# Patient Record
Sex: FEMALE | Race: BLACK OR AFRICAN AMERICAN | NOT HISPANIC OR LATINO | Employment: FULL TIME | URBAN - METROPOLITAN AREA
[De-identification: names, ages, dates, MRNs, and addresses within clinical notes are randomized per-mention and may not be internally consistent; named-entity substitution may affect disease eponyms.]

---

## 2017-11-13 ENCOUNTER — OFFICE VISIT (OUTPATIENT)
Dept: ONCOLOGY | Facility: CLINIC | Age: 57
End: 2017-11-13

## 2017-11-13 ENCOUNTER — APPOINTMENT (OUTPATIENT)
Dept: LAB | Facility: HOSPITAL | Age: 57
End: 2017-11-13

## 2017-11-13 ENCOUNTER — LAB (OUTPATIENT)
Dept: LAB | Facility: HOSPITAL | Age: 57
End: 2017-11-13

## 2017-11-13 ENCOUNTER — APPOINTMENT (OUTPATIENT)
Dept: ONCOLOGY | Facility: CLINIC | Age: 57
End: 2017-11-13

## 2017-11-13 VITALS
HEART RATE: 86 BPM | HEIGHT: 66 IN | BODY MASS INDEX: 40.08 KG/M2 | DIASTOLIC BLOOD PRESSURE: 82 MMHG | RESPIRATION RATE: 16 BRPM | WEIGHT: 249.4 LBS | TEMPERATURE: 97.9 F | SYSTOLIC BLOOD PRESSURE: 130 MMHG

## 2017-11-13 DIAGNOSIS — D70.9 NEUTROPENIA, UNSPECIFIED TYPE (HCC): ICD-10-CM

## 2017-11-13 DIAGNOSIS — D72.820 LYMPHOCYTOSIS: ICD-10-CM

## 2017-11-13 DIAGNOSIS — D72.820 LYMPHOCYTOSIS: Primary | ICD-10-CM

## 2017-11-13 LAB
BASOPHILS # BLD AUTO: 0 10*3/MM3 (ref 0–0.1)
BASOPHILS NFR BLD AUTO: 0 % (ref 0–1.1)
DEPRECATED RDW RBC AUTO: 38.5 FL (ref 37–49)
EOSINOPHIL # BLD AUTO: 0.01 10*3/MM3 (ref 0–0.36)
EOSINOPHIL NFR BLD AUTO: 0.2 % (ref 1–5)
ERYTHROCYTE [DISTWIDTH] IN BLOOD BY AUTOMATED COUNT: 12.7 % (ref 11.7–14.5)
HCT VFR BLD AUTO: 43.2 % (ref 34–45)
HGB BLD-MCNC: 14.7 G/DL (ref 11.5–14.9)
IMM GRANULOCYTES # BLD: 0.01 10*3/MM3 (ref 0–0.03)
IMM GRANULOCYTES NFR BLD: 0.2 % (ref 0–0.5)
LYMPHOCYTES # BLD AUTO: 2.13 10*3/MM3 (ref 1–3.5)
LYMPHOCYTES NFR BLD AUTO: 50.8 % (ref 20–49)
MCH RBC QN AUTO: 28.4 PG (ref 27–33)
MCHC RBC AUTO-ENTMCNC: 34 G/DL (ref 32–35)
MCV RBC AUTO: 83.4 FL (ref 83–97)
MONOCYTES # BLD AUTO: 0.43 10*3/MM3 (ref 0.25–0.8)
MONOCYTES NFR BLD AUTO: 10.3 % (ref 4–12)
NEUTROPHILS # BLD AUTO: 1.61 10*3/MM3 (ref 1.5–7)
NEUTROPHILS NFR BLD AUTO: 38.5 % (ref 39–75)
NRBC BLD MANUAL-RTO: 0 /100 WBC (ref 0–0)
PLATELET # BLD AUTO: 154 10*3/MM3 (ref 150–375)
PMV BLD AUTO: 14 FL (ref 8.9–12.1)
RBC # BLD AUTO: 5.18 10*6/MM3 (ref 3.9–5)
WBC NRBC COR # BLD: 4.19 10*3/MM3 (ref 4–10)

## 2017-11-13 PROCEDURE — 85025 COMPLETE CBC W/AUTO DIFF WBC: CPT | Performed by: INTERNAL MEDICINE

## 2017-11-13 PROCEDURE — 99213 OFFICE O/P EST LOW 20 MIN: CPT | Performed by: INTERNAL MEDICINE

## 2017-11-13 PROCEDURE — 36416 COLLJ CAPILLARY BLOOD SPEC: CPT | Performed by: INTERNAL MEDICINE

## 2017-11-13 RX ORDER — CLONIDINE HYDROCHLORIDE 0.3 MG/1
0.3 TABLET ORAL DAILY
COMMUNITY
Start: 2017-10-04 | End: 2021-08-23 | Stop reason: SDUPTHER

## 2017-11-13 NOTE — PROGRESS NOTES
Subjective . No SYMPTOMS    REASONS FOR FOLLOW UP:  LEUKOPENIA AND LYMPHOCYTOSIS.MARGINAL LOW B12 LEVEL.    HISTORY OF PRESENT ILLNESS:  The patient is a 57 y.o. year old female  who is here for follow-up with the above-mentioned history.   The patient is here worried today about what kind of hematological condition she will have.  the patient is asymptomatic.  Specifically, she has no fevers, no chills, no sweats, no pruritus or peripheral adenopathy; no rashes in the skin; no symptoms that would suggest any collagen vascular disease or symptoms that could suggest any [place]’s disease.  The patient’s appetite is stable.  Bowel function is normal.  Urination is normal.  No cardiorespiratory symptomatology. .  Otherwise, she remains fully functional, having no limitations in her activities of daily living.                    Past Medical History:   Diagnosis Date   • Benign heart murmur    • Hypertension    • Hypothyroidism    • Leukopenia    • Sickle cell anemia without crisis     SICKLE CELL TRAIT ONLY     Past Surgical History:   Procedure Laterality Date   • BILATERAL BREAST REDUCTION Bilateral 2013   • HYSTERECTOMY  1997           MEDICATIONS    Current Outpatient Prescriptions:   •  amLODIPine (NORVASC) 10 MG tablet, , Disp: , Rfl:   •  aspirin 81 MG EC tablet, Take 81 mg by mouth daily., Disp: , Rfl:   •  CloNIDine (CATAPRES) 0.3 MG tablet, , Disp: , Rfl:   •  Cyanocobalamin (B-12 PO), Take  by mouth., Disp: , Rfl:   •  diphenhydrAMINE (BENADRYL) 25 mg capsule, Take 25 mg by mouth daily., Disp: , Rfl:   •  fosinopril (MONOPRIL) 40 MG tablet, , Disp: , Rfl:   •  hydrochlorothiazide (HYDRODIURIL) 50 MG tablet, , Disp: , Rfl:   •  levothyroxine (SYNTHROID, LEVOTHROID) 150 MCG tablet, , Disp: , Rfl:   •  venlafaxine (EFFEXOR) 75 MG tablet, , Disp: , Rfl:     ALLERGIES:     Allergies   Allergen Reactions   • Penicillins        SOCIAL HISTORY:       Social History     Social History   • Marital status:      " Spouse name: N/A   • Number of children: 0   • Years of education: COLLEGE     Occupational History   •     • Service Rep      [employer]     Social History Main Topics   • Smoking status: Former Smoker     Packs/day: 0.30     Types: Cigarettes     Quit date: 4/13/2016   • Smokeless tobacco: Not on file      Comment: Quit 4/23/16   • Alcohol use No   • Drug use: No   • Sexual activity: Yes     Partners: Male     Other Topics Concern   • Not on file     Social History Narrative    Has 1 adopted child.         FAMILY HISTORY:  Family History   Problem Relation Age of Onset   • Hypertension Mother    • Diabetes Mother    • Breast cancer Mother 65   • COPD Mother    • Arthritis Mother    • Heart disease Mother    • Alcohol abuse Father    • Cirrhosis Father    • Hypertension Sister    • Fibromyalgia Sister    • Diabetes Sister    • Hypertension Brother    • Heart disease Brother    • Diabetes Brother    • Sarcoidosis Sister        REVIEW OF SYSTEMS:  GENERAL: No change in appetite or weight;   No fevers, chills, sweats.    SKIN: No nonhealing lesions.   No rashes.  HEME/LYMPH: No easy bruising, bleeding.   No swollen nodes.   EYES: No vision changes or diplopia.   ENT: No tinnitus, hearing loss, gum bleeding, epistaxis, hoarseness or dysphagia.   RESPIRATORY: No cough, shortness of breath, hemoptysis or wheezing.   CVS: No chest pain, palpitations, orthopnea, dyspnea on exertion or PND.   GI: No melena or hematochezia.   No abdominal pain.  No nausea, vomiting, constipation, diarrhea  : No lower tract obstructive symptoms, dysuria or hematuria.   MUSCULOSKELETAL: No bone pain.  No joint stiffness.   NEUROLOGICAL: No global weakness, loss of consciousness or seizures.   PSYCHIATRIC: No increased nervousness, mood changes or depression.     Objective    Vitals:    11/13/17 1539   BP: 130/82   Pulse: 86   Resp: 16   Temp: 97.9 °F (36.6 °C)   Weight: 249 lb 6.4 oz (113 kg)   Height: 65.75\" (167 cm)  Comment: " new ht.   PainSc: 0-No pain     Current Status 11/13/2017   ECOG score 0      PHYSICAL EXAM:    GENERAL:  Well-developed, well-nourished in no acute distress.   SKIN:  Warm, dry without rashes, purpura or petechiae.  HEAD:  Normocephalic.  EYES:  Pupils equal, round and reactive to light.  EOMs intact.  Conjunctivae normal.  EARS:  Hearing intact.  NOSE:  Septum midline.  No excoriations or nasal discharge.  MOUTH:  Tongue is well-papillated; no stomatitis or ulcers.  Lips normal.  THROAT:  Oropharynx without lesions or exudates.  NECK:  Supple with good range of motion; no thyromegaly or masses, no JVD.  LYMPHATICS:  No cervical, supraclavicular, axillary or inguinal adenopathy.  CHEST:  Lungs clear to percussion and auscultation. Good airflow.  CARDIAC:  Regular rate and rhythm with SYSTOLIC G2/6 BASAL murmur,clean diastole, no rubs or gallops. Normal S1,S2.  ABDOMEN:  Soft, nontender with no organomegaly or masses.  EXTREMITIES:  No clubbing, cyanosis or edema.  NEUROLOGICAL:  Cranial Nerves II-XII grossly intact.  No focal neurological deficits.  PSYCHIATRIC:  Normal affect and mood.      RECENT LABS:  Component      Latest Ref Rng & Units 7/11/2016 11/14/2016 11/13/2017           1:16 PM 11:31 AM  3:29 PM   RBC      3.90 - 5.00 10*6/mm3 5.15 (H) 5.03 (H) 5.18 (H)   Hemoglobin      11.5 - 14.9 g/dL 14.3 13.7 14.7   Hematocrit      34.0 - 45.0 % 42.0 43.2 43.2   MCV      83.0 - 97.0 fL 81.6 (L) 85.9 83.4   MCH      27.0 - 33.0 pg 27.8 27.2 28.4   MCHC      32.0 - 35.0 g/dL 34.0 31.7 (L) 34.0   RDW      11.7 - 14.5 % 12.2 12.5 12.7   RDW-SD      37.0 - 49.0 fl 36.0 (L) 39.0 38.5   MPV      8.9 - 12.1 fL 13.6 (H) 13.5 (H) 14.0 (H)   Platelets      150 - 375 10*3/mm3 157 160 154   Neutrophil %      39.0 - 75.0 % 36.9 (L) 35.6 (L) 38.5 (L)   Lymphocyte %      20.0 - 49.0 % 52.5 (H) 54.3 (H) 50.8 (H)   Monocyte %      4.0 - 12.0 % 10.0 9.7 10.3   Eosinophil %      1.0 - 5.0 % 0.2 (L) 0.2 (L) 0.2 (L)   Basophil %       0.0 - 1.1 % 0.2 0.0 0.0           WBC   Date/Time Value Ref Range Status   11/13/2017 03:29 PM 4.19 4.00 - 10.00 10*3/mm3 Final     Hemoglobin   Date/Time Value Ref Range Status   11/13/2017 03:29 PM 14.7 11.5 - 14.9 g/dL Final     Platelets   Date/Time Value Ref Range Status   11/13/2017 03:29  150 - 375 10*3/mm3 Final       Assessment/Plan    This patient has minimal leukopenia with lymphocytosis, otherwise asymptomatic, and is very likely that this is representation of leukopenia of .  We have tested this patient during the original consultation for hemoglobinopathy, having a normal hemoglobin electrophoresis.  Also the patient had a normal ferritin, normal iron profile, normal folic acid level and a marginally low B12 level.  The patient had serum protein electrophoresis and immunofixation that were negative for monoclonal proteins.  Also the patient had peripheral blood flow cytometry that failed to document any alteration of monoclonality in regard to her lymphocyte population.  Given these facts, we advised the patient with a normal physical examination TO RETURN TO SEE US IN ONE YEAR WITH CBC.

## 2018-09-12 ENCOUNTER — OFFICE VISIT CONVERTED (OUTPATIENT)
Dept: FAMILY MEDICINE CLINIC | Facility: CLINIC | Age: 58
End: 2018-09-12
Attending: NURSE PRACTITIONER

## 2018-11-13 ENCOUNTER — CONVERSION ENCOUNTER (OUTPATIENT)
Dept: GENERAL RADIOLOGY | Facility: HOSPITAL | Age: 58
End: 2018-11-13

## 2018-12-19 ENCOUNTER — APPOINTMENT (OUTPATIENT)
Dept: LAB | Facility: HOSPITAL | Age: 58
End: 2018-12-19

## 2018-12-19 ENCOUNTER — OFFICE VISIT (OUTPATIENT)
Dept: ONCOLOGY | Facility: CLINIC | Age: 58
End: 2018-12-19

## 2018-12-19 VITALS
DIASTOLIC BLOOD PRESSURE: 90 MMHG | SYSTOLIC BLOOD PRESSURE: 144 MMHG | BODY MASS INDEX: 34.6 KG/M2 | TEMPERATURE: 98.7 F | HEART RATE: 82 BPM | RESPIRATION RATE: 12 BRPM | OXYGEN SATURATION: 99 % | HEIGHT: 65 IN | WEIGHT: 207.7 LBS

## 2018-12-19 DIAGNOSIS — D69.6 THROMBOCYTOPENIA (HCC): Primary | ICD-10-CM

## 2018-12-19 DIAGNOSIS — D72.820 LYMPHOCYTOSIS: Primary | ICD-10-CM

## 2018-12-19 LAB
BASOPHILS # BLD AUTO: 0.01 10*3/MM3 (ref 0–0.1)
BASOPHILS NFR BLD AUTO: 0.2 % (ref 0–1.1)
DEPRECATED RDW RBC AUTO: 38.2 FL (ref 37–49)
EOSINOPHIL # BLD AUTO: 0.01 10*3/MM3 (ref 0–0.36)
EOSINOPHIL NFR BLD AUTO: 0.2 % (ref 1–5)
ERYTHROCYTE [DISTWIDTH] IN BLOOD BY AUTOMATED COUNT: 12.7 % (ref 11.7–14.5)
HCT VFR BLD AUTO: 42.2 % (ref 34–45)
HGB BLD-MCNC: 14.1 G/DL (ref 11.5–14.9)
IMM GRANULOCYTES # BLD: 0.02 10*3/MM3 (ref 0–0.03)
IMM GRANULOCYTES NFR BLD: 0.4 % (ref 0–0.5)
LYMPHOCYTES # BLD AUTO: 2.62 10*3/MM3 (ref 1–3.5)
LYMPHOCYTES NFR BLD AUTO: 57.6 % (ref 20–49)
MCH RBC QN AUTO: 27.8 PG (ref 27–33)
MCHC RBC AUTO-ENTMCNC: 33.4 G/DL (ref 32–35)
MCV RBC AUTO: 83.2 FL (ref 83–97)
MONOCYTES # BLD AUTO: 0.37 10*3/MM3 (ref 0.25–0.8)
MONOCYTES NFR BLD AUTO: 8.1 % (ref 4–12)
NEUTROPHILS # BLD AUTO: 1.52 10*3/MM3 (ref 1.5–7)
NEUTROPHILS NFR BLD AUTO: 33.5 % (ref 39–75)
NRBC BLD MANUAL-RTO: 0 /100 WBC (ref 0–0)
PLATELET # BLD AUTO: 127 10*3/MM3 (ref 150–375)
PMV BLD AUTO: 13.1 FL (ref 8.9–12.1)
RBC # BLD AUTO: 5.07 10*6/MM3 (ref 3.9–5)
WBC NRBC COR # BLD: 4.55 10*3/MM3 (ref 4–10)

## 2018-12-19 PROCEDURE — 36415 COLL VENOUS BLD VENIPUNCTURE: CPT | Performed by: INTERNAL MEDICINE

## 2018-12-19 PROCEDURE — 99213 OFFICE O/P EST LOW 20 MIN: CPT | Performed by: INTERNAL MEDICINE

## 2018-12-19 PROCEDURE — 85025 COMPLETE CBC W/AUTO DIFF WBC: CPT | Performed by: INTERNAL MEDICINE

## 2018-12-19 RX ORDER — TOPIRAMATE 50 MG/1
1 CAPSULE, EXTENDED RELEASE ORAL
Refills: 0 | COMMUNITY
Start: 2018-11-17 | End: 2020-01-16 | Stop reason: SDDI

## 2018-12-19 RX ORDER — PHENTERMINE HYDROCHLORIDE 37.5 MG/1
37.5 TABLET ORAL EVERY OTHER DAY
Refills: 0 | COMMUNITY
Start: 2018-11-17 | End: 2022-11-02 | Stop reason: SDUPTHER

## 2018-12-19 NOTE — PROGRESS NOTES
Subjective . No SYMPTOMS    REASONS FOR FOLLOW UP:  LEUKOPENIA AND LYMPHOCYTOSIS.MARGINAL LOW B12 LEVEL.mild thrombocytopenia    HISTORY OF PRESENT ILLNESS:  This patients returns today to the office for followup stating that she feels terrific. She has been walking close to 2 miles a day and she has been able to get rid of 50 pounds of weight. She modified her diet as well. This is a terrific achievement. She has had good bowel activity, every other day, normal for her. Normal urination. No clinical bleeding and no infections of any nature. She has been told she has an element of osteopenia.                 Past Medical History:   Diagnosis Date   • Benign heart murmur    • Hypertension    • Hypothyroidism    • Leukopenia    • Osteopenia    • Sickle cell anemia without crisis (CMS/HCC)     SICKLE CELL TRAIT ONLY     Past Surgical History:   Procedure Laterality Date   • BILATERAL BREAST REDUCTION Bilateral 2013   • HYSTERECTOMY  1997           MEDICATIONS    Current Outpatient Medications:   •  amLODIPine (NORVASC) 10 MG tablet, , Disp: , Rfl:   •  aspirin 81 MG EC tablet, Take 81 mg by mouth daily., Disp: , Rfl:   •  CALCIUM PO, Take  by mouth., Disp: , Rfl:   •  CloNIDine (CATAPRES) 0.3 MG tablet, , Disp: , Rfl:   •  Cyanocobalamin (B-12 PO), Take  by mouth., Disp: , Rfl:   •  diphenhydrAMINE (BENADRYL) 25 mg capsule, Take 25 mg by mouth daily., Disp: , Rfl:   •  fosinopril (MONOPRIL) 40 MG tablet, , Disp: , Rfl:   •  hydrochlorothiazide (HYDRODIURIL) 50 MG tablet, , Disp: , Rfl:   •  levothyroxine (SYNTHROID, LEVOTHROID) 150 MCG tablet, , Disp: , Rfl:   •  Multiple Vitamin (MULTI VITAMIN DAILY PO), Take  by mouth., Disp: , Rfl:   •  phentermine (ADIPEX-P) 37.5 MG tablet, Take 37.5 mg by mouth Every Morning., Disp: , Rfl: 0  •  TROKENDI XR 50 MG capsule sustained-release 24 hr, Take 1 capsule by mouth every night at bedtime., Disp: , Rfl: 0  •  venlafaxine (EFFEXOR) 75 MG tablet, , Disp: , Rfl:   •  CLONIDINE  HCL PO, , Disp: , Rfl:     ALLERGIES:     Allergies   Allergen Reactions   • Penicillins        SOCIAL HISTORY:       Social History     Socioeconomic History   • Marital status:      Spouse name: Not on file   • Number of children: 0   • Years of education: COLLEGE   • Highest education level: Not on file   Social Needs   • Financial resource strain: Not on file   • Food insecurity - worry: Not on file   • Food insecurity - inability: Not on file   • Transportation needs - medical: Not on file   • Transportation needs - non-medical: Not on file   Occupational History   • Occupation:    • Occupation: Service Rep     Comment: [employer]   Tobacco Use   • Smoking status: Former Smoker     Packs/day: 0.30     Types: Cigarettes     Last attempt to quit: 2016     Years since quittin.6   • Tobacco comment: Quit 16   Substance and Sexual Activity   • Alcohol use: No   • Drug use: No   • Sexual activity: Yes     Partners: Male   Other Topics Concern   • Not on file   Social History Narrative    Has 1 adopted child.         FAMILY HISTORY:  Family History   Problem Relation Age of Onset   • Hypertension Mother    • Diabetes Mother    • Breast cancer Mother 65   • COPD Mother    • Arthritis Mother    • Heart disease Mother    • Alcohol abuse Father    • Cirrhosis Father    • Hypertension Sister    • Fibromyalgia Sister    • Diabetes Sister    • Hypertension Brother    • Heart disease Brother    • Diabetes Brother    • Sarcoidosis Sister        REVIEW OF SYSTEMS:    General: no fever, no chills, no fatigue,no weight changes, no lack of appetite.  Eyes: no epiphora, xerophthalmia,conjunctivitis, pain, glaucoma, blurred vision, blindness, secretion, photophobia, proptosis, diplopia.  Ears: no otorrhea, tinnitus, otorrhagia, deafness, pain, vertigo.  Nose: no rhinorrhea, no epistaxis, no alteration in perception of odors, no sinuses pressure.  Mouth: no alteration in gums or teeth,  No ulcers, no  "difficulty with mastication or deglut ion, no odynophagia.  Neck: no masses or pain, no thyroid alterations, no pain in muscles or arteries, no carotid odynia, no crepitation.  Respiratory: no cough, no sputum production,no dyspnea,no trepopnea, no pleuritic pain,no hemoptysis.  Heart: no syncope, no irregularity, no palpitations, no angina,no orthopnea,no paroxysmal nocturnal dyspnea.  Vascular Venous: no tenderness,no edema,no palpable cords,no postphlebitic syndrome, no skin changes no ulcerations.  Vascular Arterial: no distal ischemia, noclaudication, no gangrene, no neuropathic ischemic pain, no skin ulcers, no paleness no cyanosis.  GI: no dysphagia, no odynophagia, no regurgitation, no heartburn,no indigestion,no nausea,no vomiting,no hematemesis ,no melena,no jaundice,no distention, no obstipation,no enterorrhagia,no proctalgia,no anal  lesions, no changes in bowel habits.  : no frequency, no hesitancy, no hematuria, no discharge,no  pain.  Musculoskeletal: no muscle or tendon pain or inflammation,no  joint pain, no edema, no functional limitation,no fasciculations, no mass.  Neurologic: no headache, no seizures, noalterations on Craneal nerves, no motor deficit, no sensory deficit, normal coordination, no alteration in memory,normal orientation, calculation,normal writting, verbal and written language.  Skin: no rashes,no pruritus no localized lesions.  Psychiatric: no anxiety, no depression,no agitation, no delusions, proper insight.      Objective    Vitals:    12/19/18 1629   BP: 144/90   Pulse: 82   Resp: 12   Temp: 98.7 °F (37.1 °C)   TempSrc: Oral   SpO2: 99%   Weight: 94.2 kg (207 lb 11.2 oz)   Height: 165 cm (64.96\")   PainSc: 0-No pain     Current Status 12/19/2018   ECOG score 0      PHYSICAL EXAM:    GENERAL:  Well-developed, well-nourished  Patient  in no acute distress.   SKIN:  Warm, dry ,NO rashes,NO purpura ,NO petechiae.  HEENT:  Pupils were equal and reactive to light and accomodation, " conjunctivas non injected, no pterigion, normal extraocular movements, normal visual acuity.   Mouth mucosa was moist, no exudates in oropharynx, normal gum line, normal roof of the mouth and pillars, normal papillations of the tongue.  NECK:  Supple with good range of motion; no thyromegaly or masses, no JVD or bruits, no cervical adenopathies.No carotid arteries pain, no carotid abnormal pulsation , NO arterial dance.  LYMPHATICS:  No cervical, NO supraclavicular, NO axillary,NO epitrochlear , NO inguinal adenopathy.  CHEST:  Normal excursion of both lina thoraces, normal voice fremitus, no subcutaneous emphysema, normal axillas, no rashes or acanthosis nigricans. Lungs clear to percussion and auscultation, normal breath sounds bilaterally, no wheezing,NO crackles NO ronchi, NO stridor, NO rubs.  CARDIAC AND VASCULAR:  normal rate and regular rhythm, without murmurs,NO rubs NO S3 NO S4 right or left . Normal femoral, popliteal, pedis, brachial and carotid pulses.  ABDOMEN:  Soft, nontender with no organomegaly or masses, no ascites, no collateral circulation,no distention,no [place] sign, no abdominal pain, no inguinal hernias,no umbilical hernia, no abdominal bruits. Normal bowel sounds.  GENITAL: Not  Performed.  EXTREMITIES  AND SPINE:  No clubbing, cyanosis or edema, no deformities or pain .No kyphosis, scoliosis, deformities or pain in spine, ribs or pelvic bone.  NEUROLOGICAL:  Patient was awake, alert, oriented to time, person and place.          RECENT LABS:      WBC   Date/Time Value Ref Range Status   12/19/2018 04:15 PM 4.55 4.00 - 10.00 10*3/mm3 Final     Hemoglobin   Date/Time Value Ref Range Status   12/19/2018 04:15 PM 14.1 11.5 - 14.9 g/dL Final     Platelets   Date/Time Value Ref Range Status   12/19/2018 04:15  (L) 150 - 375 10*3/mm3 Final       Assessment/Plan    This patient has minimal leukopenia with lymphocytosis, otherwise asymptomatic, and is very likely that this is representation of  leukopenia of .  We have tested this patient during the original consultation for hemoglobinopathy, having a normal hemoglobin electrophoresis.  Also the patient had a normal ferritin, normal iron profile, normal folic acid level and a marginally low B12 level.  The patient had serum protein electrophoresis and immunofixation that were negative for monoclonal proteins.  Also the patient had peripheral blood flow cytometry that failed to document any alteration of monoclonality in regard to her lymphocyte population.  Given these facts, we advised the patient with a normal physical examination TO RETURN TO SEE US IN ONE YEAR WITH CBC. Today the patient has minimal thrombocytopenia that probably has no implications. The patient’s physical exam is otherwise unremarkable. Therefore, under the present premises, I advised the patient to come back and see me in a year with a CBC.     I encouraged her to continue on working on a weight control. This is a major achievement in regard to losing 50 pounds in a 1-year period. I think I asked her to continue walking. In the long run this will be dramatically beneficial in regard to osteopenia. I asked her to take vitamin D supplementation as well.

## 2018-12-21 ENCOUNTER — OFFICE VISIT CONVERTED (OUTPATIENT)
Dept: FAMILY MEDICINE CLINIC | Facility: CLINIC | Age: 58
End: 2018-12-21
Attending: NURSE PRACTITIONER

## 2019-02-04 ENCOUNTER — HOSPITAL ENCOUNTER (OUTPATIENT)
Dept: LAB | Facility: HOSPITAL | Age: 59
Discharge: HOME | End: 2019-02-04
Attending: NURSE PRACTITIONER

## 2019-02-04 LAB
T4 FREE SERPL-MCNC: 1.7 NG/DL (ref 0.9–1.8)
TSH SERPL-ACNC: 0.46 M[IU]/L (ref 0.27–4.2)

## 2019-06-21 ENCOUNTER — OFFICE VISIT CONVERTED (OUTPATIENT)
Dept: FAMILY MEDICINE CLINIC | Facility: CLINIC | Age: 59
End: 2019-06-21
Attending: NURSE PRACTITIONER

## 2019-06-29 ENCOUNTER — HOSPITAL ENCOUNTER (OUTPATIENT)
Dept: OTHER | Facility: HOSPITAL | Age: 59
Discharge: HOME | End: 2019-06-29
Attending: NURSE PRACTITIONER

## 2019-06-29 LAB
ALBUMIN SERPL-MCNC: 4.2 G/DL (ref 3.5–5)
ALBUMIN/GLOB SERPL: 1.6 {RATIO} (ref 1.4–2.6)
ALP SERPL-CCNC: 60 U/L (ref 53–141)
ALT SERPL-CCNC: 15 U/L (ref 10–40)
ANION GAP SERPL CALC-SCNC: 15 MMOL/L (ref 8–19)
APPEARANCE UR: ABNORMAL
AST SERPL-CCNC: 14 U/L (ref 15–50)
BILIRUB SERPL-MCNC: 0.27 MG/DL (ref 0.2–1.3)
BILIRUB UR QL: NEGATIVE
BUN SERPL-MCNC: 9 MG/DL (ref 5–25)
BUN/CREAT SERPL: 10 {RATIO} (ref 6–20)
CALCIUM SERPL-MCNC: 9.4 MG/DL (ref 8.7–10.4)
CHLORIDE SERPL-SCNC: 102 MMOL/L (ref 99–111)
CHOLEST SERPL-MCNC: 181 MG/DL (ref 107–200)
CHOLEST/HDLC SERPL: 3.6 {RATIO} (ref 3–6)
COLOR UR: ABNORMAL
CONV BACTERIA: ABNORMAL
CONV CO2: 29 MMOL/L (ref 22–32)
CONV COLLECTION SOURCE (UA): ABNORMAL
CONV HYALINE CASTS IN URINE MICRO: ABNORMAL /[LPF]
CONV TOTAL PROTEIN: 6.8 G/DL (ref 6.3–8.2)
CONV UROBILINOGEN IN URINE BY AUTOMATED TEST STRIP: 1 {EHRLICHU}/DL (ref 0.1–1)
CREAT UR-MCNC: 0.93 MG/DL (ref 0.5–0.9)
GFR SERPLBLD BASED ON 1.73 SQ M-ARVRAT: >60 ML/MIN/{1.73_M2}
GLOBULIN UR ELPH-MCNC: 2.6 G/DL (ref 2–3.5)
GLUCOSE SERPL-MCNC: 93 MG/DL (ref 65–99)
GLUCOSE UR QL: NEGATIVE MG/DL
HDLC SERPL-MCNC: 50 MG/DL (ref 40–60)
HGB UR QL STRIP: NEGATIVE
KETONES UR QL STRIP: NEGATIVE MG/DL
LDLC SERPL CALC-MCNC: 116 MG/DL (ref 70–100)
LEUKOCYTE ESTERASE UR QL STRIP: ABNORMAL
NITRITE UR QL STRIP: NEGATIVE
OSMOLALITY SERPL CALC.SUM OF ELEC: 292 MOSM/KG (ref 273–304)
PH UR STRIP.AUTO: 6.5 [PH] (ref 5–8)
POTASSIUM SERPL-SCNC: 3.5 MMOL/L (ref 3.5–5.3)
PROT UR QL: NEGATIVE MG/DL
RBC #/AREA URNS HPF: ABNORMAL /[HPF]
SODIUM SERPL-SCNC: 142 MMOL/L (ref 135–147)
SP GR UR: 1.02 (ref 1–1.03)
SQUAMOUS SPT QL MICRO: ABNORMAL /[HPF]
T4 FREE SERPL-MCNC: 1.2 NG/DL (ref 0.9–1.8)
TRIGL SERPL-MCNC: 74 MG/DL (ref 40–150)
TSH SERPL-ACNC: 2.52 M[IU]/L (ref 0.27–4.2)
VLDLC SERPL-MCNC: 15 MG/DL (ref 5–37)
WBC #/AREA URNS HPF: ABNORMAL /[HPF]

## 2019-07-19 ENCOUNTER — HOSPITAL ENCOUNTER (OUTPATIENT)
Dept: LAB | Facility: HOSPITAL | Age: 59
Discharge: HOME | End: 2019-07-19
Attending: NURSE PRACTITIONER

## 2019-07-19 LAB
APPEARANCE UR: CLEAR
BILIRUB UR QL: NEGATIVE
COLOR UR: ABNORMAL
CONV BACTERIA: NEGATIVE
CONV COLLECTION SOURCE (UA): ABNORMAL
CONV HYALINE CASTS IN URINE MICRO: ABNORMAL /[LPF]
CONV UROBILINOGEN IN URINE BY AUTOMATED TEST STRIP: 1 {EHRLICHU}/DL (ref 0.1–1)
GLUCOSE UR QL: NEGATIVE MG/DL
HGB UR QL STRIP: NEGATIVE
KETONES UR QL STRIP: NEGATIVE MG/DL
LEUKOCYTE ESTERASE UR QL STRIP: ABNORMAL
NITRITE UR QL STRIP: NEGATIVE
PH UR STRIP.AUTO: 8 [PH] (ref 5–8)
PROT UR QL: ABNORMAL MG/DL
RBC #/AREA URNS HPF: ABNORMAL /[HPF]
SP GR UR: 1.03 (ref 1–1.03)
SQUAMOUS SPT QL MICRO: ABNORMAL /[HPF]
WBC #/AREA URNS HPF: ABNORMAL /[HPF]

## 2019-08-02 ENCOUNTER — HOSPITAL ENCOUNTER (OUTPATIENT)
Dept: LAB | Facility: HOSPITAL | Age: 59
Discharge: HOME | End: 2019-08-02
Attending: NURSE PRACTITIONER

## 2019-08-04 LAB — BACTERIA UR CULT: NORMAL

## 2019-09-10 ENCOUNTER — OFFICE VISIT CONVERTED (OUTPATIENT)
Dept: FAMILY MEDICINE CLINIC | Facility: CLINIC | Age: 59
End: 2019-09-10
Attending: NURSE PRACTITIONER

## 2019-09-30 ENCOUNTER — HOSPITAL ENCOUNTER (OUTPATIENT)
Dept: PHYSICAL THERAPY | Facility: CLINIC | Age: 59
Setting detail: RECURRING SERIES
Discharge: HOME | End: 2019-11-19
Attending: NURSE PRACTITIONER

## 2019-10-28 ENCOUNTER — OFFICE VISIT CONVERTED (OUTPATIENT)
Dept: FAMILY MEDICINE CLINIC | Facility: CLINIC | Age: 59
End: 2019-10-28
Attending: NURSE PRACTITIONER

## 2019-10-28 ENCOUNTER — CONVERSION ENCOUNTER (OUTPATIENT)
Dept: FAMILY MEDICINE CLINIC | Facility: CLINIC | Age: 59
End: 2019-10-28

## 2019-11-15 ENCOUNTER — HOSPITAL ENCOUNTER (OUTPATIENT)
Dept: GENERAL RADIOLOGY | Facility: HOSPITAL | Age: 59
Discharge: HOME | End: 2019-11-15
Attending: NURSE PRACTITIONER

## 2020-01-03 ENCOUNTER — OFFICE VISIT CONVERTED (OUTPATIENT)
Dept: FAMILY MEDICINE CLINIC | Facility: CLINIC | Age: 60
End: 2020-01-03
Attending: NURSE PRACTITIONER

## 2020-01-08 ENCOUNTER — HOSPITAL ENCOUNTER (OUTPATIENT)
Dept: LAB | Facility: HOSPITAL | Age: 60
Discharge: HOME | End: 2020-01-08
Attending: NURSE PRACTITIONER

## 2020-01-08 LAB
ALBUMIN SERPL-MCNC: 4.4 G/DL (ref 3.5–5)
ALBUMIN/GLOB SERPL: 1.8 {RATIO} (ref 1.4–2.6)
ALP SERPL-CCNC: 63 U/L (ref 53–141)
ALT SERPL-CCNC: 13 U/L (ref 10–40)
ANION GAP SERPL CALC-SCNC: 14 MMOL/L (ref 8–19)
APPEARANCE UR: ABNORMAL
AST SERPL-CCNC: 15 U/L (ref 15–50)
BASOPHILS # BLD AUTO: 0.01 10*3/UL (ref 0–0.2)
BASOPHILS NFR BLD AUTO: 0.3 % (ref 0–3)
BILIRUB SERPL-MCNC: 0.44 MG/DL (ref 0.2–1.3)
BILIRUB UR QL: ABNORMAL
BUN SERPL-MCNC: 11 MG/DL (ref 5–25)
BUN/CREAT SERPL: 13 {RATIO} (ref 6–20)
CALCIUM SERPL-MCNC: 9.4 MG/DL (ref 8.7–10.4)
CHLORIDE SERPL-SCNC: 102 MMOL/L (ref 99–111)
CHOLEST SERPL-MCNC: 180 MG/DL (ref 107–200)
CHOLEST/HDLC SERPL: 4.3 {RATIO} (ref 3–6)
COLOR UR: ABNORMAL
CONV ABS IMM GRAN: 0.01 10*3/UL (ref 0–0.2)
CONV BACTERIA: NEGATIVE
CONV CO2: 28 MMOL/L (ref 22–32)
CONV COLLECTION SOURCE (UA): ABNORMAL
CONV HYALINE CASTS IN URINE MICRO: ABNORMAL /[LPF]
CONV IMMATURE GRAN: 0.3 % (ref 0–1.8)
CONV TOTAL PROTEIN: 6.8 G/DL (ref 6.3–8.2)
CONV UROBILINOGEN IN URINE BY AUTOMATED TEST STRIP: 1 {EHRLICHU}/DL (ref 0.1–1)
CREAT UR-MCNC: 0.83 MG/DL (ref 0.5–0.9)
DEPRECATED RDW RBC AUTO: 39.9 FL (ref 36.4–46.3)
EOSINOPHIL # BLD AUTO: 0.01 10*3/UL (ref 0–0.7)
EOSINOPHIL # BLD AUTO: 0.3 % (ref 0–7)
ERYTHROCYTE [DISTWIDTH] IN BLOOD BY AUTOMATED COUNT: 12.9 % (ref 11.7–14.4)
GFR SERPLBLD BASED ON 1.73 SQ M-ARVRAT: >60 ML/MIN/{1.73_M2}
GLOBULIN UR ELPH-MCNC: 2.4 G/DL (ref 2–3.5)
GLUCOSE SERPL-MCNC: 88 MG/DL (ref 65–99)
GLUCOSE UR QL: NEGATIVE MG/DL
HCT VFR BLD AUTO: 40.6 % (ref 37–47)
HDLC SERPL-MCNC: 42 MG/DL (ref 40–60)
HGB BLD-MCNC: 13 G/DL (ref 12–16)
HGB UR QL STRIP: NEGATIVE
KETONES UR QL STRIP: NEGATIVE MG/DL
LDLC SERPL CALC-MCNC: 121 MG/DL (ref 70–100)
LEUKOCYTE ESTERASE UR QL STRIP: ABNORMAL
LYMPHOCYTES # BLD AUTO: 1.51 10*3/UL (ref 1–5)
LYMPHOCYTES NFR BLD AUTO: 43.4 % (ref 20–45)
MCH RBC QN AUTO: 27.4 PG (ref 27–31)
MCHC RBC AUTO-ENTMCNC: 32 G/DL (ref 33–37)
MCV RBC AUTO: 85.5 FL (ref 81–99)
MONOCYTES # BLD AUTO: 0.32 10*3/UL (ref 0.2–1.2)
MONOCYTES NFR BLD AUTO: 9.2 % (ref 3–10)
NEUTROPHILS # BLD AUTO: 1.62 10*3/UL (ref 2–8)
NEUTROPHILS NFR BLD AUTO: 46.5 % (ref 30–85)
NITRITE UR QL STRIP: NEGATIVE
NRBC CBCN: 0 % (ref 0–0.7)
OSMOLALITY SERPL CALC.SUM OF ELEC: 291 MOSM/KG (ref 273–304)
PH UR STRIP.AUTO: 5.5 [PH] (ref 5–8)
PLATELET # BLD AUTO: 166 10*3/UL (ref 130–400)
PMV BLD AUTO: 14 FL (ref 9.4–12.3)
POTASSIUM SERPL-SCNC: 3.4 MMOL/L (ref 3.5–5.3)
PROT UR QL: NEGATIVE MG/DL
RBC # BLD AUTO: 4.75 10*6/UL (ref 4.2–5.4)
RBC #/AREA URNS HPF: ABNORMAL /[HPF]
SODIUM SERPL-SCNC: 141 MMOL/L (ref 135–147)
SP GR UR: 1.03 (ref 1–1.03)
SQUAMOUS SPT QL MICRO: ABNORMAL /[HPF]
T4 FREE SERPL-MCNC: 1.3 NG/DL (ref 0.9–1.8)
TRIGL SERPL-MCNC: 84 MG/DL (ref 40–150)
TSH SERPL-ACNC: 1.53 M[IU]/L (ref 0.27–4.2)
VLDLC SERPL-MCNC: 17 MG/DL (ref 5–37)
WBC # BLD AUTO: 3.48 10*3/UL (ref 4.8–10.8)
WBC #/AREA URNS HPF: ABNORMAL /[HPF]

## 2020-01-16 ENCOUNTER — OFFICE VISIT (OUTPATIENT)
Dept: ONCOLOGY | Facility: CLINIC | Age: 60
End: 2020-01-16

## 2020-01-16 ENCOUNTER — LAB (OUTPATIENT)
Dept: LAB | Facility: HOSPITAL | Age: 60
End: 2020-01-16

## 2020-01-16 VITALS
HEIGHT: 66 IN | OXYGEN SATURATION: 96 % | WEIGHT: 223.2 LBS | TEMPERATURE: 98.3 F | RESPIRATION RATE: 16 BRPM | DIASTOLIC BLOOD PRESSURE: 85 MMHG | SYSTOLIC BLOOD PRESSURE: 144 MMHG | BODY MASS INDEX: 35.87 KG/M2 | HEART RATE: 85 BPM

## 2020-01-16 DIAGNOSIS — D72.820 LYMPHOCYTOSIS: Primary | ICD-10-CM

## 2020-01-16 DIAGNOSIS — D69.6 THROMBOCYTOPENIA, ACQUIRED (HCC): ICD-10-CM

## 2020-01-16 DIAGNOSIS — D72.820 LYMPHOCYTOSIS: ICD-10-CM

## 2020-01-16 DIAGNOSIS — D70.8 OTHER NEUTROPENIA (HCC): Primary | ICD-10-CM

## 2020-01-16 LAB
BASOPHILS # BLD AUTO: 0.02 10*3/MM3 (ref 0–0.2)
BASOPHILS NFR BLD AUTO: 0.5 % (ref 0–1.5)
DEPRECATED RDW RBC AUTO: 39.4 FL (ref 37–54)
EOSINOPHIL # BLD AUTO: 0.02 10*3/MM3 (ref 0–0.4)
EOSINOPHIL NFR BLD AUTO: 0.5 % (ref 0.3–6.2)
ERYTHROCYTE [DISTWIDTH] IN BLOOD BY AUTOMATED COUNT: 12.9 % (ref 12.3–15.4)
HCT VFR BLD AUTO: 41.7 % (ref 34–46.6)
HGB BLD-MCNC: 13.8 G/DL (ref 12–15.9)
IMM GRANULOCYTES # BLD AUTO: 0.01 10*3/MM3 (ref 0–0.05)
IMM GRANULOCYTES NFR BLD AUTO: 0.2 % (ref 0–0.5)
LYMPHOCYTES # BLD AUTO: 2.09 10*3/MM3 (ref 0.7–3.1)
LYMPHOCYTES NFR BLD AUTO: 48.3 % (ref 19.6–45.3)
MCH RBC QN AUTO: 28 PG (ref 26.6–33)
MCHC RBC AUTO-ENTMCNC: 33.1 G/DL (ref 31.5–35.7)
MCV RBC AUTO: 84.6 FL (ref 79–97)
MONOCYTES # BLD AUTO: 0.41 10*3/MM3 (ref 0.1–0.9)
MONOCYTES NFR BLD AUTO: 9.5 % (ref 5–12)
NEUTROPHILS # BLD AUTO: 1.78 10*3/MM3 (ref 1.7–7)
NEUTROPHILS NFR BLD AUTO: 41 % (ref 42.7–76)
NRBC BLD AUTO-RTO: 0 /100 WBC (ref 0–0.2)
PLATELET # BLD AUTO: 111 10*3/MM3 (ref 140–450)
PMV BLD AUTO: 13.3 FL (ref 6–12)
RBC # BLD AUTO: 4.93 10*6/MM3 (ref 3.77–5.28)
WBC NRBC COR # BLD: 4.33 10*3/MM3 (ref 3.4–10.8)

## 2020-01-16 PROCEDURE — 36415 COLL VENOUS BLD VENIPUNCTURE: CPT

## 2020-01-16 PROCEDURE — 85025 COMPLETE CBC W/AUTO DIFF WBC: CPT

## 2020-01-16 PROCEDURE — 99213 OFFICE O/P EST LOW 20 MIN: CPT | Performed by: INTERNAL MEDICINE

## 2020-01-16 RX ORDER — BUSPIRONE HYDROCHLORIDE 10 MG/1
TABLET ORAL
COMMUNITY
Start: 2019-12-09 | End: 2021-06-30

## 2020-01-16 NOTE — PROGRESS NOTES
Subjective . No SYMPTOMS    REASONS FOR FOLLOW UP:  LEUKOPENIA AND LYMPHOCYTOSIS.MARGINAL LOW B12 LEVEL.mild thrombocytopenia    HISTORY OF PRESENT ILLNESS: This patient returns today to the office. She is here today with no symptoms of anything besides being worried about what we have to say about her blood count. She has not slept in 2 days worrying about this visit. Besides this she is eating well. Her weight is stable. Her bowel function and urination are normal. No cardiovascular or respiratory issues. Her blood pressure is under good control. She has not changed her diet at this time. She has not had any other problems.                     Past Medical History:   Diagnosis Date   • Benign heart murmur    • Hypertension    • Hypothyroidism    • Leukopenia    • Osteopenia    • Sickle cell anemia without crisis (CMS/HCC)     SICKLE CELL TRAIT ONLY     Past Surgical History:   Procedure Laterality Date   • BILATERAL BREAST REDUCTION Bilateral 2013   • HYSTERECTOMY  1997           MEDICATIONS    Current Outpatient Medications:   •  amLODIPine (NORVASC) 10 MG tablet, , Disp: , Rfl:   •  aspirin 81 MG EC tablet, Take 81 mg by mouth daily., Disp: , Rfl:   •  buPROPion HCl (WELLBUTRIN PO), Take  by mouth., Disp: , Rfl:   •  busPIRone (BUSPAR) 10 MG tablet, , Disp: , Rfl:   •  CALCIUM PO, Take  by mouth., Disp: , Rfl:   •  CloNIDine (CATAPRES) 0.3 MG tablet, , Disp: , Rfl:   •  Cyanocobalamin (B-12 PO), Take  by mouth., Disp: , Rfl:   •  diphenhydrAMINE (BENADRYL) 25 mg capsule, Take 25 mg by mouth daily., Disp: , Rfl:   •  fosinopril (MONOPRIL) 40 MG tablet, , Disp: , Rfl:   •  hydrochlorothiazide (HYDRODIURIL) 50 MG tablet, , Disp: , Rfl:   •  levothyroxine (SYNTHROID, LEVOTHROID) 150 MCG tablet, , Disp: , Rfl:   •  Multiple Vitamin (MULTI VITAMIN DAILY PO), Take  by mouth., Disp: , Rfl:   •  venlafaxine (EFFEXOR) 75 MG tablet, , Disp: , Rfl:   •  CLONIDINE HCL PO, , Disp: , Rfl:   •  phentermine (ADIPEX-P) 37.5 MG  tablet, Take 37.5 mg by mouth Every Morning., Disp: , Rfl: 0  •  TROKENDI XR 50 MG capsule sustained-release 24 hr, Take 1 capsule by mouth every night at bedtime., Disp: , Rfl: 0    ALLERGIES:     Allergies   Allergen Reactions   • Penicillins Unknown - Low Severity       SOCIAL HISTORY:       Social History     Socioeconomic History   • Marital status:      Spouse name: Not on file   • Number of children: 0   • Years of education: COLLEGE   • Highest education level: Not on file   Occupational History   • Occupation:    • Occupation: Service Rep     Comment: [employer]   Tobacco Use   • Smoking status: Former Smoker     Packs/day: 0.30     Types: Cigarettes     Last attempt to quit: 4/13/2016     Years since quitting: 3.7   • Tobacco comment: Quit 4/23/16   Substance and Sexual Activity   • Alcohol use: No   • Drug use: No   • Sexual activity: Yes     Partners: Male   Social History Narrative    Has 1 adopted child.         FAMILY HISTORY:  Family History   Problem Relation Age of Onset   • Hypertension Mother    • Diabetes Mother    • Breast cancer Mother 65   • COPD Mother    • Arthritis Mother    • Heart disease Mother    • Alcohol abuse Father    • Cirrhosis Father    • Hypertension Sister    • Fibromyalgia Sister    • Diabetes Sister    • Hypertension Brother    • Heart disease Brother    • Diabetes Brother    • Sarcoidosis Sister        REVIEW OF SYSTEMS:        General: no fever, no chills, no fatigue,no weight changes, no lack of appetite.  Eyes: no epiphora, xerophthalmia,conjunctivitis, pain, glaucoma, blurred vision, blindness, secretion, photophobia, proptosis, diplopia.  Ears: no otorrhea, tinnitus, otorrhagia, deafness, pain, vertigo.  Nose: no rhinorrhea, no epistaxis, no alteration in perception of odors, no sinuses pressure.  Mouth: no alteration in gums or teeth,  No ulcers, no difficulty with mastication or deglut ion, no odynophagia.  Neck: no masses or pain, no thyroid  "alterations, no pain in muscles or arteries, no carotid odynia, no crepitation.  Respiratory: no cough, no sputum production,no dyspnea,no trepopnea, no pleuritic pain,no hemoptysis.  Heart: no syncope, no irregularity, no palpitations, no angina,no orthopnea,no paroxysmal nocturnal dyspnea.  Vascular Venous: no tenderness,no edema,no palpable cords,no postphlebitic syndrome, no skin changes no ulcerations.  Vascular Arterial: no distal ischemia, noclaudication, no gangrene, no neuropathic ischemic pain, no skin ulcers, no paleness no cyanosis.  GI: no dysphagia, no odynophagia, no regurgitation, no heartburn,no indigestion,no nausea,no vomiting,no hematemesis ,no melena,no jaundice,no distention, no obstipation,no enterorrhagia,no proctalgia,no anal  lesions, no changes in bowel habits.  : no frequency, no hesitancy, no hematuria, no discharge,no  pain.  Musculoskeletal: no muscle or tendon pain or inflammation,no  joint pain, no edema, no functional limitation,no fasciculations, no mass.  Neurologic: no headache, no seizures, noalterations on Craneal nerves, no motor deficit, no sensory deficit, normal coordination, no alteration in memory,normal orientation, calculation,normal writting, verbal and written language.  Skin: no rashes,no pruritus no localized lesions.  Psychiatric: no anxiety, no depression,no agitation, no delusions, proper insight.      Objective    Vitals:    01/16/20 1620   BP: 144/85   Pulse: 85   Resp: 16   Temp: 98.3 °F (36.8 °C)   TempSrc: Oral   SpO2: 96%   Weight: 101 kg (223 lb 3.2 oz)   Height: 167 cm (65.75\")   PainSc: 0-No pain     Current Status 1/16/2020   ECOG score 0      PHYSICAL EXAM:          GENERAL:  Well-developed, well-nourished  Patient  in no acute distress.   SKIN:  Warm, dry ,NO rashes,NO purpura ,NO petechiae.  HEENT:  Pupils were equal and reactive to light and accomodation, conjunctivas non injected, no pterigion, normal extraocular movements, normal visual acuity. "   Mouth mucosa was moist, no exudates in oropharynx, normal gum line, normal roof of the mouth and pillars, normal papillations of the tongue.  NECK:  Supple with good range of motion; no thyromegaly or masses, no JVD or bruits, no cervical adenopathies.No carotid arteries pain, no carotid abnormal pulsation , NO arterial dance.  LYMPHATICS:  No cervical, NO supraclavicular, NO axillary,NO epitrochlear , NO inguinal adenopathy.  CHEST:  Normal excursion of both lina thoraces, normal voice fremitus, no subcutaneous emphysema, normal axillas, no rashes or acanthosis nigricans. Lungs clear to percussion and auscultation, normal breath sounds bilaterally, no wheezing,NO crackles NO ronchi, NO stridor, NO rubs.  CARDIAC AND VASCULAR:  normal rate and regular rhythm, without murmurs,NO rubs NO S3 NO S4 right or left . Normal femoral, popliteal, pedis, brachial and carotid pulses.  ABDOMEN:  Soft, nontender with no organomegaly or masses, no ascites, no collateral circulation,no distention,no Martínez sign, no abdominal pain, no inguinal hernias,no umbilical hernia, no abdominal bruits. Normal bowel sounds.  GENITAL: Not  Performed.  EXTREMITIES  AND SPINE:  No clubbing, cyanosis or edema, no deformities or pain .No kyphosis, scoliosis, deformities or pain in spine, ribs or pelvic bone.  NEUROLOGICAL:  Patient was awake, alert, oriented to time, person and place.        RECENT LABS:      WBC   Date/Time Value Ref Range Status   01/16/2020 04:01 PM 4.33 3.40 - 10.80 10*3/mm3 Final     Hemoglobin   Date/Time Value Ref Range Status   01/16/2020 04:01 PM 13.8 12.0 - 15.9 g/dL Final     Platelets   Date/Time Value Ref Range Status   01/16/2020 04:01  (L) 140 - 450 10*3/mm3 Final     Component      Latest Ref Rng & Units 12/19/2018 1/16/2020   WBC      3.40 - 10.80 10*3/mm3 4.55 4.33   RBC      3.77 - 5.28 10*6/mm3 5.07 (H) 4.93   Hemoglobin      12.0 - 15.9 g/dL 14.1 13.8   Hematocrit      34.0 - 46.6 % 42.2 41.7   MCV       79.0 - 97.0 fL 83.2 84.6   MCH      26.6 - 33.0 pg 27.8 28.0   MCHC      31.5 - 35.7 g/dL 33.4 33.1   RDW      12.3 - 15.4 % 12.7 12.9   RDW-SD      37.0 - 54.0 fl 38.2 39.4   MPV      6.0 - 12.0 fL 13.1 (H) 13.3 (H)   Platelets      140 - 450 10*3/mm3 127 (L) 111 (L)   Neutrophil Rel %      42.7 - 76.0 % 33.5 (L) 41.0 (L)   Lymphocyte Rel %      19.6 - 45.3 % 57.6 (H) 48.3 (H)   Monocyte Rel %      5.0 - 12.0 % 8.1 9.5   Eosinophil Rel %      0.3 - 6.2 % 0.2 (L) 0.5   Basophil Rel %      0.0 - 1.5 % 0.2 0.5   Immature Granulocyte Rel %      0.0 - 0.5 % 0.4 0.2   Neutrophils Absolute      1.70 - 7.00 10*3/mm3 1.52 1.78   Lymphocytes Absolute      0.70 - 3.10 10*3/mm3 2.62 2.09     Assessment/Plan    This patient has minimal leukopenia with lymphocytosis, otherwise asymptomatic, and is very likely that this is representation of leukopenia of .  We have tested this patient during the original consultation for hemoglobinopathy, having a normal hemoglobin electrophoresis.  Also the patient had a normal ferritin, normal iron profile, normal folic acid level and a marginally low B12 level.  The patient had serum protein electrophoresis and immunofixation that were negative for monoclonal proteins.  Also the patient had peripheral blood flow cytometry that failed to document any alteration of monoclonality in regard to her lymphocyte population.  Given these facts, we advised the patient with a normal physical examination TO RETURN TO SEE US IN ONE YEAR WITH CBC. Today the patient has minimal thrombocytopenia that probably has no implications. The patient’s physical exam is otherwise unremarkable. Therefore, under the present premises, I advised the patient to come back and see me in a year with a CBC. On 01/16/2020, the patient is asymptomatic with a normal physical exam; completely worried and paralyzed about this visit. To my eyes, the white count, hemoglobin and platelets look about the same as they  have looked for the last several years since I saw her in consultation the first time. The patient has no symptoms of anything. I do not think the medicines that she takes have anything to do with her issues related to her hematological condition and right now I do not justify the need of any other intervention. In the past we have done extensive assessment that disclosed no abnormalities to speak of. These numbers have not changed. The only way to reach a definitive diagnosis is to proceed with bone marrow testing. Right now I do not recommend this procedure on her. The patient again is paralyzed by the fact that she needs to come back and see us once in a while. Discussing a bone marrow with this patient is going to require more than normal environment and more than 1 counseling. I am not going to proceed with this unless that the numbers change radically. I have not prescribed any medicines.

## 2020-02-07 ENCOUNTER — HOSPITAL ENCOUNTER (OUTPATIENT)
Dept: OTHER | Facility: HOSPITAL | Age: 60
Discharge: HOME | End: 2020-02-07
Attending: NURSE PRACTITIONER

## 2020-02-07 LAB
ALBUMIN SERPL-MCNC: 4.3 G/DL (ref 3.5–5)
ALBUMIN/GLOB SERPL: 1.5 {RATIO} (ref 1.4–2.6)
ALP SERPL-CCNC: 69 U/L (ref 53–141)
ALT SERPL-CCNC: 14 U/L (ref 10–40)
ANION GAP SERPL CALC-SCNC: 17 MMOL/L (ref 8–19)
APPEARANCE UR: CLEAR
AST SERPL-CCNC: 15 U/L (ref 15–50)
BILIRUB SERPL-MCNC: 0.21 MG/DL (ref 0.2–1.3)
BILIRUB UR QL: NEGATIVE
BUN SERPL-MCNC: 9 MG/DL (ref 5–25)
BUN/CREAT SERPL: 11 {RATIO} (ref 6–20)
CALCIUM SERPL-MCNC: 9.6 MG/DL (ref 8.7–10.4)
CHLORIDE SERPL-SCNC: 101 MMOL/L (ref 99–111)
COLOR UR: YELLOW
CONV CO2: 28 MMOL/L (ref 22–32)
CONV COLLECTION SOURCE (UA): NORMAL
CONV TOTAL PROTEIN: 7.2 G/DL (ref 6.3–8.2)
CONV UROBILINOGEN IN URINE BY AUTOMATED TEST STRIP: 1 {EHRLICHU}/DL (ref 0.1–1)
CREAT UR-MCNC: 0.84 MG/DL (ref 0.5–0.9)
GFR SERPLBLD BASED ON 1.73 SQ M-ARVRAT: >60 ML/MIN/{1.73_M2}
GLOBULIN UR ELPH-MCNC: 2.9 G/DL (ref 2–3.5)
GLUCOSE SERPL-MCNC: 81 MG/DL (ref 65–99)
GLUCOSE UR QL: NEGATIVE MG/DL
HGB UR QL STRIP: NEGATIVE
KETONES UR QL STRIP: NEGATIVE MG/DL
LEUKOCYTE ESTERASE UR QL STRIP: NEGATIVE
NITRITE UR QL STRIP: NEGATIVE
OSMOLALITY SERPL CALC.SUM OF ELEC: 294 MOSM/KG (ref 273–304)
PH UR STRIP.AUTO: 5 [PH] (ref 5–8)
POTASSIUM SERPL-SCNC: 3.2 MMOL/L (ref 3.5–5.3)
PROT UR QL: NEGATIVE MG/DL
SODIUM SERPL-SCNC: 143 MMOL/L (ref 135–147)
SP GR UR: 1.02 (ref 1–1.03)

## 2020-04-30 ENCOUNTER — HOSPITAL ENCOUNTER (OUTPATIENT)
Dept: LAB | Facility: HOSPITAL | Age: 60
Discharge: HOME | End: 2020-04-30
Attending: NURSE PRACTITIONER

## 2020-04-30 LAB
ALBUMIN SERPL-MCNC: 4.6 G/DL (ref 3.5–5)
ALBUMIN/GLOB SERPL: 1.5 {RATIO} (ref 1.4–2.6)
ALP SERPL-CCNC: 75 U/L (ref 53–141)
ALT SERPL-CCNC: 17 U/L (ref 10–40)
ANION GAP SERPL CALC-SCNC: 18 MMOL/L (ref 8–19)
AST SERPL-CCNC: 17 U/L (ref 15–50)
BILIRUB SERPL-MCNC: 0.29 MG/DL (ref 0.2–1.3)
BUN SERPL-MCNC: 9 MG/DL (ref 5–25)
BUN/CREAT SERPL: 10 {RATIO} (ref 6–20)
CALCIUM SERPL-MCNC: 10.4 MG/DL (ref 8.7–10.4)
CHLORIDE SERPL-SCNC: 102 MMOL/L (ref 99–111)
CONV CO2: 28 MMOL/L (ref 22–32)
CONV TOTAL PROTEIN: 7.6 G/DL (ref 6.3–8.2)
CREAT UR-MCNC: 0.93 MG/DL (ref 0.5–0.9)
GFR SERPLBLD BASED ON 1.73 SQ M-ARVRAT: >60 ML/MIN/{1.73_M2}
GLOBULIN UR ELPH-MCNC: 3 G/DL (ref 2–3.5)
GLUCOSE SERPL-MCNC: 94 MG/DL (ref 65–99)
OSMOLALITY SERPL CALC.SUM OF ELEC: 296 MOSM/KG (ref 273–304)
POTASSIUM SERPL-SCNC: 4.2 MMOL/L (ref 3.5–5.3)
SODIUM SERPL-SCNC: 144 MMOL/L (ref 135–147)

## 2020-05-04 ENCOUNTER — OFFICE VISIT CONVERTED (OUTPATIENT)
Dept: FAMILY MEDICINE CLINIC | Facility: CLINIC | Age: 60
End: 2020-05-04
Attending: NURSE PRACTITIONER

## 2020-05-04 ENCOUNTER — CONVERSION ENCOUNTER (OUTPATIENT)
Dept: FAMILY MEDICINE CLINIC | Facility: CLINIC | Age: 60
End: 2020-05-04

## 2020-05-05 ENCOUNTER — HOSPITAL ENCOUNTER (OUTPATIENT)
Dept: GENERAL RADIOLOGY | Facility: HOSPITAL | Age: 60
Discharge: HOME | End: 2020-05-05
Attending: NURSE PRACTITIONER

## 2020-05-12 ENCOUNTER — HOSPITAL ENCOUNTER (OUTPATIENT)
Dept: ULTRASOUND IMAGING | Facility: HOSPITAL | Age: 60
Discharge: HOME | End: 2020-05-12
Attending: NURSE PRACTITIONER

## 2020-05-29 ENCOUNTER — CONVERSION ENCOUNTER (OUTPATIENT)
Dept: OTHER | Facility: HOSPITAL | Age: 60
End: 2020-05-29

## 2020-05-29 ENCOUNTER — OFFICE VISIT CONVERTED (OUTPATIENT)
Dept: CARDIOLOGY | Facility: CLINIC | Age: 60
End: 2020-05-29
Attending: INTERNAL MEDICINE

## 2020-06-15 ENCOUNTER — HOSPITAL ENCOUNTER (OUTPATIENT)
Dept: LAB | Facility: HOSPITAL | Age: 60
Discharge: HOME | End: 2020-06-15
Attending: INTERNAL MEDICINE

## 2020-06-15 LAB
CHOLEST SERPL-MCNC: 179 MG/DL (ref 107–200)
CHOLEST/HDLC SERPL: 4.2 {RATIO} (ref 3–6)
HDLC SERPL-MCNC: 43 MG/DL (ref 40–60)
LDLC SERPL CALC-MCNC: 122 MG/DL (ref 70–100)
TRIGL SERPL-MCNC: 69 MG/DL (ref 40–150)
VLDLC SERPL-MCNC: 14 MG/DL (ref 5–37)

## 2020-06-16 ENCOUNTER — HOSPITAL ENCOUNTER (OUTPATIENT)
Dept: URGENT CARE | Facility: CLINIC | Age: 60
Discharge: HOME | End: 2020-06-16
Attending: NURSE PRACTITIONER

## 2020-06-23 ENCOUNTER — HOSPITAL ENCOUNTER (OUTPATIENT)
Dept: NUCLEAR MEDICINE | Facility: HOSPITAL | Age: 60
Discharge: HOME | End: 2020-06-23
Attending: INTERNAL MEDICINE

## 2020-07-01 ENCOUNTER — CONVERSION ENCOUNTER (OUTPATIENT)
Dept: CARDIOLOGY | Facility: CLINIC | Age: 60
End: 2020-07-01
Attending: INTERNAL MEDICINE

## 2020-07-20 ENCOUNTER — TELEMEDICINE CONVERTED (OUTPATIENT)
Dept: FAMILY MEDICINE CLINIC | Facility: CLINIC | Age: 60
End: 2020-07-20
Attending: NURSE PRACTITIONER

## 2020-08-12 ENCOUNTER — HOSPITAL ENCOUNTER (OUTPATIENT)
Dept: URGENT CARE | Facility: CLINIC | Age: 60
Discharge: HOME | End: 2020-08-12
Attending: NURSE PRACTITIONER

## 2020-08-17 LAB — SARS-COV-2 RNA SPEC QL NAA+PROBE: NOT DETECTED

## 2020-09-23 ENCOUNTER — HOSPITAL ENCOUNTER (OUTPATIENT)
Dept: LAB | Facility: HOSPITAL | Age: 60
Discharge: HOME | End: 2020-09-23
Attending: NURSE PRACTITIONER

## 2020-09-23 LAB
ALBUMIN SERPL-MCNC: 4.1 G/DL (ref 3.5–5)
ALBUMIN/GLOB SERPL: 1.6 {RATIO} (ref 1.4–2.6)
ALP SERPL-CCNC: 62 U/L (ref 53–141)
ALT SERPL-CCNC: 13 U/L (ref 10–40)
ANION GAP SERPL CALC-SCNC: 15 MMOL/L (ref 8–19)
AST SERPL-CCNC: 16 U/L (ref 15–50)
BILIRUB SERPL-MCNC: 0.4 MG/DL (ref 0.2–1.3)
BUN SERPL-MCNC: 10 MG/DL (ref 5–25)
BUN/CREAT SERPL: 10 {RATIO} (ref 6–20)
CALCIUM SERPL-MCNC: 9.3 MG/DL (ref 8.7–10.4)
CHLORIDE SERPL-SCNC: 102 MMOL/L (ref 99–111)
CHOLEST SERPL-MCNC: 170 MG/DL (ref 107–200)
CHOLEST/HDLC SERPL: 4 {RATIO} (ref 3–6)
CONV CO2: 27 MMOL/L (ref 22–32)
CONV TOTAL PROTEIN: 6.7 G/DL (ref 6.3–8.2)
CREAT UR-MCNC: 1.02 MG/DL (ref 0.5–0.9)
GFR SERPLBLD BASED ON 1.73 SQ M-ARVRAT: >60 ML/MIN/{1.73_M2}
GLOBULIN UR ELPH-MCNC: 2.6 G/DL (ref 2–3.5)
GLUCOSE SERPL-MCNC: 91 MG/DL (ref 65–99)
HDLC SERPL-MCNC: 43 MG/DL (ref 40–60)
LDLC SERPL CALC-MCNC: 105 MG/DL (ref 70–100)
OSMOLALITY SERPL CALC.SUM OF ELEC: 291 MOSM/KG (ref 273–304)
POTASSIUM SERPL-SCNC: 3 MMOL/L (ref 3.5–5.3)
SODIUM SERPL-SCNC: 141 MMOL/L (ref 135–147)
T4 FREE SERPL-MCNC: 1.7 NG/DL (ref 0.9–1.8)
TRIGL SERPL-MCNC: 109 MG/DL (ref 40–150)
TSH SERPL-ACNC: 0.07 M[IU]/L (ref 0.27–4.2)
VLDLC SERPL-MCNC: 22 MG/DL (ref 5–37)

## 2020-10-12 ENCOUNTER — HOSPITAL ENCOUNTER (OUTPATIENT)
Dept: LAB | Facility: HOSPITAL | Age: 60
Discharge: HOME | End: 2020-10-12
Attending: NURSE PRACTITIONER

## 2020-10-12 LAB
ALBUMIN SERPL-MCNC: 4 G/DL (ref 3.5–5)
ALBUMIN/GLOB SERPL: 1.5 {RATIO} (ref 1.4–2.6)
ALP SERPL-CCNC: 59 U/L (ref 53–141)
ALT SERPL-CCNC: 13 U/L (ref 10–40)
ANION GAP SERPL CALC-SCNC: 18 MMOL/L (ref 8–19)
AST SERPL-CCNC: 18 U/L (ref 15–50)
BILIRUB SERPL-MCNC: 0.25 MG/DL (ref 0.2–1.3)
BUN SERPL-MCNC: 12 MG/DL (ref 5–25)
BUN/CREAT SERPL: 10 {RATIO} (ref 6–20)
CALCIUM SERPL-MCNC: 9.7 MG/DL (ref 8.7–10.4)
CHLORIDE SERPL-SCNC: 104 MMOL/L (ref 99–111)
CONV CO2: 24 MMOL/L (ref 22–32)
CONV TOTAL PROTEIN: 6.7 G/DL (ref 6.3–8.2)
CREAT UR-MCNC: 1.23 MG/DL (ref 0.5–0.9)
GFR SERPLBLD BASED ON 1.73 SQ M-ARVRAT: 55 ML/MIN/{1.73_M2}
GLOBULIN UR ELPH-MCNC: 2.7 G/DL (ref 2–3.5)
GLUCOSE SERPL-MCNC: 107 MG/DL (ref 65–99)
OSMOLALITY SERPL CALC.SUM OF ELEC: 296 MOSM/KG (ref 273–304)
POTASSIUM SERPL-SCNC: 3.4 MMOL/L (ref 3.5–5.3)
SODIUM SERPL-SCNC: 143 MMOL/L (ref 135–147)
T4 FREE SERPL-MCNC: 1.4 NG/DL (ref 0.9–1.8)
TSH SERPL-ACNC: 0.21 M[IU]/L (ref 0.27–4.2)

## 2020-10-23 ENCOUNTER — OFFICE VISIT CONVERTED (OUTPATIENT)
Dept: FAMILY MEDICINE CLINIC | Facility: CLINIC | Age: 60
End: 2020-10-23
Attending: NURSE PRACTITIONER

## 2020-11-05 ENCOUNTER — OFFICE VISIT CONVERTED (OUTPATIENT)
Dept: SURGERY | Facility: CLINIC | Age: 60
End: 2020-11-05
Attending: NURSE PRACTITIONER

## 2020-11-23 ENCOUNTER — OFFICE VISIT CONVERTED (OUTPATIENT)
Dept: PLASTIC SURGERY | Facility: CLINIC | Age: 60
End: 2020-11-23
Attending: NURSE PRACTITIONER

## 2020-11-23 ENCOUNTER — CONVERSION ENCOUNTER (OUTPATIENT)
Dept: PLASTIC SURGERY | Facility: CLINIC | Age: 60
End: 2020-11-23

## 2020-12-03 ENCOUNTER — HOSPITAL ENCOUNTER (OUTPATIENT)
Dept: ULTRASOUND IMAGING | Facility: HOSPITAL | Age: 60
Discharge: HOME | End: 2020-12-03
Attending: NURSE PRACTITIONER

## 2020-12-11 ENCOUNTER — CONVERSION ENCOUNTER (OUTPATIENT)
Dept: PLASTIC SURGERY | Facility: CLINIC | Age: 60
End: 2020-12-11

## 2020-12-11 ENCOUNTER — HOSPITAL ENCOUNTER (OUTPATIENT)
Dept: OTHER | Facility: HOSPITAL | Age: 60
Discharge: HOME | End: 2020-12-11
Attending: NURSE PRACTITIONER

## 2020-12-11 ENCOUNTER — PROCEDURE VISIT CONVERTED (OUTPATIENT)
Dept: PLASTIC SURGERY | Facility: CLINIC | Age: 60
End: 2020-12-11
Attending: NURSE PRACTITIONER

## 2020-12-28 ENCOUNTER — OFFICE VISIT CONVERTED (OUTPATIENT)
Dept: PLASTIC SURGERY | Facility: CLINIC | Age: 60
End: 2020-12-28
Attending: NURSE PRACTITIONER

## 2021-01-04 ENCOUNTER — HOSPITAL ENCOUNTER (OUTPATIENT)
Dept: LAB | Facility: HOSPITAL | Age: 61
Discharge: HOME | End: 2021-01-04
Attending: NURSE PRACTITIONER

## 2021-01-04 LAB
ALBUMIN SERPL-MCNC: 4.1 G/DL (ref 3.5–5)
ALBUMIN/GLOB SERPL: 1.6 {RATIO} (ref 1.4–2.6)
ALP SERPL-CCNC: 60 U/L (ref 53–141)
ALT SERPL-CCNC: 12 U/L (ref 10–40)
ANION GAP SERPL CALC-SCNC: 12 MMOL/L (ref 8–19)
AST SERPL-CCNC: 15 U/L (ref 15–50)
BASOPHILS # BLD AUTO: 0.04 10*3/UL (ref 0–0.2)
BASOPHILS NFR BLD AUTO: 1.3 % (ref 0–3)
BILIRUB SERPL-MCNC: 0.27 MG/DL (ref 0.2–1.3)
BUN SERPL-MCNC: 13 MG/DL (ref 5–25)
BUN/CREAT SERPL: 12 {RATIO} (ref 6–20)
CALCIUM SERPL-MCNC: 9.1 MG/DL (ref 8.7–10.4)
CHLORIDE SERPL-SCNC: 103 MMOL/L (ref 99–111)
CHOLEST SERPL-MCNC: 184 MG/DL (ref 107–200)
CHOLEST/HDLC SERPL: 3.7 {RATIO} (ref 3–6)
CONV ABS IMM GRAN: 0 10*3/UL (ref 0–0.2)
CONV CO2: 28 MMOL/L (ref 22–32)
CONV IMMATURE GRAN: 0 % (ref 0–1.8)
CONV TOTAL PROTEIN: 6.7 G/DL (ref 6.3–8.2)
CREAT UR-MCNC: 1.13 MG/DL (ref 0.5–0.9)
DEPRECATED RDW RBC AUTO: 41.8 FL (ref 36.4–46.3)
EOSINOPHIL # BLD AUTO: 0.06 10*3/UL (ref 0–0.7)
EOSINOPHIL # BLD AUTO: 1.9 % (ref 0–7)
ERYTHROCYTE [DISTWIDTH] IN BLOOD BY AUTOMATED COUNT: 13.4 % (ref 11.7–14.4)
GFR SERPLBLD BASED ON 1.73 SQ M-ARVRAT: >60 ML/MIN/{1.73_M2}
GLOBULIN UR ELPH-MCNC: 2.6 G/DL (ref 2–3.5)
GLUCOSE SERPL-MCNC: 89 MG/DL (ref 65–99)
HCT VFR BLD AUTO: 40.5 % (ref 37–47)
HDLC SERPL-MCNC: 50 MG/DL (ref 40–60)
HGB BLD-MCNC: 12.9 G/DL (ref 12–16)
LDLC SERPL CALC-MCNC: 111 MG/DL (ref 70–100)
LYMPHOCYTES # BLD AUTO: 1.49 10*3/UL (ref 1–5)
LYMPHOCYTES NFR BLD AUTO: 47.5 % (ref 20–45)
MCH RBC QN AUTO: 27.4 PG (ref 27–31)
MCHC RBC AUTO-ENTMCNC: 31.9 G/DL (ref 33–37)
MCV RBC AUTO: 86 FL (ref 81–99)
MONOCYTES # BLD AUTO: 0.33 10*3/UL (ref 0.2–1.2)
MONOCYTES NFR BLD AUTO: 10.5 % (ref 3–10)
NEUTROPHILS # BLD AUTO: 1.22 10*3/UL (ref 2–8)
NEUTROPHILS NFR BLD AUTO: 38.8 % (ref 30–85)
NRBC CBCN: 0 % (ref 0–0.7)
OSMOLALITY SERPL CALC.SUM OF ELEC: 290 MOSM/KG (ref 273–304)
PLATELET # BLD AUTO: 167 10*3/UL (ref 130–400)
PMV BLD AUTO: 14.5 FL (ref 9.4–12.3)
POTASSIUM SERPL-SCNC: 3.1 MMOL/L (ref 3.5–5.3)
RBC # BLD AUTO: 4.71 10*6/UL (ref 4.2–5.4)
SODIUM SERPL-SCNC: 140 MMOL/L (ref 135–147)
T4 FREE SERPL-MCNC: 1.2 NG/DL (ref 0.9–1.8)
TRIGL SERPL-MCNC: 116 MG/DL (ref 40–150)
TSH SERPL-ACNC: 6.47 M[IU]/L (ref 0.27–4.2)
VLDLC SERPL-MCNC: 23 MG/DL (ref 5–37)
WBC # BLD AUTO: 3.14 10*3/UL (ref 4.8–10.8)

## 2021-01-06 LAB
APPEARANCE UR: ABNORMAL
BILIRUB UR QL: NEGATIVE
COLOR UR: YELLOW
CONV BACTERIA: NEGATIVE
CONV COLLECTION SOURCE (UA): ABNORMAL
CONV HYALINE CASTS IN URINE MICRO: ABNORMAL /[LPF]
CONV UROBILINOGEN IN URINE BY AUTOMATED TEST STRIP: 1 {EHRLICHU}/DL (ref 0.1–1)
GLUCOSE UR QL: NEGATIVE MG/DL
HGB UR QL STRIP: NEGATIVE
KETONES UR QL STRIP: NEGATIVE MG/DL
LEUKOCYTE ESTERASE UR QL STRIP: ABNORMAL
NITRITE UR QL STRIP: NEGATIVE
PH UR STRIP.AUTO: 7 [PH] (ref 5–8)
PROT UR QL: NEGATIVE MG/DL
RBC #/AREA URNS HPF: ABNORMAL /[HPF]
SP GR UR: 1.02 (ref 1–1.03)
SQUAMOUS SPT QL MICRO: ABNORMAL /[HPF]
WBC #/AREA URNS HPF: ABNORMAL /[HPF]

## 2021-01-08 ENCOUNTER — CONVERSION ENCOUNTER (OUTPATIENT)
Dept: FAMILY MEDICINE CLINIC | Facility: CLINIC | Age: 61
End: 2021-01-08

## 2021-01-08 ENCOUNTER — OFFICE VISIT CONVERTED (OUTPATIENT)
Dept: FAMILY MEDICINE CLINIC | Facility: CLINIC | Age: 61
End: 2021-01-08
Attending: NURSE PRACTITIONER

## 2021-01-14 ENCOUNTER — APPOINTMENT (OUTPATIENT)
Dept: LAB | Facility: HOSPITAL | Age: 61
End: 2021-01-14

## 2021-01-15 ENCOUNTER — HOSPITAL ENCOUNTER (OUTPATIENT)
Dept: GASTROENTEROLOGY | Facility: HOSPITAL | Age: 61
Setting detail: HOSPITAL OUTPATIENT SURGERY
Discharge: HOME | End: 2021-01-15
Attending: SURGERY

## 2021-01-29 ENCOUNTER — OFFICE VISIT CONVERTED (OUTPATIENT)
Dept: SURGERY | Facility: CLINIC | Age: 61
End: 2021-01-29
Attending: SURGERY

## 2021-02-04 ENCOUNTER — LAB (OUTPATIENT)
Dept: LAB | Facility: HOSPITAL | Age: 61
End: 2021-02-04

## 2021-02-04 ENCOUNTER — OFFICE VISIT (OUTPATIENT)
Dept: ONCOLOGY | Facility: CLINIC | Age: 61
End: 2021-02-04

## 2021-02-04 VITALS
BODY MASS INDEX: 32.61 KG/M2 | HEART RATE: 85 BPM | SYSTOLIC BLOOD PRESSURE: 138 MMHG | TEMPERATURE: 98.2 F | RESPIRATION RATE: 18 BRPM | DIASTOLIC BLOOD PRESSURE: 84 MMHG | OXYGEN SATURATION: 98 % | WEIGHT: 202.9 LBS | HEIGHT: 66 IN

## 2021-02-04 DIAGNOSIS — D70.8 OTHER NEUTROPENIA (HCC): ICD-10-CM

## 2021-02-04 DIAGNOSIS — D72.820 LYMPHOCYTOSIS: Primary | ICD-10-CM

## 2021-02-04 DIAGNOSIS — D69.6 THROMBOCYTOPENIA, ACQUIRED (HCC): Primary | ICD-10-CM

## 2021-02-04 DIAGNOSIS — D72.820 LYMPHOCYTOSIS: ICD-10-CM

## 2021-02-04 LAB
BASOPHILS # BLD AUTO: 0.03 10*3/MM3 (ref 0–0.2)
BASOPHILS NFR BLD AUTO: 0.8 % (ref 0–1.5)
DEPRECATED RDW RBC AUTO: 38.2 FL (ref 37–54)
EOSINOPHIL # BLD AUTO: 0.04 10*3/MM3 (ref 0–0.4)
EOSINOPHIL NFR BLD AUTO: 1.1 % (ref 0.3–6.2)
ERYTHROCYTE [DISTWIDTH] IN BLOOD BY AUTOMATED COUNT: 12.6 % (ref 12.3–15.4)
HCT VFR BLD AUTO: 40.9 % (ref 34–46.6)
HGB BLD-MCNC: 14.1 G/DL (ref 12–15.9)
IMM GRANULOCYTES # BLD AUTO: 0.02 10*3/MM3 (ref 0–0.05)
IMM GRANULOCYTES NFR BLD AUTO: 0.6 % (ref 0–0.5)
LYMPHOCYTES # BLD AUTO: 1.62 10*3/MM3 (ref 0.7–3.1)
LYMPHOCYTES NFR BLD AUTO: 45.3 % (ref 19.6–45.3)
MCH RBC QN AUTO: 28.7 PG (ref 26.6–33)
MCHC RBC AUTO-ENTMCNC: 34.5 G/DL (ref 31.5–35.7)
MCV RBC AUTO: 83.3 FL (ref 79–97)
MONOCYTES # BLD AUTO: 0.4 10*3/MM3 (ref 0.1–0.9)
MONOCYTES NFR BLD AUTO: 11.2 % (ref 5–12)
NEUTROPHILS NFR BLD AUTO: 1.47 10*3/MM3 (ref 1.7–7)
NEUTROPHILS NFR BLD AUTO: 41 % (ref 42.7–76)
NRBC BLD AUTO-RTO: 0 /100 WBC (ref 0–0.2)
PLATELET # BLD AUTO: 159 10*3/MM3 (ref 140–450)
PMV BLD AUTO: 13.4 FL (ref 6–12)
RBC # BLD AUTO: 4.91 10*6/MM3 (ref 3.77–5.28)
WBC # BLD AUTO: 3.58 10*3/MM3 (ref 3.4–10.8)

## 2021-02-04 PROCEDURE — 36415 COLL VENOUS BLD VENIPUNCTURE: CPT

## 2021-02-04 PROCEDURE — 99213 OFFICE O/P EST LOW 20 MIN: CPT | Performed by: INTERNAL MEDICINE

## 2021-02-04 PROCEDURE — 85025 COMPLETE CBC W/AUTO DIFF WBC: CPT

## 2021-02-04 RX ORDER — TOPIRAMATE 50 MG/1
50 TABLET, FILM COATED ORAL 2 TIMES DAILY
COMMUNITY
Start: 2020-11-07 | End: 2022-06-06 | Stop reason: SDUPTHER

## 2021-02-04 RX ORDER — POTASSIUM CHLORIDE 750 MG/1
TABLET, FILM COATED, EXTENDED RELEASE ORAL
COMMUNITY
Start: 2021-01-05 | End: 2021-06-30

## 2021-02-04 NOTE — PROGRESS NOTES
Subjective . No SYMPTOMS    REASONS FOR FOLLOW UP:  LEUKOPENIA AND LYMPHOCYTOSIS.MARGINAL LOW B12 LEVEL.mild thrombocytopenia    HISTORY OF PRESENT ILLNESS:   PATIENT WAS CALLED THE DAY BEFORE BY THE OFFICE TO ASK FOR SYMPTOMS THAT COULD BE CONSISTENT WITH CORONAVIRUS INFECTION, AND BEING NEGATIVE WAS SCHEDULED TO BE SEEN IN THE OFFICE TODAY. SIMILAR QUESTIONING TODAY INCLUDING, CHILLS, FEVER, NEW COUGH, SHORTNESS OF BREATH, DIARRHEA,DIFFUSE BODY ACHES  AND CHANGES IN SMELL OR TASTE WERE NEGATIVE.THE PATIENT DENIED ANY CONTACT WITH PERSONS WHO WERE POSITIVE FOR COVID, AND PATIENT IS NOT IN CATEGORY OF HIGH RISK BEHAVIOR TO ACQUIRE COVID.    DURING THE VISIT WITH THE PATIENT TODAY , PATIENT HAD FACE MASK, MY MEDICAL ASSISTANT AND I  HAD PROPPER PROTECTIVE EQUIPMENT, AND I DID HAND HYGIENE WITH SOAP AND WATER BEFORE AND AFTER THE VISIT.    This patient returns today to the office for followup stating that she has had her mammogram and colonoscopy last year with no difficulties whatsoever. She remains in excellent shape clinically and physically. 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 She states that she has a terrific appetite. Bowel irregularity is the role of the day and never has been normal. She has no passage of blood in the stool. She has plenty of energy and she is able to do anything that she pleases with no limitations. No cardiovascular or respiratory problems. She remains on multiple medicines for her comorbidities. She has not had any fever, bleeding or infection. No bone or joint pain.                     Past Medical History:   Diagnosis Date   • Benign heart murmur    • Hypertension    • Hypothyroidism    • Leukopenia    • Osteopenia    • Sickle cell anemia without crisis (CMS/HCC)     SICKLE CELL TRAIT ONLY     Past Surgical History:   Procedure Laterality Date   • BILATERAL BREAST  REDUCTION Bilateral    • COLONOSCOPY  2021   • HYSTERECTOMY             MEDICATIONS    Current Outpatient Medications:   •  amLODIPine (NORVASC) 10 MG tablet, , Disp: , Rfl:   •  aspirin 81 MG EC tablet, Take 81 mg by mouth daily., Disp: , Rfl:   •  buPROPion HCl (WELLBUTRIN PO), Take  by mouth., Disp: , Rfl:   •  busPIRone (BUSPAR) 10 MG tablet, , Disp: , Rfl:   •  CALCIUM PO, Take  by mouth., Disp: , Rfl:   •  CloNIDine (CATAPRES) 0.3 MG tablet, , Disp: , Rfl:   •  Cyanocobalamin (B-12 PO), Take  by mouth., Disp: , Rfl:   •  diphenhydrAMINE (BENADRYL) 25 mg capsule, Take 25 mg by mouth daily., Disp: , Rfl:   •  fosinopril (MONOPRIL) 40 MG tablet, , Disp: , Rfl:   •  hydrochlorothiazide (HYDRODIURIL) 50 MG tablet, , Disp: , Rfl:   •  levothyroxine (SYNTHROID, LEVOTHROID) 150 MCG tablet, , Disp: , Rfl:   •  Multiple Vitamin (MULTI VITAMIN DAILY PO), Take  by mouth., Disp: , Rfl:   •  phentermine (ADIPEX-P) 37.5 MG tablet, Take 37.5 mg by mouth Every Morning., Disp: , Rfl: 0  •  potassium chloride 10 MEQ CR tablet, , Disp: , Rfl:   •  topiramate (TOPAMAX) 50 MG tablet, , Disp: , Rfl:   •  venlafaxine (EFFEXOR) 75 MG tablet, , Disp: , Rfl:     ALLERGIES:     Allergies   Allergen Reactions   • Penicillins Unknown - Low Severity       SOCIAL HISTORY:       Social History     Socioeconomic History   • Marital status:      Spouse name: Not on file   • Number of children: 0   • Years of education: COLLEGE   • Highest education level: Not on file   Occupational History   • Occupation:    • Occupation: Service Rep     Comment: [employer]   Tobacco Use   • Smoking status: Former Smoker     Packs/day: 0.30     Types: Cigarettes     Quit date: 2016     Years since quittin.8   • Smokeless tobacco: Never Used   • Tobacco comment: Quit 16   Substance and Sexual Activity   • Alcohol use: No   • Drug use: No   • Sexual activity: Yes     Partners: Male   Social History Narrative    Has 1  "adopted child.         FAMILY HISTORY:  Family History   Problem Relation Age of Onset   • Hypertension Mother    • Diabetes Mother    • Breast cancer Mother 65   • COPD Mother    • Arthritis Mother    • Heart disease Mother    • Alcohol abuse Father    • Cirrhosis Father    • Hypertension Sister    • Fibromyalgia Sister    • Diabetes Sister    • Hypertension Brother    • Heart disease Brother    • Diabetes Brother    • Sarcoidosis Sister          Objective    Vitals:    02/04/21 1622   BP: 138/84   Pulse: 85   Resp: 18   Temp: 98.2 °F (36.8 °C)   TempSrc: Temporal   SpO2: 98%   Weight: 92 kg (202 lb 14.4 oz)   Height: 167.6 cm (66\")  Comment: new yearly height   PainSc: 0-No pain     Current Status 2/4/2021   ECOG score 0      PHYSICAL EXAM:            I HAVE PERSONALLY REVIEWED THE HISTORY OF THE PRESENT ILLNESS, PAST MEDICAL HISTORY, FAMILY HISTORY, SOCIAL HISTORY, ALLERGIES, MEDICATIONS STATED ABOVE IN THE OFFICE NOTE FROM TODAY.        GENERAL:  Well-developed, well-nourished  Patient  in no acute distress.   SKIN:  Warm, dry ,NO rashes,NO purpura ,NO petechiae.  HEENT:  Pupils were equal and reactive to light and accomodation, conjunctivae noninjected, no pterygium, normal extraocular movements, normal visual acuity.   NECK:  Supple with good range of motion; no thyromegaly or masses, no JVD or bruits, no cervical adenopathies.No carotid artery pain, no carotid abnormal pulsation , NO arterial dance.  LYMPHATICS:  No cervical, NO supraclavicular, NO axillary,NO epitrochlear , NO inguinal adenopathy.  CARDIAC   normal rate and regular rhythm, without murmur,NO rubs NO S3 NO S4 right or left . Normal femoral, popliteal, pedis, brachial and carotid pulses.  VASCULAR ARTERIAL: normal carotids,brachial,radial,femoral,popliteal, pedis pulses , no bruits.no paleness or cyanosis, no pain, no edema, no numbness, no gangrene.  VASCULAR VENOUS: no cyanosis, collateral circulation, varicosities, edema, palpable cords, " pain, erythema.  ABDOMEN:  Soft, nontender with no hepatomegaly, no splenomegaly,no masses, no ascites, no collateral circulation,no distention,no Martínez sign, no abdominal pain, no inguinal hernias,no umbilical hernia, no abdominal bruits. Normal bowel sounds.  GENITAL: Not  Performed.  EXTREMITIES  AND SPINE:  No clubbing, cyanosis or edema, no deformities or pain .No kyphosis, scoliosis, deformities or pain in spine, ribs or pelvic bone.  NEUROLOGICAL:  Patient was awake, alert, oriented to time, person and place.          RECENT LABS:      WBC   Date/Time Value Ref Range Status   01/16/2020 04:01 PM 4.33 3.40 - 10.80 10*3/mm3 Final     Hemoglobin   Date/Time Value Ref Range Status   01/16/2020 04:01 PM 13.8 12.0 - 15.9 g/dL Final     Platelets   Date/Time Value Ref Range Status   01/16/2020 04:01  (L) 140 - 450 10*3/mm3 Final         Assessment/Plan    This patient has minimal leukopenia with lymphocytosis, otherwise asymptomatic, and is very likely that this is representation of leukopenia of .  We have tested this patient during the original consultation for hemoglobinopathy, having a normal hemoglobin electrophoresis.  Also the patient had a normal ferritin, normal iron profile, normal folic acid level and a marginally low B12 level.  The patient had serum protein electrophoresis and immunofixation that were negative for monoclonal proteins.  Also the patient had peripheral blood flow cytometry that failed to document any alteration of monoclonality in regard to her lymphocyte population.    The patient returned to the office on 02/04/2021 with no symptoms of anything. Her physical exam was unremarkable. Her white count, platelet count and hemoglobin remain about the same as before. She is up to date in colonoscopy and mammogram. Her overall health remains excellent. I have not advised her to change anything and I would like to review her back in a year.

## 2021-03-10 ENCOUNTER — OFFICE VISIT CONVERTED (OUTPATIENT)
Dept: PLASTIC SURGERY | Facility: CLINIC | Age: 61
End: 2021-03-10
Attending: NURSE PRACTITIONER

## 2021-04-09 ENCOUNTER — HOSPITAL ENCOUNTER (OUTPATIENT)
Dept: LAB | Facility: HOSPITAL | Age: 61
Discharge: HOME | End: 2021-04-09
Attending: NURSE PRACTITIONER

## 2021-04-09 LAB
ALBUMIN SERPL-MCNC: 3.8 G/DL (ref 3.5–5)
ALBUMIN/GLOB SERPL: 1.5 {RATIO} (ref 1.4–2.6)
ALP SERPL-CCNC: 52 U/L (ref 53–141)
ALT SERPL-CCNC: 12 U/L (ref 10–40)
ANION GAP SERPL CALC-SCNC: 13 MMOL/L (ref 8–19)
AST SERPL-CCNC: 13 U/L (ref 15–50)
BASOPHILS # BLD AUTO: 0.03 10*3/UL (ref 0–0.2)
BASOPHILS NFR BLD AUTO: 1 % (ref 0–3)
BILIRUB SERPL-MCNC: 0.35 MG/DL (ref 0.2–1.3)
BUN SERPL-MCNC: 10 MG/DL (ref 5–25)
BUN/CREAT SERPL: 12 {RATIO} (ref 6–20)
CALCIUM SERPL-MCNC: 9.1 MG/DL (ref 8.7–10.4)
CHLORIDE SERPL-SCNC: 104 MMOL/L (ref 99–111)
CONV ABS IMM GRAN: 0.01 10*3/UL (ref 0–0.2)
CONV CO2: 27 MMOL/L (ref 22–32)
CONV IMMATURE GRAN: 0.3 % (ref 0–1.8)
CONV TOTAL PROTEIN: 6.4 G/DL (ref 6.3–8.2)
CREAT UR-MCNC: 0.85 MG/DL (ref 0.5–0.9)
DEPRECATED RDW RBC AUTO: 38.4 FL (ref 36.4–46.3)
EOSINOPHIL # BLD AUTO: 0.06 10*3/UL (ref 0–0.7)
EOSINOPHIL # BLD AUTO: 2.1 % (ref 0–7)
ERYTHROCYTE [DISTWIDTH] IN BLOOD BY AUTOMATED COUNT: 12.3 % (ref 11.7–14.4)
GFR SERPLBLD BASED ON 1.73 SQ M-ARVRAT: >60 ML/MIN/{1.73_M2}
GLOBULIN UR ELPH-MCNC: 2.6 G/DL (ref 2–3.5)
GLUCOSE SERPL-MCNC: 87 MG/DL (ref 65–99)
HCT VFR BLD AUTO: 39.3 % (ref 37–47)
HGB BLD-MCNC: 12.7 G/DL (ref 12–16)
LYMPHOCYTES # BLD AUTO: 1.22 10*3/UL (ref 1–5)
LYMPHOCYTES NFR BLD AUTO: 41.9 % (ref 20–45)
MCH RBC QN AUTO: 27.7 PG (ref 27–31)
MCHC RBC AUTO-ENTMCNC: 32.3 G/DL (ref 33–37)
MCV RBC AUTO: 85.8 FL (ref 81–99)
MONOCYTES # BLD AUTO: 0.3 10*3/UL (ref 0.2–1.2)
MONOCYTES NFR BLD AUTO: 10.3 % (ref 3–10)
NEUTROPHILS # BLD AUTO: 1.29 10*3/UL (ref 2–8)
NEUTROPHILS NFR BLD AUTO: 44.4 % (ref 30–85)
NRBC CBCN: 0 % (ref 0–0.7)
OSMOLALITY SERPL CALC.SUM OF ELEC: 288 MOSM/KG (ref 273–304)
PLATELET # BLD AUTO: 159 10*3/UL (ref 130–400)
PMV BLD AUTO: ABNORMAL FL (ref 9.4–12.3)
POTASSIUM SERPL-SCNC: 3.6 MMOL/L (ref 3.5–5.3)
RBC # BLD AUTO: 4.58 10*6/UL (ref 4.2–5.4)
SODIUM SERPL-SCNC: 140 MMOL/L (ref 135–147)
T4 FREE SERPL-MCNC: 1.3 NG/DL (ref 0.9–1.8)
TSH SERPL-ACNC: 0.52 M[IU]/L (ref 0.27–4.2)
WBC # BLD AUTO: 2.91 10*3/UL (ref 4.8–10.8)

## 2021-04-23 ENCOUNTER — CONVERSION ENCOUNTER (OUTPATIENT)
Dept: FAMILY MEDICINE CLINIC | Facility: CLINIC | Age: 61
End: 2021-04-23

## 2021-04-23 ENCOUNTER — OFFICE VISIT CONVERTED (OUTPATIENT)
Dept: FAMILY MEDICINE CLINIC | Facility: CLINIC | Age: 61
End: 2021-04-23
Attending: NURSE PRACTITIONER

## 2021-05-10 NOTE — H&P
History and Physical      Patient Name: Festus French   Patient ID: [id number]   Sex: Female   YOB: 1960    Primary Care Provider: Shin ROBLES   Referring Provider: Shin ROBLES    Visit Date: November 5, 2020    Provider: MARGARET Silvestre   Location: [facility]   Location Address: [address] [place] [address]  [id number]   Location Phone: [phone]          Chief Complaint  · Requesting colonoscopy  · Age 50 or over  · Here today for a pre-surgical colon screening visit  · Personal History of Polyps      History Of Present Illness  The patient is a 60 year old /Black female presenting to the Surgical Specialist office on a referral from Shin ROBLES.   Festus French needs to have a screening colonoscopy.   Patient states that they have had a colonoscopy. 9 years ago   Patient currently complains of: no complaints   Patient Does not have family history of colon cancer.      Patient presents today on a referral from Shin Bhat for screening colonoscopy.  Patient denies any abdominal pain, diarrhea, or rectal bleeding.  Denies any family history of colorectal cancer.  Admits to history of colonic polyps.    5/11- Colonoscopy ([facility]ole): Internal hemorrhoids; Rectum - 2 sessile polyps.       Past Medical History  Disease Name Date Onset Notes   Anxiety --  --    Deafness --  --    Heart Murmur --  --    Hypertension --  --    Screening for osteoporosis 11/2018 --    Screening Mammogram 11/2018 --    Thyroid Problems --  --          Past Surgical History  Procedure Name Date Notes   breast reduction 2014 --    Colonoscopy 05/19/2011 --    Dental Surgery 2002 --    Hysterectomy 1998 --    Neuroma excision 2000 --          Medication List  Name Date Started Instructions   amlodipine 10 mg oral tablet 09/14/2020 TAKE 1 TABLET(10 MG) BY MOUTH EVERY DAY   aspirin 81 mg oral tablet,chewable  chew 1 tablet (81 mg) by oral route once daily    B12 5,000-100 mcg sublingual lozenge  dissolve 1 lozenge by sublingual route daily   Benadryl 25 mg oral capsule  take 1 capsule (25 mg) by oral route every 6 hours as needed   bupropion HCl 300 mg oral tablet extended release 24 hr 09/07/2020 TAKE 1 TABLET(300 MG) BY MOUTH EVERY DAY   buspirone 10 mg oral tablet 09/14/2020 TAKE 1 TABLET(10 MG) BY MOUTH THREE TIMES DAILY   clonidine HCl 0.3 mg oral tablet 09/14/2020 TAKE 1 TABLET BY MOUTH EVERY DAY   cyclobenzaprine 10 mg oral tablet 09/10/2019 take 1 tablet (10 mg) by oral route 3 times per day as needed. May make sleepy   fosinopril 40 mg oral tablet 12/17/2019 TAKE 1 TABLET(40 MG) BY MOUTH EVERY DAY   hydrochlorothiazide 50 mg oral tablet 09/14/2020 TAKE 1 TABLET(50 MG) BY MOUTH EVERY DAY   levothyroxine 137 mcg oral tablet 09/24/2020 take 1 tablet (137 mcg) by oral route once daily for 90 days   meloxicam 7.5 mg oral tablet 06/01/2020 TAKE 1 TABLET(7.5 MG) BY MOUTH EVERY DAY   phentermine 30 mg oral capsule  take 1 capsule (30 mg) by oral route once daily before breakfast   potassium chloride 10 mEq oral tablet extended release 09/24/2020 take 1 tablet (10 meq) by oral route once daily with food for 90 days   Synthroid 150 mcg oral tablet 06/21/2019 take 1 tablet (150 mcg) by oral route once daily for 90 days   triamcinolone acetonide 0.1 % topical cream 04/10/2020 APPLY THIN LAYER EXTERNALLY TO THE AFFECTED AREA TWICE DAILY FOR 21 DAYS   Trokendi XR 50 mg oral capsule,extended release 24hr  take 1 capsule (50 mg) by oral route once daily   Wellbutrin  mg oral tablet extended release 24 hr 09/10/2019 take 1 tablet (300 mg) by oral route once daily for 90 days         Allergy List  Allergen Name Date Reaction Notes   Morphine Sulfate --  --  --    PENICILLINS --  --  --        Allergies Reconciled  Family Medical History  Disease Name Relative/Age Notes   Heart Disease Father/  Mother/   sibling   Cancer, Unspecified Aunt/  Mother/  Sister/   breast  "  Diabetes Mother/   sibling         Social History  Finding Status Start/Stop Quantity Notes   Alcohol Current some day --/-- 1 per week --    Tobacco Former 51/-- 1/4ppd smoked for 10 yrs prior to quitting/es         Review of Systems  · Constitutional  o Denies  o : fever, chills  · Eyes  o Denies  o : yellowish discoloration of eyes  · HENT  o Denies  o : difficulty swallowing  · Cardiovascular  o Denies  o : chest pain, chest pain on exertion  · Respiratory  o Denies  o : shortness of breath  · Gastrointestinal  o Denies  o : nausea, vomiting, diarrhea, constipation  · Genitourinary  o Denies  o : abnormal color of urine  · Integument  o Denies  o : rash  · Neurologic  o Denies  o : tingling or numbness  · Musculoskeletal  o Denies  o : joint pain  · Endocrine  o Denies  o : weight gain, weight loss      Vitals  Date Time BP Position Site L\R Cuff Size HR RR TEMP (F) WT  HT  BMI kg/m2 BSA m2 O2 Sat FR L/min FiO2        11/05/2020 10:23 AM       16  201lbs 4oz 5'  6\" 32.48 2.06             Physical Examination  · Constitutional  o Appearance  o : well developed, well-nourished, patient in no apparent distress  · Head and Face  o Head  o :   § Inspection  § : atraumatic, normocephalic  o Face  o :   § Inspection  § : no facial lesions  · Eyes  o Conjunctivae  o : conjunctivae normal  o Sclerae  o : sclerae white  · Neck  o Inspection/Palpation  o : normal appearance, no masses or tenderness, trachea midline  · Respiratory  o Respiratory Effort  o : breathing unlabored  · Skin and Subcutaneous Tissue  o General Inspection  o : no lesions present, no areas of discoloration, skin turgor normal, texture normal  · Neurologic  o Mental Status Examination  o :   § Orientation  § : grossly oriented to person, place and time  § Attention  § : attention normal, concentration abilities normal  § Fund of Knowledge  § : fund of knowledge within normal limits, patient aware of current events  o Gait and Station  o : normal " gait, able to stand without difficulty  · Psychiatric  o Judgement and Insight  o : judgment and insight intact  o Mood and Affect  o : mood normal, affect appropriate          Assessment  · Personal history of colonic polyps     V12.72/Z86.010  · Screening for colon cancer     V76.51/Z12.11  · Pre-op testing     V72.84/Z01.818    Problems Reconciled  Plan  · Orders  o Consent for Colonoscopy Screening-Possible risk/complications, benefits, and alternatives to surgical or invasive procedure have been explained to patient and/or legal guardian. -Patient has been evaluated and can tolerate anesthesia and/or sedation. Risks, benefits, and alternatives to anesthesia and sedation have been explained to patient and/or legal guardian. () - V76.51/Z12.11, V12.72/Z86.010, V72.84/Z01.818 - 12/15/2021  o [facility] [place] - V72.84/Z01.818 - 01/11/2021   [address] @ 3;30  · Medications  o Medications have been Reconciled  o Transition of Care or Provider Policy  · Instructions  o Surgical Facility: [facility]  o Handouts Provided Pre-Procedure Instructions including date, time, and location of procedure.   o PLAN: Proceeed with colonoscopy. Patient understands risks/benefits and is willing to proceed.   o ***Surgical Orders***  o RISK AND BENEFITS:  o Given these options, the patient has verbally expressed an understanding of the risks of the surgery and finds these risks acceptable. Will proceed with surgery as soon as possible.  o O.R. PREP: Per protocol   o IV: Per Anesthesia  o Please sign permit for: Colonoscopy with possible biopsies by Dr. Swain.   o The above History and Physical Examination has been completed within 30 days of admission.  o ***Patient Status***  o Outpatient  o Follow up in the in the office post procedure.  o Advised patient she would need COVID-19 testing prior to procedure. Encourage patient self isolate in between testing and procedure. Patient  verbalizes understands willing to proceed.  o Electronically Identified Patient Education Materials Provided Electronically            Electronically Signed by: MARGARET Silvestre -Author on November 5, 2020 11:18:36 AM

## 2021-05-10 NOTE — PROCEDURES
"   Procedure Note      Patient Name: Festus French   Patient ID: [id number]   Sex: Female   YOB: 1960    Primary Care Provider: Shin ROBLES   Referring Provider: Shin ROBLES    Visit Date: July 1, 2020    Provider: Connor Price MD   Location: [facility]   Location Address: [address] [place] [address], [address]   ALEX Prasad  [id number]   Location Phone: [phone]          FINAL REPORT   TRANSTHORACIC ECHOCARDIOGRAM REPORT    Diagnosis: Chest pain.   Height: 5'6\" Weight: 224 B/P: 155/94 BSA: 2.1   Tech: BNS   MEASUREMENTS:  RVID (Diastole) : RVID. (NORMAL: 0.7 to 2.4 cm max)   LVID (Systole): 2.9 cm (Diastole): 4.8 cm . (NORMAL: 3.7 - 5.4 cm)   Posterior Wall Thickness (Diastole): 0.8 cm. (NORMAL: 0.8 - 1.1 cm)   Septal Thickness (Diastole): 0.6 cm. (NORMAL: 0.7 - 1.2 cm)   LAID (Systole): 3.7 cm. (NORMAL: 1.9 - 3.8 cm)   Aortic Root Diameter (Diastole): 3.1 cm. (NORMAL: 2.0 - 3.7 cm)   COMMENTS:  The patient underwent 2-D, M-Mode, and Doppler examination, including pulse-wave, continuous-wave, and color-flow Doppler analysis; the study is technically adequate. The following findings were noted:   FINDINGS:  MITRAL VALVE: Minor annular calcification. There is mild annular calcification. There is mild mitral regurgitation.   AORTIC VALVE: Mildly calcified. There is no aortic stenosis or regurgitation.   TRICUSPID VALVE: Normal in structure. There is trivial to mild tricuspid regurgitation. No evidence of pulmonary hypertension. The pulmonary valve is not well visualized in this study. There is trace pulmonary insufficiency.   PULMONIC VALVE: Grossly normal.   AORTIC ROOT: Mildly enlarged, 3.8 cm.   LEFT ATRIUM: The left atrium is normal in size. Volume 31 mL/m2.   LEFT VENTRICLE: Normal in size. Normal wall thickness. Ejection fraction 55 to 60%. Left ventricular diastolic function measurements are normal.   RIGHT VENTRICLE: Normal size and function.   RIGHT ATRIUM: " Normal in size.   PERICARDIUM: No pericardial effusion.   INFERIOR VENA CAVA: Normal.   DOPPLER: E/A ratio is 1.3. DT= 179 msec. IVRT is 69 msec. E/E' is 7.   Faxed: 07/09/2020      CONCLUSION:  1.  Normal biventricular systolic function, ejection fraction 55 to 60%.   2.  Normal diastolic function.   3.  Mild mitral and trivial to mild tricuspid regurgitation.   4.  Mildly enlarged aortic root 3.8 cm.        MD EZEQUIEL Sheikh/pap    This note was transcribed by Annabel Leiva.  pap/ezequiel  The above service was transcribed by Annabel Leiva, and I attest to the accuracy of the note.  CBD                 Electronically Signed by: Yuko Leiva-, Other -Author on July 9, 2020 08:02:21 AM  Electronically Co-signed by: Connor Price MD -Reviewer on July 9, 2020 10:52:32 AM

## 2021-05-10 NOTE — H&P
History and Physical      Patient Name: Festus French   Patient ID: [id number]   Sex: Female   YOB: 1960    Primary Care Provider: Shin ROBLES   Referring Provider: Shin ROBLES    Visit Date: November 23, 2020    Provider: MARGARET Ronquillo   [facility]   Location Address: [address] [place] [address]  [id number]   Location Phone: [phone]          History Of Present Illness  Festus French is a 60 year old /Black female who presents to the office today as a consult from Shin ROBLES.      Keloid on left part of chest incision from previous breast reduction in 2014 at [facility] in [place] with (Dr. Bennett Ulrich). Dr. Ulrich states would go away but it has gotten bigger and is tender to touch.  History of keloid with hysterectomy in 2006 but resolved.  Has a lump in the middle of her chest just slightly to right of sternum and is not sure what it is. It has become firmer over time since breast reduction. Dr. Ulrich said area was a fat pocket and inject something possibly a steroid into the fatty pocket but it didn't improve and feels tender and firm now. PMH includes hypertension but is controlled with medication, hypothyroidism is controlled with medication. History of benign heart murmur (no follow up recommended), Was  a former smoker and quit in 2016. Mammogram just a few months ago and was benign.      recommend ordering ultrasound of lump in the middle of her chest and doing in office excision of keloid scar and steroid injection.       Past Medical History  Allergies; Anemia; Anxiety; Arthritis; Deafness; Heart Murmur; Hypertension; Keloid; Night sweats; Screening for osteoporosis; Screening Mammogram; Thyroid Problems         Past Surgical History  breast reduction; Colonoscopy; Dental Surgery; Hysterectomy; Neuroma excision         Medication List  amlodipine 10 mg oral tablet; aspirin 81 mg oral  tablet,chewable; B12 5,000-100 mcg sublingual lozenge; Benadryl 25 mg oral capsule; bupropion HCl 300 mg oral tablet extended release 24 hr; buspirone 10 mg oral tablet; clonidine HCl 0.3 mg oral tablet; cyclobenzaprine 10 mg oral tablet; fosinopril 40 mg oral tablet; hydrochlorothiazide 50 mg oral tablet; levothyroxine 137 mcg oral tablet; meloxicam 7.5 mg oral tablet; phentermine 30 mg oral capsule; potassium chloride 10 mEq oral tablet extended release; Synthroid 150 mcg oral tablet; triamcinolone acetonide 0.1 % topical cream; Trokendi XR 50 mg oral capsule,extended release 24hr; Wellbutrin  mg oral tablet extended release 24 hr         Allergy List  Morphine Sulfate; PENICILLINS       Allergies Reconciled  Family Medical History  Heart Disease; Cancer, Unspecified; Diabetes         Social History  Alcohol (Current some day); Tobacco (Former)         Immunizations  Name Date Admin   Influenza 10/23/2020   Influenza Refused         Review of Systems  · Cardiovascular  o Denies  o : chest pain, lower extremity edema, Heart Attack, Abnormal EKG  · Respiratory  o Denies  o : shortness of breath, wheezing, cough  · Neurologic  o Denies  o : muscular weakness, incoordination, tingling or numbness, headaches  · Psychiatric  o Denies  o : anxiety, depression, difficulty sleeping      Vitals  Date Time BP Position Site L\R Cuff Size HR RR TEMP (F) WT  HT  BMI kg/m2 BSA m2 O2 Sat FR L/min FiO2 HC       11/23/2020 04:16 /85 Sitting    85 - R  98.1     97 %  21%          Physical Examination  · Respiratory  o Respiratory Effort  o : breathing unlabored   · Cardiovascular  o Heart  o : regular rate     Left breast medial incision keloid measuring 2cm x 2 cm, moderately tender, no open areas, well healed scars with some widening; Right chest elevated soft mass just adjacent to sternum about 2 cm below medial right breast  horizontal breast incision, firmer 1.5 cm area in center, mobile, mild tenderness                Assessment  · Keloid     701.4/L91.0  · Chest mass     786.6/R22.2      Plan  · Orders  o Plastics reconstructive visit ([facility]) - - 11/23/2020  o US chest (49821) - 786.6/R22.2 - 11/23/2020   right substernal chest mass.  · Medications  o Medications have been Reconciled  o Transition of Care or Provider Policy  · Instructions  o Will get mammogram. Recommend US of substernal mass, scar revision of left keloid area in office with possible steroid injection, RTC for procedure            Electronically Signed by: MARGARET Ronquillo -Author on November 23, 2020 10:08:09 PM

## 2021-05-10 NOTE — H&P
History and Physical      Patient Name: Festus French   Patient ID: [id number]   Sex: Female   YOB: 1960    Primary Care Provider: Shin ROBLES   Referring Provider: Shin ROBLES    Visit Date: May 29, 2020    Provider: Connor Price MD   Location: [facility]   Location Address: [address] [place] [address]  [id number]          History Of Present Illness  Consult requested by: Shin ROBLES   Dear Shin, I saw Festus French in the office today. This is a 60 year old /Black female. She has no previous cardiac history, other than a cardiac murmur in the past. She was referred due to chest pain. The chest pain started a month or so ago. It is intermittent, described as sharp. It is non-exertional. Some association with anxiety but also seems to be worse with certain position changes. At times it is tender to the touch. She has had a previous breast reduction with significant keloid scar formation in that area. She has not had a recent cardiac evaluation but possibly had a stress test 2 to 3 years ago. Her symptoms are moderately intense. They are intermittent, occurring daily.   PAST MEDICAL HISTORY: includes obesity; hypertension; heart murmur; arthritis.   PSYCHOSOCIAL HISTORY: She quit smoking in 2017. Rare alcohol. She drinks caffeine daily. She is . 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   FAMILY HISTORY: Positive for diabetes and hypertension.   CURRENT MEDICATIONS: include Bupropion; Clonidine 0.3 mg daily; Amlodipine 10 mg daily; Fosinopril 40 mg daily; Levothyroxine 150 mcg daily; HCTZ 50 mg daily; vitamin; aspirin 81 mg daily; Vitamin B12; Benadryl; Buspirone; Meloxicam. The dosage and frequency of the medications were reviewed with the patient.   ALLERGIES: Penicillin.      PAST SURGICAL HISTORY:  Hysterectomy; foot surgery; breast reduction.       Review of Systems  · Constitutional  o Admits  o :  "good general health lately  o Denies  o : fatigue, recent weight changes   · Eyes  o Denies  o : double vision  · HENT  o Admits  o : hearing loss  o Denies  o : ringing, chronic sinus problem, swollen glands in neck  · Cardiovascular  o Admits  o : chest pain  o Denies  o : palpitations (fast, fluttering, or skipping beats), swelling (feet, ankles, hands), shortness of breath while walking or lying flat  · Respiratory  o Denies  o : chronic or frequent cough, asthma or wheezing, COPD  · Gastrointestinal  o Denies  o : ulcers, nausea or vomiting  · Neurologic  o Denies  o : lightheaded or dizzy, stroke, headaches  · Musculoskeletal  o Admits  o : joint pain  o Denies  o : back pain  · Endocrine  o Denies  o : thyroid disease, diabetes, heat or cold intolerance, excessive thirst or urination  · Heme-Lymph  o Denies  o : bleeding or bruising tendency, anemia      Vitals  Date Time BP Position Site L\R Cuff Size HR RR TEMP (F) WT  HT  BMI kg/m2 BSA m2 O2 Sat HC       05/29/2020 12:14 /94 Sitting    79 - R  98.8 235lbs 0oz 5'  6\" 37.93 2.23     05/29/2020 12:14 /88 Sitting                     Physical Examination  · Constitutional  o Appearance  o : Morbidly obese, -American female, pleasant, in no acute distress.  · Head and Face  o HEENT  o : No pallor, anicteric. Eyes normal. Moist mucous membranes.  · Neck  o Inspection/Palpation  o : Supple. No hepatosplenomegaly.  o Jugular Veins  o : No JVD. No carotid bruits.  · Respiratory  o Auscultation of Lungs  o : Clear to auscultation bilaterally. No crackles or wheezing.  · Cardiovascular  o Heart  o : Significant for regular rate and rhythm. Grade II to III holosystolic murmur heard best at the right sternal border but heard across the precordium.  · Gastrointestinal  o Abdominal Examination  o : Soft, non-distended. No palpable hepatosplenomegaly. Bowel sounds heard in all four quadrants.  · Musculoskeletal  o General  o : Normal muscle tone and " strength.  · Skin and Subcutaneous Tissue  o General Inspection  o : No skin rashes.  · Extremities  o Extremities  o : Warm and well perfused. Distal pulses present. No pitting pedal edema.     Her EKG was personally reviewed by me today.  This showed sinus rhythm, borderline inferior Q-waves, questionable old infarct, non-specific intraventricular conduction delay.  Abnormal EKG.  No previous tracing for comparison.    Her laboratory studies from April show normal CMP.    I reviewed her primary care records.           Assessment     1.  Chest pain - By history, somewhat atypical in pattern.  It is sharp, at times seems positional.  Her baseline        EKG is mildly abnormal with borderline inferior Q-waves.  She has cardiac risk factors including obesity and        hypertension.  2.  Systolic heart murmur.       Plan     The patient will be scheduled for stress imaging to evaluate for ischemia.  An echocardiogram will be obtained to evaluate her systolic heart murmur.  We will call her with results and decide on proper follow-up  plan at that point.  Currently I agree with her medical management.     It is a pleasure to assist in her care.   Let me know if you have any questions regarding her case.    Sincerely,        KENY powers/pricilla           This note was transcribed by Ana Rosa Rocha.  dmd/cbd  The above service was transcribed by Ana Rosa Rocha, and I attest to the accuracy of the note.  CBD.             Electronically Signed by: Ana Rosa Rocha-, -Author on June 5, 2020 06:07:01 AM  Electronically Co-signed by: Connor Price MD -Reviewer on June 5, 2020 10:22:16 AM

## 2021-05-13 NOTE — PROGRESS NOTES
Progress Note      Patient Name: Festus French   Patient ID: [id number]   Sex: Female   YOB: 1960    Primary Care Provider: Shin ROBLES   Referring Provider: Shin ROBLES    Visit Date: 2020    Provider: MARGARET King   Location: [facility]   Location Address: [address] [place] [address], [address] [place] [address]  [id number]   Location Phone: [phone]          Chief Complaint  · Annual Exam  · PAP exam  · (Health Maintainence Information Reviewed Under Results)      History Of Present Illness  Festus French is a 60 year old /Black female who presents for evaluation and treatment of:   No current complaints.      Last colonoscopy was almost 10 years ago.  She would like to try Cologuard.  She does not have any family history of colorectal cancer and but she did have polyps by her report that were benign on her colonoscopy 10 years ago.    She has not had a Pap smear in 5 years.  But it was normal.  She is  0.  Head of complete hysterectomy due to a ruptured ovary she had to have emergency surgery.    Does have an area that is almost a keloid that is tender and red she cannot wear very many bras and even when she wears a sports bra is uncomfortable.  States is near the scar from when she had reduction of her breast.    Has a fall on her left side of her scalp just into her hairline back from her temple that she states has been the same size for multiple years.  But just wants it checked out.  Also has a finger on her left hand that will catch however she is wanting to have the surgery and she is had injections in the did not help.       Past Medical History  Disease Name Date Onset Notes   Anxiety --  --    Deafness --  --    Heart Murmur --  --    Hypertension --  --    Screening for osteoporosis 2018 --    Screening Mammogram 2018 --    Thyroid Problems --  --          Past Surgical History  Procedure Name Date Notes    breast reduction 2014 --    Colonoscopy 05/19/2011 --    Dental Surgery 2002 --    Hysterectomy 1998 --    Neuroma excision 2000 --          Medication List  Name Date Started Instructions   amlodipine 10 mg oral tablet 09/14/2020 TAKE 1 TABLET(10 MG) BY MOUTH EVERY DAY   aspirin 81 mg oral tablet,chewable  chew 1 tablet (81 mg) by oral route once daily   B12 5,000-100 mcg sublingual lozenge  dissolve 1 lozenge by sublingual route daily   Benadryl 25 mg oral capsule  take 1 capsule (25 mg) by oral route every 6 hours as needed   bupropion HCl 300 mg oral tablet extended release 24 hr 09/07/2020 TAKE 1 TABLET(300 MG) BY MOUTH EVERY DAY   buspirone 10 mg oral tablet 09/14/2020 TAKE 1 TABLET(10 MG) BY MOUTH THREE TIMES DAILY   clonidine HCl 0.3 mg oral tablet 09/14/2020 TAKE 1 TABLET BY MOUTH EVERY DAY   cyclobenzaprine 10 mg oral tablet 09/10/2019 take 1 tablet (10 mg) by oral route 3 times per day as needed. May make sleepy   fosinopril 40 mg oral tablet 12/17/2019 TAKE 1 TABLET(40 MG) BY MOUTH EVERY DAY   hydrochlorothiazide 50 mg oral tablet 09/14/2020 TAKE 1 TABLET(50 MG) BY MOUTH EVERY DAY   levothyroxine 137 mcg oral tablet 09/24/2020 take 1 tablet (137 mcg) by oral route once daily for 90 days   meloxicam 7.5 mg oral tablet 06/01/2020 TAKE 1 TABLET(7.5 MG) BY MOUTH EVERY DAY   phentermine 30 mg oral capsule  take 1 capsule (30 mg) by oral route once daily before breakfast   potassium chloride 10 mEq oral tablet extended release 09/24/2020 take 1 tablet (10 meq) by oral route once daily with food for 90 days   Synthroid 150 mcg oral tablet 06/21/2019 take 1 tablet (150 mcg) by oral route once daily for 90 days   triamcinolone acetonide 0.1 % topical cream 04/10/2020 APPLY THIN LAYER EXTERNALLY TO THE AFFECTED AREA TWICE DAILY FOR 21 DAYS   Trokendi XR 50 mg oral capsule,extended release 24hr  take 1 capsule (50 mg) by oral route once daily   Wellbutrin  mg oral tablet extended release 24 hr 09/10/2019  "take 1 tablet (300 mg) by oral route once daily for 90 days         Allergy List  Allergen Name Date Reaction Notes   Morphine Sulfate --  --  --    PENICILLINS --  --  --          Family Medical History  Disease Name Relative/Age Notes   Heart Disease Father/  Mother/   sibling   Cancer, Unspecified Aunt/  Mother/  Sister/   breast   Diabetes Mother/   sibling         Social History  Finding Status Start/Stop Quantity Notes   Alcohol Current some day --/-- 1 per week --    Tobacco Former 51/-- 1/4ppd smoked for 10 yrs prior to quitting/es         Immunizations  NameDate Admin Mfg Trade Name Lot Number Route Inj VIS Given VIS Publication   Rgogefeas62/23/2020 [facility] Fluzone Quadrivalent [id number] IM LD 10/23/2020 08/15/2019   Comments: pt tolerated injection well         Review of Systems  · Constitutional  o Denies  o : fever, fatigue, weight loss, weight gain  · Cardiovascular  o Denies  o : lower extremity edema, claudication, chest pressure, palpitations  · Respiratory  o Denies  o : shortness of breath, wheezing, cough, hemoptysis, dyspnea on exertion  · Gastrointestinal  o Denies  o : nausea, vomiting, diarrhea, constipation, abdominal pain      Vitals  Date Time BP Position Site L\R Cuff Size HR RR TEMP (F) WT  HT  BMI kg/m2 BSA m2 O2 Sat FR L/min FiO2 HC       10/23/2020 03:10 /78 Sitting    99 - R 16 97.9 203lbs 0oz 5'  5\" 33.78 2.05 99 %  21%          Physical Examination  · Constitutional  o Appearance  o : well-nourished, in no acute distress  · Neck  o Inspection/Palpation  o : normal appearance, no masses or tenderness, trachea midline  o Thyroid  o : gland size normal, nontender, no nodules or masses present on palpation  · Respiratory  o Respiratory Effort  o : breathing unlabored  o Inspection of Chest  o : normal appearance  o Auscultation of Lungs  o : normal breath sounds throughout  · Cardiovascular  o Heart  o :   § Auscultation of Heart  § : regular rate and rhythm, no murmurs, gallops or " rubs  · Breasts  o Inspection of Breasts  o : breasts symmetrical, no skin changes, no deformities present, no discharge present  o Palpation of Breasts, Axillae  o : no masses present on palpation, no breast tenderness has an area of keloid scarring at the end of a healed scar under her left breast near her xiphoid process. Erythematous. No drainage noted.  · Gastrointestinal  o Abdominal Examination  o : abdomen nontender to palpation, tone normal without rigidity or guarding, no masses present, normal bowel sounds  · Genitourinary  o External Genitalia  o : no inflammation, no lesions present  o Vagina  o : normal vaginal vault, no discharge present, no inflammatory lesions present, no masses present  o Bladder  o : nontender to palpation  o Cervix  o : cervix not present   o Uterus  o : surgically absent  o Anus  o : no inflammation or lesions present  o Perineum  o : perineum within normal limits  · Lymphatic  o Neck  o : no lymphadenopathy present  o Axilla  o : no lymphadenopathy present  o Groin  o : no lymphadenopathy present  · Skin and Subcutaneous Tissue  o General Inspection  o : no lesions present, no areas of discoloration, skin turgor normal, texture normal mole that is uniform in color with good smooth borders to her scalp.  · Neurologic  o Mental Status Examination  o :   § Orientation  § : grossly oriented to person, place and time  o Gait and Station  o : normal gait, able to stand without difficulty  · Psychiatric  o Judgement and Insight  o : judgment and insight intact  o Mood and Affect  o : mood normal, affect appropriate          Results  · In-Office Procedures  o Lab procedure  § IOP - Fecal Occult Blood Testing (54331)   § Hemocult Stl Ql: Negative       Assessment  · Need for influenza vaccination     V04.81/Z23  · Screening for colon cancer     V76.51/Z12.11  · Routine gynecological examination     V72.31/Z01.419  · Skin lesion of  breast     611.9/N64.9      Plan  · Orders  o Immunization Admin Fee (Single) ([facility]) (59611) - V04.81/Z23 - 10/23/2020  o Fluzone Quadrivalent Vaccine, age 6 months + (94234) - V04.81/Z23 - 10/23/2020   Vaccine - Influenza; Dose: 0.5; Site: Left Deltoid; Route: Intramuscular; Date: 10/23/2020 15:18:00; Exp: 06/30/2021; Lot: [id number]; Mfg: [employer] pasteur; TradeName: Fluzone Quadrivalent; Administered By: Sudeep Flores MA; Comment: pt tolerated injection well  o COLONOSCOPY REFERRAL (COLON) - V76.51/Z12.11 - 10/23/2020  o ACO-39: Current medications updated and reviewed (1159F, ) - - 10/23/2020  o ACO-14: Influenza immunization administered or previously received [facility] () - - 10/23/2020  o Plastic Surgery Consultation (PLAST) - 611.9/N64.9 - 10/23/2020  · Medications  o Medications have been Reconciled  o Transition of Care or Provider Policy  · Instructions  o Patient was educated/instructed on their diagnosis, treatment and medications prior to discharge from the clinic today.  o Minutes spent with patient including greater than 50% in Education/Counseling/Care Coordination.  o Time spent with the patient was minutes, more than 50% face to face.  o Counseled on monthly breast self exams.   o Counseled on STD prevention.  o Counseled on diet and exercise.   o Counseled on weight-bearing exercise.  o Recommended Calcium with Vitamin D twice daily.            Electronically Signed by: MARGARET King -Author on October 23, 2020 03:47:07 PM

## 2021-05-13 NOTE — PROGRESS NOTES
Progress Note      Patient Name: Festus French   Patient ID: [id number]   Sex: Female   YOB: 1960    Primary Care Provider: Shin ROBLES    Visit Date: May 4, 2020    Provider: MARGARET King   Location: [facility]   Location Address: [address] [place] [address], [address]  [id number]   Location Phone: [phone]          Chief Complaint     The patient is here for chest pain/mid may be due to scar tissue.  Also has bilateral knee pain       History Of Present Illness  Festus French is a 60 year old /Black female who presents for evaluation and treatment of:      Has reddened around keloid on left lower breast from breast reduction.  gets better in certain position.  Leaning back makes it worse.  And leaning forward makes it worse.  However she stays upright it is better .   And she does get a sharp pain last for a few seconds and then it is gone.  She has not taken any medicine for it.  And this is been going on for the last week or so.  Denies any other chest pain shortness of air denies a cough denies reflux or heartburn.  States she has been having take more of her anxiety meds because she is so anxious about her heart.  But she did have a complete heart work-up about a year ago.  When she was on phentermine from a weight loss clinic.  And was given a okay to restart her phentermine and was told that her stress test and heart exam was normal.  She saw Dr. Cm Langley.       Past Medical History  Disease Name Date Onset Notes   Anxiety --  --    Deafness --  --    Heart Murmur --  --    Hypertension --  --    Screening for osteoporosis 11/2018 --    Screening Mammogram 11/2018 --    Thyroid Problems --  --          Past Surgical History  Procedure Name Date Notes   breast reduction 2014 --    Colonoscopy 05/19/2011 --    Dental Surgery 2002 --    Hysterectomy 1998 --    Neuroma excision 2000 --          Medication List  Name Date Started Instructions    amlodipine 10 mg oral tablet 03/17/2020 TAKE 1 TABLET(10 MG) BY MOUTH EVERY DAY   aspirin 81 mg oral tablet,chewable  chew 1 tablet (81 mg) by oral route once daily   B12 5,000-100 mcg sublingual lozenge  dissolve 1 lozenge by sublingual route daily   Benadryl 25 mg oral capsule  take 1 capsule (25 mg) by oral route every 6 hours as needed   bupropion HCl 300 mg oral tablet extended release 24 hr 03/02/2020 TAKE 1 TABLET(300 MG) BY MOUTH EVERY DAY   buspirone 10 mg oral tablet 01/03/2020 take 1 tablet (10 mg) by oral route 3 times per day for 30 days   clonidine HCl 0.3 mg oral tablet 03/17/2020 TAKE 1 TABLET BY MOUTH EVERY DAY   cyclobenzaprine 10 mg oral tablet 09/10/2019 take 1 tablet (10 mg) by oral route 3 times per day as needed. May make sleepy   fosinopril 40 mg oral tablet 12/17/2019 TAKE 1 TABLET(40 MG) BY MOUTH EVERY DAY   hydrochlorothiazide 50 mg oral tablet 03/17/2020 TAKE 1 TABLET(50 MG) BY MOUTH EVERY DAY   levothyroxine 150 mcg oral tablet 12/23/2019 TAKE 1 TABLET(150 MCG) BY MOUTH EVERY DAY   phentermine 30 mg oral capsule  take 1 capsule (30 mg) by oral route once daily before breakfast   Synthroid 150 mcg oral tablet 06/21/2019 take 1 tablet (150 mcg) by oral route once daily for 90 days   triamcinolone acetonide 0.1 % topical cream 04/10/2020 APPLY THIN LAYER EXTERNALLY TO THE AFFECTED AREA TWICE DAILY FOR 21 DAYS   Trokendi XR 50 mg oral capsule,extended release 24hr  take 1 capsule (50 mg) by oral route once daily   Wellbutrin  mg oral tablet extended release 24 hr 09/10/2019 take 1 tablet (300 mg) by oral route once daily for 90 days         Allergy List  Allergen Name Date Reaction Notes   Morphine Sulfate --  --  --    PENICILLINS --  --  --          Family Medical History  Disease Name Relative/Age Notes   Heart Disease Father/  Mother/   sibling   Cancer, Unspecified Aunt/  Mother/  Sister/   breast   Diabetes Mother/   sibling         Social History  Finding Status Start/Stop  "Quantity Notes   Alcohol Current some day --/-- 1 per week --    Tobacco Former 50/-- 1/4ppd smoked for 10 yrs prior to quitting/es         Immunizations  NameDate Admin Mfg Trade Name Lot Number Route Inj VIS Given VIS Publication   InfluenzaRefused 09/10/2019 NE Not Entered  NE NE     Comments:          Review of Systems  · Constitutional  o Denies  o : fever, fatigue, weight loss, weight gain  · Cardiovascular  o Denies  o : lower extremity edema, claudication, chest pressure, palpitations  · Respiratory  o Denies  o : shortness of breath, wheezing, cough, hemoptysis, dyspnea on exertion  · Gastrointestinal  o Denies  o : nausea, vomiting, diarrhea, constipation, abdominal pain      Vitals  Date Time BP Position Site L\R Cuff Size HR RR TEMP (F) WT  HT  BMI kg/m2 BSA m2 O2 Sat HC       05/04/2020 04:01 /72 Sitting    108 - R 16 97.5 223lbs 16oz 5'  5\" 37.28 2.16 93 %          Physical Examination  · Constitutional  o Appearance  o : well-nourished, well developed, alert, in no acute distress  · Eyes  o Conjunctivae  o : conjunctivae normal  o Sclerae  o : sclerae white  o Pupils and Irises  o : pupils equal, round, and reactive to light and accommodation bilaterally  o Corneas  o : tear film normal, no lesions present  o Eyelids/Ocular Adnexae  o : eyelid appearance normal, no exudates present, eye moisture level normal  · Respiratory  o Respiratory Effort  o : breathing unlabored  o Inspection of Chest  o : normal appearance, no retractions  o Auscultation of Lungs  o : normal breath sounds throughout  · Cardiovascular  o Heart  o :   § Auscultation of Heart  § : regular rate and rhythm without murmur, PMI normal  o Peripheral Vascular System  o :   § Extremities  § : no cyanosis, clubbing or edema; less than 2 second refill noted  · Musculoskeletal  o General  o : No joint swelling or deformity noted. Muscle tone, strength and development grossly normal.  · Skin and Subcutaneous Tissue  o General " Inspection  o : no rashes or lesions present, no areas of discoloration has a keloid noted to the left inner breast at 8 o'clock position. Does have a little bit erythema. She is very tender to palpation between her breasts. Does have quite a bit of scar tissue from previous breast reduction. And pain is reproducible on palpation to a certain extent. She states that the tenderness is the same.  · Neurologic  o Mental Status Examination  o : judgement, insight intact, modd and affect appropriate  o Motor Examination  o : strength grossly intact in all four extremities  o Gait and Station  o : normal gait, able to stand without difficulty          Assessment  · Anxiety disorder     300.00/F41.9  · Breast pain     611.71/N64.4  · Knee pain, bilateral       Pain in right knee     719.46/M25.561  Pain in left knee     719.46/M25.562  · Scar tissue     709.2/L90.5       Will start with ultrasound concerned that she may have some scar tissue that could be adhering and having adhesions.  She will take aspirin 325 mg to see if that actually helps where the 81 mg has not really been a difference.  If everything is negative and she still having the chest pain will have her revisit Dr. Cm Langley.       Plan  · Orders  o ACO-39: Current medications updated and reviewed () - - 05/04/2020  o ACO-14: Influenza immunization was not administered for reasons documented () - - 05/04/2020  o Knee (Left) 3 views X-Ray [facility] ([id number]) - 719.46/M25.562 - 05/04/2020  o Knee (Right) 3 views X-Ray [facility] ([id number]) - 719.46/M25.561 - 05/04/2020  o Breast Ultrasound Bilateral Limited [facility] ([id number]) - 611.71/N64.4 - 05/04/2020   scar tissue  · Medications  o Medications have been Reconciled  o Transition of Care or Provider Policy  · Instructions  o Patient was educated/instructed on their diagnosis, treatment and medications prior to discharge from the clinic today.  o Minutes spent with patient including  greater than 50% in Education/Counseling/Care Coordination.  o Time spent with the patient was minutes, more than 50% face to face.  · Disposition  o Call or Return if symptoms worsen or persist.  o Will call with results and further testing            Electronically Signed by: MARGARET King -Author on May 4, 2020 05:04:48 PM

## 2021-05-13 NOTE — PROGRESS NOTES
Progress Note      Patient Name: Festus French   Patient ID: [id number]   Sex: Female   YOB: 1960    Primary Care Provider: Shin ROBLES   Referring Provider: Shin ROBLES    Visit Date: July 20, 2020    Provider: MARGARET King   Location: [facility]   Location Address: [address] [place] [address], [address] [place] [address]  [id number]   Location Phone: [phone]          Chief Complaint     The patient is being seen for a six month f/u of depression, anxiety, htn, hypothyroid, arthralgia.       History Of Present Illness  Festus French is a 60 year old /Black female who presents for evaluation and treatment of:      Pretension: She states that been doing well at home when she occasionally checks them.  Denies any swelling in her feet.    Hypothyroid: She is not having any issues with fatigue.  Not feeling any different than her normal.  Last labs were back in February.    Still having some tenderness at her breastbone.  However she can reproduce it.  Has seen cardiology.    Depression/anxiety she is doing well on her medications not needing any issues.  But she is ready go back to work.  Tired of staying home to work.       Past Medical History  Disease Name Date Onset Notes   Anxiety --  --    Deafness --  --    Heart Murmur --  --    Hypertension --  --    Screening for osteoporosis 11/2018 --    Screening Mammogram 11/2018 --    Thyroid Problems --  --          Past Surgical History  Procedure Name Date Notes   breast reduction 2014 --    Colonoscopy 05/19/2011 --    Dental Surgery 2002 --    Hysterectomy 1998 --    Neuroma excision 2000 --          Medication List  Name Date Started Instructions   amlodipine 10 mg oral tablet 03/17/2020 TAKE 1 TABLET(10 MG) BY MOUTH EVERY DAY   aspirin 81 mg oral tablet,chewable  chew 1 tablet (81 mg) by oral route once daily   B12 5,000-100 mcg sublingual lozenge  dissolve 1 lozenge by sublingual route daily   Benadryl 25  mg oral capsule  take 1 capsule (25 mg) by oral route every 6 hours as needed   bupropion HCl 300 mg oral tablet extended release 24 hr 03/02/2020 TAKE 1 TABLET(300 MG) BY MOUTH EVERY DAY   buspirone 10 mg oral tablet 07/17/2020 TAKE 1 TABLET(10 MG) BY MOUTH THREE TIMES DAILY   clonidine HCl 0.3 mg oral tablet 03/17/2020 TAKE 1 TABLET BY MOUTH EVERY DAY   cyclobenzaprine 10 mg oral tablet 09/10/2019 take 1 tablet (10 mg) by oral route 3 times per day as needed. May make sleepy   fosinopril 40 mg oral tablet 12/17/2019 TAKE 1 TABLET(40 MG) BY MOUTH EVERY DAY   hydrochlorothiazide 50 mg oral tablet 03/17/2020 TAKE 1 TABLET(50 MG) BY MOUTH EVERY DAY   levothyroxine 150 mcg oral tablet 07/17/2020 TAKE 1 TABLET(150 MCG) BY MOUTH EVERY DAY   meloxicam 7.5 mg oral tablet 06/01/2020 TAKE 1 TABLET(7.5 MG) BY MOUTH EVERY DAY   phentermine 30 mg oral capsule  take 1 capsule (30 mg) by oral route once daily before breakfast   Synthroid 150 mcg oral tablet 06/21/2019 take 1 tablet (150 mcg) by oral route once daily for 90 days   triamcinolone acetonide 0.1 % topical cream 04/10/2020 APPLY THIN LAYER EXTERNALLY TO THE AFFECTED AREA TWICE DAILY FOR 21 DAYS   Trokendi XR 50 mg oral capsule,extended release 24hr  take 1 capsule (50 mg) by oral route once daily   Wellbutrin  mg oral tablet extended release 24 hr 09/10/2019 take 1 tablet (300 mg) by oral route once daily for 90 days         Allergy List  Allergen Name Date Reaction Notes   Morphine Sulfate --  --  --    PENICILLINS --  --  --          Family Medical History  Disease Name Relative/Age Notes   Heart Disease Father/  Mother/   sibling   Cancer, Unspecified Aunt/  Mother/  Sister/   breast   Diabetes Mother/   sibling         Social History  Finding Status Start/Stop Quantity Notes   Alcohol Current some day --/-- 1 per week --    Tobacco Former 51/-- 1/4ppd smoked for 10 yrs prior to quitting/es         Immunizations  NameDate Admin Mfg Trade Name Lot Number Route  Inj VIS Given VIS Publication   InfluenzaRefused 09/10/2019 NE Not Entered  NE NE     Comments:          Review of Systems  · Constitutional  o Denies  o : fever, fatigue, weight loss, weight gain  · Cardiovascular  o Denies  o : lower extremity edema, claudication, chest pressure, palpitations  · Respiratory  o Denies  o : shortness of breath, wheezing, cough, hemoptysis, dyspnea on exertion  · Gastrointestinal  o Denies  o : nausea, vomiting, diarrhea, constipation, abdominal pain      Physical Examination  · Constitutional  o Appearance  o : well-nourished, well developed, alert, in no acute distress  · Eyes  o Conjunctivae  o : conjunctivae normal  o Sclerae  o : sclerae white  o Pupils and Irises  o : pupils equal, round, and reactive to light and accommodation bilaterally  o Eyelids/Ocular Adnexae  o : eyelid appearance normal, no exudates present, eye moisture level normal  · Respiratory  o Respiratory Effort  o : breathing unlabored  o Inspection of Chest  o : normal appearance, no retractions  · Skin and Subcutaneous Tissue  o General Inspection  o : no rashes or lesions present, no areas of discoloration to the face.  · Neurologic  o Mental Status Examination  o : judgement, insight intact, modd and affect appropriate  o Motor Examination  o : strength grossly intact in upper extremities          Assessment  · Screening for depression     V79.0/Z13.89  · Anxiety disorder     300.00/F41.9  · Essential hypertension     401.9/I10  · Hypothyroidism     244.9/E03.9  · Major depressive disorder     296.20/F32.2      Plan  · Orders  o Annual depression screening, 15 minutes (, 74233) - V79.0/Z13.89 - 07/20/2020  o ACO-18: Positive screen for clinical depression using a standardized tool and a follow-up plan documented () - V79.0/Z13.89 - 07/20/2020   Feeling 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  o HTN/Lipid Panel (CMP, Lipid) [facility] (56290, 99819) - 401.9/I10 - 01/20/2021  o CBC with Auto Diff  [facility] (06399) - 401.9/I10 - 01/20/2021  o Urinalysis with Reflex Microscopy if abnormal ([facility]) (32454) - 401.9/I10 - 01/20/2021  o Thyroid Profile (93825, 31880, THYII) - 244.9/E03.9 - 01/20/2021  o CMP [facility] (99770) - 401.9/I10 - 09/20/2020  o Lipid Panel [facility] (31231) - 401.9/I10 - 09/20/2020  o Thyroid Profile (88344, 18229, THYII) - 244.9/E03.9 - 09/20/2020  o ACO-39: Current medications updated and reviewed () - - 07/20/2020  o ACO-14: Influenza immunization administered or previously received () - - 07/20/2020  · Medications  o Medications have been Reconciled  o Transition of Care or Provider Policy  · Instructions  o Depression Screen completed and scanned into the EMR under the designated folder within the patient's documents.  o Today's PHQ-9 result is 5___  o The provider screening met the required time of 15 minutes.  o Patient was educated/instructed on their diagnosis, treatment and medications prior to discharge from the clinic today.  o Minutes spent with patient including greater than 50% in Education/Counseling/Care Coordination.  o Time spent with the patient was minutes, more than 50% face to face.  · Disposition  o Return in 6 months            Electronically Signed by: MARGARET King -Author on July 20, 2020 09:13:26 AM

## 2021-05-13 NOTE — PROGRESS NOTES
Progress Note      Patient Name: Festus French   Patient ID: [id number]   Sex: Female   YOB: 1960    Primary Care Provider: Shin ROBLES   Referring Provider: Shin ROBLES    Visit Date: December 11, 2020    Provider: MARGARET Ronquillo   Location: [facility]   Location Address: [address] [place] [address]  [id number]   Location Phone: [phone]          History Of Present Illness  Festus French is a 60 year old /Black female who presents to the office today as a consult from Shin ROBLES.      Keloid on left part of chest incision from previous breast reduction in 2014 at [facility] in [place] with (Dr. Bennett Ulrich). Dr. Ulrich states would go away but it has gotten bigger and is tender to touch.  History of keloid with hysterectomy in 2006 but resolved.  Has a lump in the middle of her chest just slightly to right of sternum and is not sure what it is. It has become firmer over time since breast reduction. Dr. Ulrich said area was a fat pocket and inject something possibly a steroid into the fatty pocket but it didn't improve and feels tender and firm now. PMH includes hypertension but is controlled with medication, hypothyroidism is controlled with medication. History of benign heart murmur (no follow up recommended), Was  a former smoker and quit in 2016. Mammogram just a few months ago and was benign.      recommend ordering ultrasound of lump in the middle of her chest and doing in office excision of keloid scar and steroid injection.    Here today for scar revision and possible steroid injection. US of chest negative. Applied numbing cream prior to visit.       Past Medical History  Allergies; Anemia; Anxiety; Arthritis; Deafness; Heart Murmur; Hypertension; Keloid; Night sweats; Screening for osteoporosis; Screening Mammogram; Thyroid Problems         Past Surgical History  breast reduction; Colonoscopy;  Dental Surgery; Hysterectomy; Neuroma excision         Medication List  amlodipine 10 mg oral tablet; aspirin 81 mg oral tablet,chewable; B12 5,000-100 mcg sublingual lozenge; Benadryl 25 mg oral capsule; bupropion HCl 300 mg oral tablet extended release 24 hr; buspirone 10 mg oral tablet; clonidine HCl 0.3 mg oral tablet; cyclobenzaprine 10 mg oral tablet; fosinopril 40 mg oral tablet; hydrochlorothiazide 50 mg oral tablet; levothyroxine 137 mcg oral tablet; meloxicam 7.5 mg oral tablet; phentermine 30 mg oral capsule; potassium chloride 10 mEq oral tablet extended release; Synthroid 150 mcg oral tablet; triamcinolone acetonide 0.1 % topical cream; Trokendi XR 50 mg oral capsule,extended release 24hr; Wellbutrin  mg oral tablet extended release 24 hr         Allergy List  Morphine Sulfate; PENICILLINS       Allergies Reconciled  Family Medical History  Heart Disease; Cancer, Unspecified; Diabetes         Social History  Alcohol (Current some day); Tobacco (Former)         Immunizations  Name Date Admin   Influenza 10/23/2020   Influenza Refused         Vitals  Date Time BP Position Site L\R Cuff Size HR RR TEMP (F) WT  HT  BMI kg/m2 BSA m2 O2 Sat FR L/min FiO2 HC       12/11/2020 01:25 /84 Sitting    102 - R  98     98 %  21%          Physical Examination  · Respiratory  o Respiratory Effort  o : breathing unlabored   · Cardiovascular  o Heart  o : regular rate     Left breast medial incision keloid measuring 2cm x 2 cm, moderately tender, no open areas, well healed scars with some widening; Right chest elevated soft mass just adjacent to sternum about 2 cm below medial right breast  horizontal breast incision, firmer 1.5 cm area in center, mobile, mild tenderness               Assessment  · Keloid     701.4/L91.0  · Chest mass     786.6/R22.2      Plan  · Orders  o Plastics reconstructive visit ([facility]REC) - - 12/11/2020  o Administration of triamcinolone acetonide 10mg by injection () - -  12/11/2020  o Revision of scar (51264) - - 12/11/2020  o Injection of scar tissue (77736) - - 12/11/2020  · Medications  o Medications have been Reconciled  o Transition of Care or Provider Policy  · Instructions  o Consent obtained. Local injected to site, Lidocaine 1% with epi. Left breast lesion prepped with chloraprep in sterile fashion. Site draped in sterile fashion. I dissected down through skin and subcutaneous tissue completely around keloid , after excision of keloid, sent from field for pathology. Established hemostasis with direct pressure. Site was thoroughly irrigated. Site closed with 3-0 monocryl in a subcutaneous fashion to obliterate dead space, then with 4-0 monocryl in an running intracuticular fasion. Site cleaned with sterile normal saline. Kenalog injected around surgical site. Mastisol and steri-strips applied. The patient tolerated the procedure well with no immediate complications.   o Keep area clean and dry. Wear a bra and limit tension of breast. Do not extend arms. Do not get we x 48 hours. RTC 2 weeks            Electronically Signed by: MARGARET Ronquillo -Author on December 11, 2020 03:11:29 PM

## 2021-05-14 VITALS
OXYGEN SATURATION: 99 % | SYSTOLIC BLOOD PRESSURE: 140 MMHG | DIASTOLIC BLOOD PRESSURE: 78 MMHG | BODY MASS INDEX: 33.82 KG/M2 | TEMPERATURE: 97.9 F | RESPIRATION RATE: 16 BRPM | HEART RATE: 99 BPM | WEIGHT: 203 LBS | HEIGHT: 65 IN

## 2021-05-14 VITALS
TEMPERATURE: 97.8 F | BODY MASS INDEX: 33.32 KG/M2 | SYSTOLIC BLOOD PRESSURE: 130 MMHG | OXYGEN SATURATION: 98 % | RESPIRATION RATE: 16 BRPM | HEIGHT: 65 IN | HEART RATE: 83 BPM | DIASTOLIC BLOOD PRESSURE: 80 MMHG | WEIGHT: 200 LBS

## 2021-05-14 VITALS
OXYGEN SATURATION: 97 % | TEMPERATURE: 98.1 F | HEART RATE: 85 BPM | DIASTOLIC BLOOD PRESSURE: 85 MMHG | SYSTOLIC BLOOD PRESSURE: 125 MMHG

## 2021-05-14 VITALS
HEIGHT: 65 IN | DIASTOLIC BLOOD PRESSURE: 78 MMHG | TEMPERATURE: 97.7 F | BODY MASS INDEX: 33.66 KG/M2 | RESPIRATION RATE: 16 BRPM | HEART RATE: 79 BPM | WEIGHT: 202 LBS | SYSTOLIC BLOOD PRESSURE: 128 MMHG | OXYGEN SATURATION: 93 %

## 2021-05-14 VITALS — WEIGHT: 201.25 LBS | BODY MASS INDEX: 32.34 KG/M2 | HEIGHT: 66 IN | RESPIRATION RATE: 16 BRPM

## 2021-05-14 VITALS
HEART RATE: 74 BPM | SYSTOLIC BLOOD PRESSURE: 120 MMHG | OXYGEN SATURATION: 99 % | DIASTOLIC BLOOD PRESSURE: 84 MMHG | TEMPERATURE: 97.8 F

## 2021-05-14 VITALS
OXYGEN SATURATION: 95 % | SYSTOLIC BLOOD PRESSURE: 127 MMHG | DIASTOLIC BLOOD PRESSURE: 85 MMHG | TEMPERATURE: 98.2 F | HEART RATE: 66 BPM

## 2021-05-14 VITALS
HEART RATE: 102 BPM | TEMPERATURE: 98 F | SYSTOLIC BLOOD PRESSURE: 127 MMHG | DIASTOLIC BLOOD PRESSURE: 84 MMHG | OXYGEN SATURATION: 98 %

## 2021-05-14 VITALS — WEIGHT: 202 LBS | BODY MASS INDEX: 33.66 KG/M2 | HEIGHT: 65 IN | RESPIRATION RATE: 14 BRPM

## 2021-05-14 NOTE — PROGRESS NOTES
Progress Note      Patient Name: Festus French   Patient ID: [id number]   Sex: Female   YOB: 1960    Primary Care Provider: Shin ROBLES   Referring Provider: Shin ROBLES    Visit Date: December 28, 2020    Provider: MARGARET Ronquillo   Location: [facility]   Location Address: [address] [place] [address]  [id number]   Location Phone: [phone]          History Of Present Illness  Festus French is a 60 year old /Black female who presents to the office today as a consult from Shin ROBLES.      Keloid on left part of chest incision from previous breast reduction in 2014 at [facility] in [place] with (Dr. Bennett Ulrich). Dr. Ulrich states would go away but it has gotten bigger and is tender to touch.  History of keloid with hysterectomy in 2006 but resolved.  Has a lump in the middle of her chest just slightly to right of sternum and is not sure what it is. It has become firmer over time since breast reduction. Dr. Ulrich said area was a fat pocket and inject something possibly a steroid into the fatty pocket but it didn't improve and feels tender and firm now. PMH includes hypertension but is controlled with medication, hypothyroidism is controlled with medication. History of benign heart murmur (no follow up recommended), Was  a former smoker and quit in 2016. Mammogram just a few months ago and was benign.      S/p excision of left chest mass 12/14/20. Patient is doing well, no concerns. Here today for steri strip removal. Path showed keloid           Past Medical History  Allergies; Anemia; Anxiety; Arthritis; Deafness; Heart Murmur; Hypertension; Keloid; Night sweats; Screening for osteoporosis; Screening Mammogram; Thyroid Problems         Past Surgical History  breast reduction; Colonoscopy; Dental Surgery; Hysterectomy; Neuroma excision         Medication List  amlodipine 10 mg oral tablet; aspirin 81 mg oral  tablet,chewable; B12 5,000-100 mcg sublingual lozenge; Benadryl 25 mg oral capsule; bupropion HCl 300 mg oral tablet extended release 24 hr; buspirone 10 mg oral tablet; clonidine HCl 0.3 mg oral tablet; cyclobenzaprine 10 mg oral tablet; fosinopril 40 mg oral tablet; hydrochlorothiazide 50 mg oral tablet; levothyroxine 137 mcg oral tablet; meloxicam 7.5 mg oral tablet; phentermine 30 mg oral capsule; potassium chloride 10 mEq oral tablet extended release; Synthroid 150 mcg oral tablet; triamcinolone acetonide 0.1 % topical cream; Trokendi XR 50 mg oral capsule,extended release 24hr; Wellbutrin  mg oral tablet extended release 24 hr         Allergy List  Morphine Sulfate; PENICILLINS         Family Medical History  Heart Disease; Cancer, Unspecified; Diabetes         Social History  Alcohol (Current some day); Tobacco (Former)         Immunizations  Name Date Admin   Influenza 10/23/2020   Influenza Refused         Vitals  Date Time BP Position Site L\R Cuff Size HR RR TEMP (F) WT  HT  BMI kg/m2 BSA m2 O2 Sat FR L/min FiO2 HC       12/28/2020 09:20 /84 Sitting    74 - R  97.8     99 %  21%          Physical Examination  · Respiratory  o Respiratory Effort  o : breathing unlabored   · Cardiovascular  o Heart  o : regular rate     incision healing well, no redness, no open areas, no tenderness           Assessment  · Keloid     701.4/L91.0  · Chest mass     786.6/R22.2      Plan  · Orders  o Plastics reconstructive visit (PSREC) - - 12/28/2020  · Medications  o Medications have been Reconciled  o Transition of Care or Provider Policy  · Instructions  o Aquaphor and scar massage, no direct sun exposure, monitor site for keloiding and call to come in if needed, RTC 3 months for scar check            Electronically Signed by: MARGARET Ronquillo -Author on December 28, 2020 09:33:51 AM

## 2021-05-14 NOTE — PROGRESS NOTES
Progress Note      Patient Name: Festus French   Patient ID: [id number]   Sex: Female   YOB: 1960    Primary Care Provider: Shin ROBLES   Referring Provider: Shin ROBLES    Visit Date: January 29, 2021    Provider: Jonathan Swain MD   Location: [facility]   Location Address: [address] [place] [address]  [id number]   Location Phone: [phone]          Chief Complaint  · Follow Up Surgery      History Of Present Illness     Ms. French is seen in follow-up status post colonoscopy. She was found to have diverticulosis. I have instructed her as to a high fiber diet.       Past Medical History  Allergies; Anemia; Anxiety; Arthritis; Deafness; Heart Murmur; Hypertension; Keloid; Night sweats; Screening for osteoporosis; Screening Mammogram; Thyroid Problems         Past Surgical History  breast reduction; Colonoscopy; Dental Surgery; Hysterectomy; Neuroma excision         Medication List  amlodipine 10 mg oral tablet; aspirin 81 mg oral tablet,chewable; B12 5,000-100 mcg sublingual lozenge; bupropion HCl 300 mg oral tablet extended release 24 hr; buspirone 10 mg oral tablet; clonidine HCl 0.3 mg oral tablet; fosinopril 40 mg oral tablet; hydrochlorothiazide 50 mg oral tablet; meloxicam 7.5 mg oral tablet; potassium chloride 10 mEq oral tablet extended release; Synthroid 150 mcg oral tablet; triamcinolone acetonide 0.1 % topical cream; Trokendi XR 50 mg oral capsule,extended release 24hr; Wellbutrin  mg oral tablet extended release 24 hr         Allergy List  Morphine Sulfate; PENICILLINS         Family Medical History  Heart Disease; Cancer, Unspecified; Diabetes         Social History  Alcohol (Current some day); Tobacco (Former)         Immunizations  Name Date Admin   Influenza 10/23/2020   Influenza Refused         Review of Systems  · Cardiovascular  o Denies  o : chest pain on exertion, shortness of breath, lower extremity  "swelling  · Respiratory  o Denies  o : wheezing, chronic cough, coughing up blood  · Gastrointestinal  o Denies  o : diarrhea, chronic abdominal pain, reflux symptoms      Vitals  Date Time BP Position Site L\R Cuff Size HR RR TEMP (F) WT  HT  BMI kg/m2 BSA m2 O2 Sat FR L/min FiO2 HC       01/29/2021 02:29 PM       14  202lbs 0oz 5'  5\" 33.61 2.05                 Assessment  · Encounter for examination following surgery     V67.00/Z09      Plan  · Medications  o Medications have been Reconciled  o Transition of Care or Provider Policy  · Instructions  o I would recommend a follow-up colonoscopy in five years.   o Electronically Identified Patient Education Materials Provided Electronically            Electronically Signed by: Harriet Robledo-, -Author on February 1, 2021 01:06:12 PM  Electronically Co-signed by: Jonathan Swain MD -Reviewer on February 4, 2021 01:21:30 PM  "

## 2021-05-14 NOTE — PROGRESS NOTES
Progress Note      Patient Name: Festus French   Patient ID: [id number]   Sex: Female   YOB: 1960    Primary Care Provider: Shin ROBLES   Referring Provider: Shin ROBLES    Visit Date: March 10, 2021    Provider: MARGARET Ronquillo   Location: [facility]   Location Address: [address] [place] [address]  [id number]   Location Phone: [phone]          History Of Present Illness  Festus French is a 61 year old /Black female who presents to the office today as a consult from Shin ROBLES.      Keloid on left part of chest incision from previous breast reduction in 2014 at [facility] in [place] with (Dr. Bennett Ulrich). Dr. Ulrich states would go away but it has gotten bigger and is tender to touch.  History of keloid with hysterectomy in 2006 but resolved.  Has a lump in the middle of her chest just slightly to right of sternum and is not sure what it is. It has become firmer over time since breast reduction. Dr. Ulrich said area was a fat pocket and inject something possibly a steroid into the fatty pocket but it didn't improve and feels tender and firm now. PMH includes hypertension but is controlled with medication, hypothyroidism is controlled with medication. History of benign heart murmur (no follow up recommended), Was  a former smoker and quit in 2016. Mammogram just a few months ago and was benign.      S/p excision of left chest mass 12/14/20. Patient is doing well, no concerns. Path showed keloid. Here today for follow up.           Past Medical History  Allergies; Anemia; Anxiety; Arthritis; Deafness; Heart Murmur; Hypertension; Keloid; Night sweats; Screening for osteoporosis; Screening Mammogram; Thyroid Problems         Past Surgical History  breast reduction; Colonoscopy; Dental Surgery; Hysterectomy; Neuroma excision         Medication List  amlodipine 10 mg oral tablet; aspirin 81 mg oral  tablet,chewable; B12 5,000-100 mcg sublingual lozenge; bupropion HCl 300 mg oral tablet extended release 24 hr; buspirone 10 mg oral tablet; clonidine HCl 0.3 mg oral tablet; fosinopril 40 mg oral tablet; hydrochlorothiazide 50 mg oral tablet; meloxicam 7.5 mg oral tablet; potassium chloride 10 mEq oral tablet extended release; Synthroid 150 mcg oral tablet; triamcinolone acetonide 0.1 % topical cream; Trokendi XR 50 mg oral capsule,extended release 24hr; Wellbutrin  mg oral tablet extended release 24 hr         Allergy List  Morphine Sulfate; PENICILLINS         Family Medical History  Heart Disease; Cancer, Unspecified; Diabetes         Social History  Alcohol (Current some day); Tobacco (Former)         Immunizations  Name Date Admin   Influenza 10/23/2020   Influenza Refused         Vitals  Date Time BP Position Site L\R Cuff Size HR RR TEMP (F) WT  HT  BMI kg/m2 BSA m2 O2 Sat FR L/min FiO2 HC       03/10/2021 04:46 /85 Sitting    66 - R  98.2     95 %  21%          Physical Examination  · Respiratory  o Respiratory Effort  o : breathing unlabored   · Cardiovascular  o Heart  o : regular rate     incision healing well, no redness, no open areas, slight thickening noted but not tender           Assessment  · Keloid     701.4/L91.0  · Chest mass     786.6/R22.2      Plan  · Orders  o Plastics reconstructive visit (PSREC) - - 03/10/2021  · Medications  o Medications have been Reconciled  o Transition of Care or Provider Policy  · Instructions  o Aquaphor and scar massage, no direct sun exposure  o Discussed possible steroid injection again but patient wants to wait and see if gets worse or becomes tender. RTC 3 months but call or come in for any signs of keloiding             Electronically Signed by: MARGARET Ronquillo -Author on March 10, 2021 05:36:27 PM

## 2021-05-14 NOTE — PROGRESS NOTES
Progress Note      Patient Name: Festus French   Patient ID: [id number]   Sex: Female   YOB: 1960    Primary Care Provider: Shin ROBLES   Referring Provider: Shin ROBLES    Visit Date: April 23, 2021    Provider: MARGARET King   Location: [facility]   Location Address: [address] [place] [address], [address] [place] [address]  [id number]   Location Phone: [phone]          Chief Complaint     The patient is here for a f/u of htn, anxiety, depression, hypothyroid, migraines. She feels like her ears are plugged.       History Of Present Illness  Festus French is a 61 year old /Black female who presents for evaluation and treatment of:      Patient. feels like her ears are full.  It only in the evenings.  And if she just pops her ears is gone it does not return.  Denies having any allergies.    Blood pressure is good 130/80.    Reviewed all of her labs.  And she sees hematology for her low white cell count.         Past Medical History  Disease Name Date Onset Notes   Allergies --  --    Anemia --  --    Anxiety --  --    Arthritis --  --    Deafness --  --    Heart Murmur --  --    Hypertension --  --    Keloid --  --    Night sweats --  --    Screening for osteoporosis 11/2018 --    Screening Mammogram 11/2018 --    Thyroid Problems --  --          Past Surgical History  Procedure Name Date Notes   breast reduction 2012 --    Colonoscopy 05/19/2011 --    Dental Surgery 2002 --    Hysterectomy 1998 --    Neuroma excision 2000 --          Medication List  Name Date Started Instructions   amlodipine 10 mg oral tablet 09/14/2020 TAKE 1 TABLET(10 MG) BY MOUTH EVERY DAY   aspirin 81 mg oral tablet,chewable  chew 1 tablet (81 mg) by oral route once daily   B12 5,000-100 mcg sublingual lozenge  dissolve 1 lozenge by sublingual route daily   bupropion HCl 300 mg oral tablet extended release 24 hr 09/07/2020 TAKE 1 TABLET(300 MG) BY MOUTH EVERY DAY    buspirone 10 mg oral tablet 09/14/2020 TAKE 1 TABLET(10 MG) BY MOUTH THREE TIMES DAILY   clonidine HCl 0.3 mg oral tablet 09/14/2020 TAKE 1 TABLET BY MOUTH EVERY DAY   fosinopril 40 mg oral tablet 12/17/2019 TAKE 1 TABLET(40 MG) BY MOUTH EVERY DAY   hydrochlorothiazide 50 mg oral tablet 09/14/2020 TAKE 1 TABLET(50 MG) BY MOUTH EVERY DAY   potassium chloride 10 mEq oral tablet extended release 01/05/2021 take 1 tablet (10 meq) by oral route once daily with food for 90 days   Synthroid 150 mcg oral tablet 01/08/2021 take 1 tablet (150 mcg) by oral route once daily for 90 days   triamcinolone acetonide 0.1 % topical cream 04/10/2020 APPLY THIN LAYER EXTERNALLY TO THE AFFECTED AREA TWICE DAILY FOR 21 DAYS   Trokendi XR 50 mg oral capsule,extended release 24hr  take 1 capsule (50 mg) by oral route once daily         Allergy List  Allergen Name Date Reaction Notes   Morphine Sulfate --  --  --    PENICILLINS --  --  --          Family Medical History  Disease Name Relative/Age Notes   Heart Disease Father/  Mother/   sibling   Cancer, Unspecified Aunt/  Mother/  Sister/   breast   Diabetes Mother/   sibling         Social History  Finding Status Start/Stop Quantity Notes   Alcohol Current some day --/-- 1 per week --    Tobacco Former 51/-- 1/4ppd smoked for 10 yrs prior to quitting; quit in 2016         Immunizations  NameDate Admin Mfg Trade Name Lot Number Route Inj VIS Given VIS Publication   COVID Iehhbae8703/05/2021 MOD Moderna COVID-19 Vaccine  NE NE 04/23/2021    Comments:    COVID Hhkxtlf1602/05/2021 MOD Moderna COVID-19 Vaccine  NE NE 04/23/2021    Comments:    Bqiwoduwt81/23/2020 [facility] Fluzone Quadrivalent [id number] IM LD 10/23/2020 08/15/2019   Comments: pt tolerated injection well         Review of Systems  · Constitutional  o Denies  o : fever, fatigue, weight loss, weight gain  · HENT  o Admits  o : ear fullness  · Cardiovascular  o Denies  o : lower extremity edema, claudication, chest pressure,  "palpitations  · Respiratory  o Denies  o : shortness of breath, wheezing, cough, hemoptysis, dyspnea on exertion  · Gastrointestinal  o Denies  o : nausea, vomiting, diarrhea, constipation, abdominal pain      Vitals  Date Time BP Position Site L\R Cuff Size HR RR TEMP (F) WT  HT  BMI kg/m2 BSA m2 O2 Sat FR L/min FiO2 HC       04/23/2021 03:15 /80 Sitting    83 - R 16 97.8 200lbs 0oz 5'  5\" 33.28 2.04 98 %  21%          Physical Examination  · Constitutional  o Appearance  o : well-nourished, well developed, alert, in no acute distress  · Eyes  o Conjunctivae  o : conjunctivae normal  o Sclerae  o : sclerae white  o Pupils and Irises  o : pupils equal, round, and reactive to light and accommodation bilaterally  o Corneas  o : tear film normal, no lesions present  o Eyelids/Ocular Adnexae  o : eyelid appearance normal, no exudates present, eye moisture level normal  · Ears, Nose, Mouth and Throat  o Ears  o : external ear auricle normal, otic canal normal, TM with no reddness, effusion, retraction  o Nose  o : external normal, nasal mucosa normal, turbinates normal  o Oral Cavity  o : tongue no lesion, oral mucosa normal  o Throat  o : no erythemia, exudate or lesions  · Neck  o Inspection/Palpation  o : normal appearance, no masses or tenderness, trachea midline, no enlarged cervical or supraclavicular lymphnodes palpated  o Thyroid  o : gland size normal, nontender, no nodules or masses present on palpation, thyroid motion normal during swallowing  · Respiratory  o Respiratory Effort  o : breathing unlabored  o Inspection of Chest  o : normal appearance, no retractions  o Auscultation of Lungs  o : normal breath sounds throughout  · Cardiovascular  o Heart  o :   § Auscultation of Heart  § : regular rate and rhythm without murmur, PMI normal  o Peripheral Vascular System  o :   § Extremities  § : no cyanosis, clubbing or edema; less than 2 second refill noted  · Musculoskeletal  o General  o : No joint " swelling or deformity noted. Muscle tone, strength and development grossly normal.  · Skin and Subcutaneous Tissue  o General Inspection  o : no rashes or lesions present, no areas of discoloration  · Neurologic  o Mental Status Examination  o : judgement, insight intact, modd and affect appropriate  o Motor Examination  o : strength grossly intact in all four extremities  o Gait and Station  o : normal gait, able to stand without difficulty          Assessment  · Essential hypertension     401.9/I10  · Leukopenia     288.50/D72.819  · Ear fullness, bilateral     388.8/H93.8X3  Since only occurring once a day. She just can continue to pop her years. Since she is not having any other issues.      Plan  · Orders  o ACO-14: Influenza immunization administered or previously received [facility] () - - 04/23/2021  o ACO-39: Current medications updated and reviewed (, 1159F) - - 04/23/2021  · Medications  o Medications have been Reconciled  o Transition of Care or Provider Policy  · Instructions  o Patient was educated/instructed on their diagnosis, treatment and medications prior to discharge from the clinic today.  o Minutes spent with patient including greater than 50% in Education/Counseling/Care Coordination.  o Time spent with the patient was minutes, more than 50% face to face.  · Disposition  o Return in 6 months            Electronically Signed by: MARGARET King -Author on April 23, 2021 04:25:45 PM

## 2021-05-14 NOTE — PROGRESS NOTES
Progress Note      Patient Name: Festus French   Patient ID: [id number]   Sex: Female   YOB: 1960    Primary Care Provider: Shin ROBLES   Referring Provider: Shin ROBLES    Visit Date: January 8, 2021    Provider: MARGARET King   Location: [facility]   Location Address: [address] [place] [address], [address] [place] [address]  [id number]   Location Phone: [phone]          Chief Complaint     The patient is here for a six month f/u of depression, hypothyroid, low potassium, htn, anxiety.       History Of Present Illness  Festus French is a 60 year old /Black female who presents for evaluation and treatment of:      Hypertension: Patient is doing well on her medications pressure is 128/78 today.  Reviewed her labs today.  Finished her potassium back in the fall.  And she is still has hypokalemia.      Hypothyroidism: Patient is doing well however we adjust her medication late last year.  And it is now causing her to have more hypothyroidism.      Anxiety: Patient doing well on her bupropion or BuSpar.    The keloid that she had on her chest has been fixed.  The scar tissue was removed she states.  And she is no longer having any pain in her chest.       Past Medical History  Disease Name Date Onset Notes   Allergies --  --    Anemia --  --    Anxiety --  --    Arthritis --  --    Deafness --  --    Heart Murmur --  --    Hypertension --  --    Keloid --  --    Night sweats --  --    Screening for osteoporosis 11/2018 --    Screening Mammogram 11/2018 --    Thyroid Problems --  --          Past Surgical History  Procedure Name Date Notes   breast reduction 2012 --    Colonoscopy 05/19/2011 --    Dental Surgery 2002 --    Hysterectomy 1998 --    Neuroma excision 2000 --          Medication List  Name Date Started Instructions   amlodipine 10 mg oral tablet 09/14/2020 TAKE 1 TABLET(10 MG) BY MOUTH EVERY DAY   aspirin 81 mg oral tablet,chewable   chew 1 tablet (81 mg) by oral route once daily   B12 5,000-100 mcg sublingual lozenge  dissolve 1 lozenge by sublingual route daily   bupropion HCl 300 mg oral tablet extended release 24 hr 09/07/2020 TAKE 1 TABLET(300 MG) BY MOUTH EVERY DAY   buspirone 10 mg oral tablet 09/14/2020 TAKE 1 TABLET(10 MG) BY MOUTH THREE TIMES DAILY   clonidine HCl 0.3 mg oral tablet 09/14/2020 TAKE 1 TABLET BY MOUTH EVERY DAY   fosinopril 40 mg oral tablet 12/17/2019 TAKE 1 TABLET(40 MG) BY MOUTH EVERY DAY   hydrochlorothiazide 50 mg oral tablet 09/14/2020 TAKE 1 TABLET(50 MG) BY MOUTH EVERY DAY   levothyroxine 137 mcg oral tablet 09/24/2020 take 1 tablet (137 mcg) by oral route once daily for 90 days   LEVOTHYROXINE 0.150MG (150MCG) TAB 01/04/2021 TAKE 1 TABLET(150 MCG) BY MOUTH EVERY DAY   meloxicam 7.5 mg oral tablet 06/01/2020 TAKE 1 TABLET(7.5 MG) BY MOUTH EVERY DAY   potassium chloride 10 mEq oral tablet extended release 01/05/2021 take 1 tablet (10 meq) by oral route once daily with food for 90 days   Synthroid 150 mcg oral tablet 06/21/2019 take 1 tablet (150 mcg) by oral route once daily for 90 days   triamcinolone acetonide 0.1 % topical cream 04/10/2020 APPLY THIN LAYER EXTERNALLY TO THE AFFECTED AREA TWICE DAILY FOR 21 DAYS   Trokendi XR 50 mg oral capsule,extended release 24hr  take 1 capsule (50 mg) by oral route once daily   Wellbutrin  mg oral tablet extended release 24 hr 09/10/2019 take 1 tablet (300 mg) by oral route once daily for 90 days         Allergy List  Allergen Name Date Reaction Notes   Morphine Sulfate --  --  --    PENICILLINS --  --  --          Family Medical History  Disease Name Relative/Age Notes   Heart Disease Father/  Mother/   sibling   Cancer, Unspecified Aunt/  Mother/  Sister/   breast   Diabetes Mother/   sibling         Social History  Finding Status Start/Stop Quantity Notes   Alcohol Current some day --/-- 1 per week --    Tobacco Former 50/-- 1/4ppd smoked for 10 yrs prior to  "quitting; quit in 2016         Immunizations  NameDate Admin Mfg Trade Name Lot Number Route Inj VIS Given VIS Publication   Zeknmlniy58/23/2020 [facility] Fluzone Quadrivalent [id number] IM LD 10/23/2020 08/15/2019   Comments: pt tolerated injection well         Review of Systems  · Constitutional  o Denies  o : fever, fatigue, weight loss, weight gain  · Cardiovascular  o Denies  o : lower extremity edema, claudication, chest pressure, palpitations  · Respiratory  o Denies  o : shortness of breath, wheezing, cough, hemoptysis, dyspnea on exertion  · Gastrointestinal  o Denies  o : nausea, vomiting, diarrhea, constipation, abdominal pain      Vitals  Date Time BP Position Site L\R Cuff Size HR RR TEMP (F) WT  HT  BMI kg/m2 BSA m2 O2 Sat FR L/min FiO2 HC       01/08/2021 08:03 /78 Sitting    79 - R 16 97.7 202lbs 0oz 5'  5\" 33.61 2.05 93 %  21%          Physical Examination  · Constitutional  o Appearance  o : well-nourished, well developed, alert, in no acute distress  · Eyes  o Conjunctivae  o : conjunctivae normal  o Sclerae  o : sclerae white  o Pupils and Irises  o : pupils equal, round, and reactive to light and accommodation bilaterally  o Corneas  o : tear film normal, no lesions present  o Eyelids/Ocular Adnexae  o : eyelid appearance normal, no exudates present, eye moisture level normal  · Respiratory  o Respiratory Effort  o : breathing unlabored  o Inspection of Chest  o : normal appearance, no retractions  o Auscultation of Lungs  o : normal breath sounds throughout  · Cardiovascular  o Heart  o :   § Auscultation of Heart  § : regular rate and rhythm without murmur, PMI normal  o Peripheral Vascular System  o :   § Extremities  § : no cyanosis, clubbing or edema; less than 2 second refill noted  · Musculoskeletal  o General  o : No joint swelling or deformity noted. Muscle tone, strength and development grossly normal.  · Skin and Subcutaneous Tissue  o General Inspection  o : no rashes or lesions " present, no areas of discoloration  · Neurologic  o Mental Status Examination  o : judgement, insight intact, modd and affect appropriate  o Motor Examination  o : strength grossly intact in all four extremities  o Gait and Station  o : normal gait, able to stand without difficulty          Assessment  · Anemia     285.9/D64.9  · Anxiety disorder     300.00/F41.9  · Essential hypertension     401.9/I10  · Hypothyroidism     244.9/E03.9  · Hypokalemia     276.8/E87.6  · Leukopenia     288.50/D72.819       We will recheck labs in 3 months and see how she is doing.  But refilled her new dose of levothyroxine and potassium.       Plan  · Orders  o CBC with Auto Diff [facility] ([id number]) - 401.9/I10 - 04/08/2021  o CMP [facility] ([id number]) - 401.9/I10 - 04/08/2021  o Thyroid Profile (57708, 93832, THYII) - 244.9/E03.9 - 04/08/2021  o ACO-14: Influenza immunization administered or previously received [facility] () - - 01/08/2021  o ACO-39: Current medications updated and reviewed (, 1159F) - - 01/08/2021  · Medications  o Synthroid 150 mcg oral tablet   SIG: take 1 tablet (150 mcg) by oral route once daily for 90 days   DISP: (90) Tablet with 1 refills  Refilled on 01/08/2021     o levothyroxine 137 mcg oral tablet   SIG: take 1 tablet (137 mcg) by oral route once daily for 90 days   DISP: (90) tablets with 1 refills  Discontinued on 01/08/2021     o LEVOTHYROXINE 0.150MG (150MCG) TAB   SIG: TAKE 1 TABLET(150 MCG) BY MOUTH EVERY DAY   DISP: (90) Tablet with 0 refills  Discontinued on 01/08/2021     o Medications have been Reconciled  o Transition of Care or Provider Policy  · Instructions  o Patient was educated/instructed on their diagnosis, treatment and medications prior to discharge from the clinic today.  o Minutes spent with patient including greater than 50% in Education/Counseling/Care Coordination.  o Time spent with the patient was minutes, more than 50% face to face.  · Disposition  o Return in 6  months            Electronically Signed by: MARGARET King -Author on January 8, 2021 08:57:23 AM

## 2021-05-15 VITALS
TEMPERATURE: 98.8 F | DIASTOLIC BLOOD PRESSURE: 94 MMHG | BODY MASS INDEX: 37.77 KG/M2 | SYSTOLIC BLOOD PRESSURE: 155 MMHG | HEIGHT: 66 IN | WEIGHT: 235 LBS | HEART RATE: 79 BPM

## 2021-05-15 VITALS
HEART RATE: 108 BPM | HEIGHT: 65 IN | BODY MASS INDEX: 37.32 KG/M2 | OXYGEN SATURATION: 93 % | TEMPERATURE: 97.5 F | RESPIRATION RATE: 16 BRPM | SYSTOLIC BLOOD PRESSURE: 148 MMHG | DIASTOLIC BLOOD PRESSURE: 72 MMHG | WEIGHT: 224 LBS

## 2021-05-15 VITALS
HEART RATE: 91 BPM | TEMPERATURE: 97.4 F | DIASTOLIC BLOOD PRESSURE: 77 MMHG | SYSTOLIC BLOOD PRESSURE: 134 MMHG | WEIGHT: 207 LBS | BODY MASS INDEX: 34.49 KG/M2 | RESPIRATION RATE: 18 BRPM | OXYGEN SATURATION: 97 % | HEIGHT: 65 IN

## 2021-05-15 VITALS
RESPIRATION RATE: 16 BRPM | OXYGEN SATURATION: 95 % | HEART RATE: 102 BPM | SYSTOLIC BLOOD PRESSURE: 132 MMHG | WEIGHT: 220 LBS | HEIGHT: 65 IN | DIASTOLIC BLOOD PRESSURE: 82 MMHG | TEMPERATURE: 97.4 F | BODY MASS INDEX: 36.65 KG/M2

## 2021-05-15 VITALS
WEIGHT: 221 LBS | TEMPERATURE: 97.6 F | OXYGEN SATURATION: 100 % | DIASTOLIC BLOOD PRESSURE: 84 MMHG | HEIGHT: 65 IN | BODY MASS INDEX: 36.82 KG/M2 | HEART RATE: 78 BPM | SYSTOLIC BLOOD PRESSURE: 124 MMHG | RESPIRATION RATE: 16 BRPM

## 2021-05-15 VITALS
HEART RATE: 73 BPM | TEMPERATURE: 97.1 F | SYSTOLIC BLOOD PRESSURE: 141 MMHG | OXYGEN SATURATION: 99 % | WEIGHT: 212 LBS | BODY MASS INDEX: 35.32 KG/M2 | HEIGHT: 65 IN | RESPIRATION RATE: 16 BRPM | DIASTOLIC BLOOD PRESSURE: 81 MMHG

## 2021-05-15 VITALS
DIASTOLIC BLOOD PRESSURE: 79 MMHG | HEART RATE: 81 BPM | WEIGHT: 207.31 LBS | OXYGEN SATURATION: 99 % | HEIGHT: 65 IN | TEMPERATURE: 98 F | BODY MASS INDEX: 34.54 KG/M2 | SYSTOLIC BLOOD PRESSURE: 132 MMHG | RESPIRATION RATE: 16 BRPM

## 2021-05-16 VITALS
RESPIRATION RATE: 16 BRPM | OXYGEN SATURATION: 98 % | WEIGHT: 205 LBS | SYSTOLIC BLOOD PRESSURE: 139 MMHG | HEART RATE: 96 BPM | TEMPERATURE: 97.9 F | BODY MASS INDEX: 34.16 KG/M2 | DIASTOLIC BLOOD PRESSURE: 82 MMHG | HEIGHT: 65 IN

## 2021-06-02 ENCOUNTER — HOSPITAL ENCOUNTER (OUTPATIENT)
Dept: GENERAL RADIOLOGY | Facility: HOSPITAL | Age: 61
Discharge: HOME | End: 2021-06-02
Attending: NURSE PRACTITIONER

## 2021-06-17 ENCOUNTER — OFFICE VISIT (OUTPATIENT)
Dept: PLASTIC SURGERY | Facility: CLINIC | Age: 61
End: 2021-06-17

## 2021-06-17 VITALS
HEIGHT: 66 IN | BODY MASS INDEX: 31.53 KG/M2 | SYSTOLIC BLOOD PRESSURE: 116 MMHG | WEIGHT: 196.2 LBS | TEMPERATURE: 97.2 F | HEART RATE: 87 BPM | OXYGEN SATURATION: 98 % | DIASTOLIC BLOOD PRESSURE: 74 MMHG

## 2021-06-17 DIAGNOSIS — L91.0 KELOID: Primary | ICD-10-CM

## 2021-06-17 PROCEDURE — 99212 OFFICE O/P EST SF 10 MIN: CPT | Performed by: NURSE PRACTITIONER

## 2021-06-17 NOTE — PROGRESS NOTES
"Chief Complaint  Follow-up (Chest keloid)    Subjective              History of Present Illness  Festus French is a 61 y.o. female who presents to [facility] as a follow up from [facility] for chest keloid. Is very tender and feels like a tugging sensation. The tugging is better than before, but still persistent. Tenderness has increased and is getting wider.    Allergies: Penicillins and Morphine  Allergies Reconciled.    Review of Systems     Objective     /74 (BP Location: Right arm, Patient Position: Sitting, Cuff Size: Adult)   Pulse 87   Temp 97.2 °F (36.2 °C) (Temporal)   Ht 167.6 cm (66\")   Wt 89 kg (196 lb 3.2 oz)   SpO2 98%   BMI 31.67 kg/m²     Body mass index is 31.67 kg/m².    Physical Exam   Cardiovascular: Normal rate.     Pulmonary/Chest  Effort normal.     Breast            Result Review :       3.5 by 5mm keloid on left lower breast inframmary fold.             Assessment and Plan      Diagnoses and all orders for this visit:    1. Keloid (Primary)        Plan:  • Discussed risk and benefits of steroid injection. Patient would like to proceed. Return to clinic for steroid injection. Will submit to insurance.         Follow Up     Return for steroid injection to keloid left breast.    Patient was given instructions and counseling regarding her condition or for health maintenance advice. Please see specific information pulled into the AVS if appropriate.     Yokasta Combs, MARGARET  06/17/2021  " Rhinorrhea

## 2021-06-30 RX ORDER — BUSPIRONE HYDROCHLORIDE 10 MG/1
TABLET ORAL
Qty: 90 TABLET | Refills: 1 | Status: SHIPPED | OUTPATIENT
Start: 2021-06-30 | End: 2021-08-23 | Stop reason: SDUPTHER

## 2021-06-30 RX ORDER — POTASSIUM CHLORIDE 750 MG/1
TABLET, FILM COATED, EXTENDED RELEASE ORAL
Qty: 90 TABLET | Refills: 1 | Status: SHIPPED | OUTPATIENT
Start: 2021-06-30 | End: 2021-08-23 | Stop reason: SDUPTHER

## 2021-08-16 PROCEDURE — U0003 INFECTIOUS AGENT DETECTION BY NUCLEIC ACID (DNA OR RNA); SEVERE ACUTE RESPIRATORY SYNDROME CORONAVIRUS 2 (SARS-COV-2) (CORONAVIRUS DISEASE [COVID-19]), AMPLIFIED PROBE TECHNIQUE, MAKING USE OF HIGH THROUGHPUT TECHNOLOGIES AS DESCRIBED BY CMS-2020-01-R: HCPCS | Performed by: EMERGENCY MEDICINE

## 2021-08-23 ENCOUNTER — OFFICE VISIT (OUTPATIENT)
Dept: FAMILY MEDICINE CLINIC | Facility: CLINIC | Age: 61
End: 2021-08-23

## 2021-08-23 ENCOUNTER — PROCEDURE VISIT (OUTPATIENT)
Dept: PLASTIC SURGERY | Facility: CLINIC | Age: 61
End: 2021-08-23

## 2021-08-23 ENCOUNTER — LAB (OUTPATIENT)
Dept: LAB | Facility: HOSPITAL | Age: 61
End: 2021-08-23

## 2021-08-23 VITALS
BODY MASS INDEX: 30.95 KG/M2 | HEIGHT: 66 IN | RESPIRATION RATE: 16 BRPM | WEIGHT: 192.6 LBS | DIASTOLIC BLOOD PRESSURE: 70 MMHG | OXYGEN SATURATION: 94 % | HEART RATE: 76 BPM | SYSTOLIC BLOOD PRESSURE: 110 MMHG | TEMPERATURE: 97.1 F

## 2021-08-23 VITALS
OXYGEN SATURATION: 94 % | DIASTOLIC BLOOD PRESSURE: 83 MMHG | HEART RATE: 80 BPM | TEMPERATURE: 98.5 F | SYSTOLIC BLOOD PRESSURE: 134 MMHG

## 2021-08-23 DIAGNOSIS — Z82.49 FAMILY HISTORY OF BRAIN ANEURYSM: ICD-10-CM

## 2021-08-23 DIAGNOSIS — F41.9 ANXIETY: ICD-10-CM

## 2021-08-23 DIAGNOSIS — I10 ESSENTIAL HYPERTENSION: ICD-10-CM

## 2021-08-23 DIAGNOSIS — Z01.84 IMMUNITY STATUS TESTING: ICD-10-CM

## 2021-08-23 DIAGNOSIS — D70.8 OTHER NEUTROPENIA (HCC): ICD-10-CM

## 2021-08-23 DIAGNOSIS — E53.8 VITAMIN B 12 DEFICIENCY: ICD-10-CM

## 2021-08-23 DIAGNOSIS — L91.0 KELOID: Primary | ICD-10-CM

## 2021-08-23 DIAGNOSIS — E07.9 DISORDER OF THYROID: ICD-10-CM

## 2021-08-23 DIAGNOSIS — I10 ESSENTIAL HYPERTENSION: Primary | ICD-10-CM

## 2021-08-23 DIAGNOSIS — D72.820 LYMPHOCYTOSIS: ICD-10-CM

## 2021-08-23 DIAGNOSIS — D69.6 THROMBOCYTOPENIA, ACQUIRED (HCC): ICD-10-CM

## 2021-08-23 PROBLEM — R01.1 HEART MURMUR: Status: ACTIVE | Noted: 2021-08-23

## 2021-08-23 PROBLEM — H91.90 DEAFNESS: Status: ACTIVE | Noted: 2021-08-23

## 2021-08-23 PROBLEM — J01.80 OTHER ACUTE SINUSITIS: Status: ACTIVE | Noted: 2021-08-23

## 2021-08-23 PROBLEM — M19.90 ARTHRITIS: Status: ACTIVE | Noted: 2021-08-23

## 2021-08-23 PROBLEM — D64.9 ANEMIA: Status: ACTIVE | Noted: 2021-08-23

## 2021-08-23 PROBLEM — R61 NIGHT SWEATS: Status: ACTIVE | Noted: 2021-08-23

## 2021-08-23 LAB
BASOPHILS # BLD AUTO: 0.05 10*3/MM3 (ref 0–0.2)
BASOPHILS NFR BLD AUTO: 2 % (ref 0–1.5)
BILIRUB UR QL STRIP: NEGATIVE
CHOLEST SERPL-MCNC: 167 MG/DL (ref 0–200)
CLARITY UR: ABNORMAL
COLOR UR: YELLOW
DEPRECATED RDW RBC AUTO: 36.4 FL (ref 37–54)
EOSINOPHIL # BLD AUTO: 0.05 10*3/MM3 (ref 0–0.4)
EOSINOPHIL NFR BLD AUTO: 2 % (ref 0.3–6.2)
ERYTHROCYTE [DISTWIDTH] IN BLOOD BY AUTOMATED COUNT: 12.1 % (ref 12.3–15.4)
GLUCOSE UR STRIP-MCNC: NEGATIVE MG/DL
HCT VFR BLD AUTO: 37.4 % (ref 34–46.6)
HDLC SERPL QL: 3.55
HDLC SERPL-MCNC: 47 MG/DL (ref 40–60)
HGB BLD-MCNC: 12.6 G/DL (ref 12–15.9)
HGB UR QL STRIP.AUTO: NEGATIVE
KETONES UR QL STRIP: NEGATIVE
LDLC SERPL CALC-MCNC: 107 MG/DL (ref 0–100)
LEUKOCYTE ESTERASE UR QL STRIP.AUTO: ABNORMAL
LYMPHOCYTES # BLD AUTO: 1.01 10*3/MM3 (ref 0.7–3.1)
LYMPHOCYTES NFR BLD AUTO: 41.1 % (ref 19.6–45.3)
MCH RBC QN AUTO: 28.3 PG (ref 26.6–33)
MCHC RBC AUTO-ENTMCNC: 33.7 G/DL (ref 31.5–35.7)
MCV RBC AUTO: 83.9 FL (ref 79–97)
MONOCYTES # BLD AUTO: 0.27 10*3/MM3 (ref 0.1–0.9)
MONOCYTES NFR BLD AUTO: 11 % (ref 5–12)
NEUTROPHILS NFR BLD AUTO: 1.08 10*3/MM3 (ref 1.7–7)
NEUTROPHILS NFR BLD AUTO: 43.9 % (ref 42.7–76)
NITRITE UR QL STRIP: NEGATIVE
PH UR STRIP.AUTO: 7.5 [PH] (ref 5–8)
PLATELET # BLD AUTO: 164 10*3/MM3 (ref 140–450)
PMV BLD AUTO: 13.8 FL (ref 6–12)
PROT UR QL STRIP: ABNORMAL
RBC # BLD AUTO: 4.46 10*6/MM3 (ref 3.77–5.28)
SP GR UR STRIP: 1.02 (ref 1–1.03)
TRIGL SERPL-MCNC: 66 MG/DL (ref 0–150)
UROBILINOGEN UR QL STRIP: ABNORMAL
VIT B12 BLD-MCNC: 1753 PG/ML (ref 211–946)
VLDLC SERPL-MCNC: 13 MG/DL (ref 5–40)
WBC # BLD AUTO: 2.46 10*3/MM3 (ref 3.4–10.8)

## 2021-08-23 PROCEDURE — 80053 COMPREHEN METABOLIC PANEL: CPT

## 2021-08-23 PROCEDURE — 86787 VARICELLA-ZOSTER ANTIBODY: CPT

## 2021-08-23 PROCEDURE — 85025 COMPLETE CBC W/AUTO DIFF WBC: CPT

## 2021-08-23 PROCEDURE — 87086 URINE CULTURE/COLONY COUNT: CPT

## 2021-08-23 PROCEDURE — 84443 ASSAY THYROID STIM HORMONE: CPT

## 2021-08-23 PROCEDURE — 81001 URINALYSIS AUTO W/SCOPE: CPT

## 2021-08-23 PROCEDURE — 99214 OFFICE O/P EST MOD 30 MIN: CPT | Performed by: NURSE PRACTITIONER

## 2021-08-23 PROCEDURE — 36415 COLL VENOUS BLD VENIPUNCTURE: CPT

## 2021-08-23 PROCEDURE — 80061 LIPID PANEL: CPT

## 2021-08-23 PROCEDURE — 11900 INJECT SKIN LESIONS </W 7: CPT | Performed by: NURSE PRACTITIONER

## 2021-08-23 PROCEDURE — 82607 VITAMIN B-12: CPT

## 2021-08-23 RX ORDER — HYDROCHLOROTHIAZIDE 50 MG/1
50 TABLET ORAL DAILY
Qty: 90 TABLET | Refills: 1 | Status: SHIPPED | OUTPATIENT
Start: 2021-08-23 | End: 2022-02-14

## 2021-08-23 RX ORDER — BUPROPION HYDROCHLORIDE 300 MG/1
300 TABLET ORAL DAILY
Qty: 90 TABLET | Refills: 1 | Status: SHIPPED | OUTPATIENT
Start: 2021-08-23 | End: 2021-11-16

## 2021-08-23 RX ORDER — POTASSIUM CHLORIDE 750 MG/1
10 TABLET, FILM COATED, EXTENDED RELEASE ORAL DAILY
Qty: 90 TABLET | Refills: 1 | Status: SHIPPED | OUTPATIENT
Start: 2021-08-23 | End: 2022-07-12

## 2021-08-23 RX ORDER — LEVOTHYROXINE SODIUM 0.15 MG/1
150 TABLET ORAL DAILY
Qty: 90 TABLET | Refills: 1 | Status: SHIPPED | OUTPATIENT
Start: 2021-08-23 | End: 2022-02-14

## 2021-08-23 RX ORDER — CLONIDINE HYDROCHLORIDE 0.3 MG/1
0.3 TABLET ORAL DAILY
Qty: 90 TABLET | Refills: 1 | Status: SHIPPED | OUTPATIENT
Start: 2021-08-23 | End: 2022-02-14

## 2021-08-23 RX ORDER — BUSPIRONE HYDROCHLORIDE 10 MG/1
10 TABLET ORAL 3 TIMES DAILY
Qty: 180 TABLET | Refills: 1 | Status: SHIPPED | OUTPATIENT
Start: 2021-08-23 | End: 2022-05-09

## 2021-08-23 RX ORDER — FOSINOPRIL SODIUM 40 MG/1
40 TABLET ORAL DAILY
Qty: 90 TABLET | Refills: 1 | Status: SHIPPED | OUTPATIENT
Start: 2021-08-23 | End: 2022-02-14

## 2021-08-23 RX ORDER — AMLODIPINE BESYLATE 10 MG/1
10 TABLET ORAL DAILY
Qty: 90 TABLET | Refills: 1 | Status: SHIPPED | OUTPATIENT
Start: 2021-08-23 | End: 2022-02-14

## 2021-08-23 NOTE — PROGRESS NOTES
Chief Complaint  Hypertension (f/u), Hypothyroidism (f/u), and Depression (f/u)    Subjective        Festus French presents to [facility]  Hypothyroidism:  Doing well on medication.    Hypertension:  Doing well and controlled on medication.  110/70.    Anxiety:  Continues on Wellbutrin.    Sister has recently  of brain aneurysm.  Unsure if genetic.        Past History:    Medical History: has a past medical history of Benign heart murmur, Hypertension, Hypothyroidism, Leukopenia, Osteopenia, and Sickle cell anemia without crisis (CMS/HCC).     Surgical History: has a past surgical history that includes Breast Reduction (Bilateral, ); Hysterectomy (); and Colonoscopy (2021).     Family History: family history includes Alcohol abuse in her father; Aneurysm in her sister; Arthritis in her mother; Breast cancer (age of onset: 65) in her mother; COPD in her mother; Cirrhosis in her father; Diabetes in her brother, mother, and sister; Fibromyalgia in her sister; Heart disease in her brother and mother; Hypertension in her brother, mother, and sister; Sarcoidosis in her sister; Stroke in her sister.     Social History: reports that she quit smoking about 5 years ago. Her smoking use included cigarettes. She smoked 0.30 packs per day. She has never used smokeless tobacco. She reports that she does not drink alcohol and does not use drugs.    Allergies: Penicillins and Morphine          Current Outpatient Medications:   •  amLODIPine (NORVASC) 10 MG tablet, Take 1 tablet by mouth Daily., Disp: 90 tablet, Rfl: 1  •  aspirin 81 MG EC tablet, Take 81 mg by mouth daily., Disp: , Rfl:   •  busPIRone (BUSPAR) 10 MG tablet, Take 1 tablet by mouth 3 (Three) Times a Day., Disp: 180 tablet, Rfl: 1  •  CALCIUM PO, Take 600 mg by mouth Daily., Disp: , Rfl:   •  cloNIDine (CATAPRES) 0.3 MG tablet, Take 1 tablet by mouth Daily., Disp: 90 tablet, Rfl: 1  •  Cyanocobalamin (B-12 PO), Take  "1,000 mg by mouth Daily., Disp: , Rfl:   •  fosinopril (MONOPRIL) 40 MG tablet, Take 1 tablet by mouth Daily., Disp: 90 tablet, Rfl: 1  •  hydroCHLOROthiazide (HYDRODIURIL) 50 MG tablet, Take 1 tablet by mouth Daily., Disp: 90 tablet, Rfl: 1  •  levothyroxine (SYNTHROID, LEVOTHROID) 150 MCG tablet, Take 1 tablet by mouth Daily., Disp: 90 tablet, Rfl: 1  •  phentermine (ADIPEX-P) 37.5 MG tablet, Take 37.5 mg by mouth Every Other Day., Disp: , Rfl: 0  •  potassium chloride 10 MEQ CR tablet, Take 1 tablet by mouth Daily., Disp: 90 tablet, Rfl: 1  •  topiramate (TOPAMAX) 50 MG tablet, Take 50 mg by mouth 2 (Two) Times a Day., Disp: , Rfl:   •  buPROPion XL (Wellbutrin XL) 300 MG 24 hr tablet, Take 1 tablet by mouth Daily., Disp: 90 tablet, Rfl: 1    Medications Discontinued During This Encounter   Medication Reason   • Multiple Vitamin (MULTI VITAMIN DAILY PO) *Therapy completed   • diphenhydrAMINE (BENADRYL) 25 mg capsule *Therapy completed   • buPROPion HCl (WELLBUTRIN PO) Duplicate order   • amLODIPine (NORVASC) 10 MG tablet Reorder   • fosinopril (MONOPRIL) 40 MG tablet Reorder   • hydrochlorothiazide (HYDRODIURIL) 50 MG tablet Reorder   • levothyroxine (SYNTHROID, LEVOTHROID) 150 MCG tablet Reorder   • CloNIDine (CATAPRES) 0.3 MG tablet Reorder   • busPIRone (BUSPAR) 10 MG tablet Reorder   • potassium chloride 10 MEQ CR tablet Reorder         Review of Systems   Constitutional: Negative for fever.   Respiratory: Negative for cough and shortness of breath.    Cardiovascular: Negative for chest pain, palpitations and leg swelling.   Neurological: Negative for dizziness, weakness, numbness and headache.        Objective         Vitals:    08/23/21 0816   BP: 110/70   BP Location: Left arm   Patient Position: Sitting   Cuff Size: Large Adult   Pulse: 76   Resp: 16   Temp: 97.1 °F (36.2 °C)   TempSrc: Infrared   SpO2: 94%   Weight: 87.4 kg (192 lb 9.6 oz)   Height: 167.6 cm (66\")     Body mass index is 31.09 kg/m². "         Physical Exam  Vitals reviewed.   Constitutional:       Appearance: Normal appearance. She is well-developed.   HENT:      Head: Normocephalic and atraumatic.      Mouth/Throat:      Pharynx: No oropharyngeal exudate.   Eyes:      Conjunctiva/sclera: Conjunctivae normal.      Pupils: Pupils are equal, round, and reactive to light.   Cardiovascular:      Rate and Rhythm: Normal rate and regular rhythm.      Heart sounds: No murmur heard.   No friction rub. No gallop.    Pulmonary:      Effort: Pulmonary effort is normal.      Breath sounds: Normal breath sounds. No wheezing or rhonchi.   Skin:     General: Skin is warm and dry.   Neurological:      Mental Status: She is alert and oriented to person, place, and time.   Psychiatric:         Mood and Affect: Mood and affect normal.         Behavior: Behavior normal.         Thought Content: Thought content normal.         Judgment: Judgment normal.             Result Review :               Assessment and Plan     Diagnoses and all orders for this visit:    1. Essential hypertension (Primary)  Comments:  Continue fosinopril, norvasc, clonidine, hctz at current dose.  Orders:  -     amLODIPine (NORVASC) 10 MG tablet; Take 1 tablet by mouth Daily.  Dispense: 90 tablet; Refill: 1  -     cloNIDine (CATAPRES) 0.3 MG tablet; Take 1 tablet by mouth Daily.  Dispense: 90 tablet; Refill: 1  -     fosinopril (MONOPRIL) 40 MG tablet; Take 1 tablet by mouth Daily.  Dispense: 90 tablet; Refill: 1  -     hydroCHLOROthiazide (HYDRODIURIL) 50 MG tablet; Take 1 tablet by mouth Daily.  Dispense: 90 tablet; Refill: 1  -     potassium chloride 10 MEQ CR tablet; Take 1 tablet by mouth Daily.  Dispense: 90 tablet; Refill: 1  -     Comprehensive metabolic panel; Future  -     Lipid Panel w/ Chol/HDL Ratio; Future  -     Urinalysis With Culture If Indicated -; Future  -     CBC No Differential; Future    2. Disorder of thyroid  Comments:  contineu levoithyroxine at current  dose.  Orders:  -     levothyroxine (SYNTHROID, LEVOTHROID) 150 MCG tablet; Take 1 tablet by mouth Daily.  Dispense: 90 tablet; Refill: 1  -     TSH; Future    3. Anxiety  -     buPROPion XL (Wellbutrin XL) 300 MG 24 hr tablet; Take 1 tablet by mouth Daily.  Dispense: 90 tablet; Refill: 1  -     busPIRone (BUSPAR) 10 MG tablet; Take 1 tablet by mouth 3 (Three) Times a Day.  Dispense: 180 tablet; Refill: 1    4. Family history of brain aneurysm  -     MRI Angiogram Head With & Without Contrast; Future    5. Vitamin B 12 deficiency  -     Vitamin B12; Future              Follow Up     Return in about 6 months (around 2/23/2022) for Next scheduled follow up.    Patient was given instructions and counseling regarding her condition or for health maintenance advice. Please see specific information pulled into the AVS if appropriate.

## 2021-08-23 NOTE — PROGRESS NOTES
Chief Complaint  Procedure (steroid injection to keloid)    Subjective              History of Present Illness  Festus French is a 61 y.o. female who presents to [facility] RECON[facility] SURGERY as a follow up from [facility] for chest keloid. Is very tender and feels like a tugging sensation.  Tenderness has increased and is getting wider.    Here today for steroid injection.    Allergies: Penicillins and Morphine  Allergies Reconciled.    Review of Systems     Objective     /83 (BP Location: Left arm, Patient Position: Sitting, Cuff Size: Adult)   Pulse 80   Temp 98.5 °F (36.9 °C) (Temporal)   SpO2 94%     There is no height or weight on file to calculate BMI.    Physical Exam   Cardiovascular: Normal rate.     Pulmonary/Chest  Effort normal.     Breast            Result Review :           Steroid Injection    Date/Time: 8/23/2021 9:58 AM  Performed by: Yokasta Combs APRN  Authorized by: Yokasta Combs APRN   Preparation: Patient was prepped and draped in the usual sterile fashion.  Local anesthesia used: no    Anesthesia:  Local anesthesia used: no    Sedation:  Patient sedated: no    Patient tolerance: patient tolerated the procedure well with no immediate complications  Comments: NDC:1992-6137-59  Exp: 01/2022              Assessment and Plan      Diagnoses and all orders for this visit:    1. Keloid (Primary)    Other orders  -     Steroid Injection  -     Occlusive Silicone Sheets (Silicone Scar Sheets) sheet; 1 application Daily.  Dispense: 1 each; Refill: 2        Plan:  Discussed risk and benefits of steroid injection. Patient would like to proceed.  - Area injected with 40 mg triamcinolone/1ml Lidocaine. Aquaphor and scar massage, wear bra at all times. Silicone sheets. Avoid direct sun exposure x 1 year. RTC one month.         Follow Up     No follow-ups on file.    Patient was given instructions and counseling regarding her condition or for health  maintenance advice. Please see specific information pulled into the AVS if appropriate.     Yokasta Combs, APRN  08/23/2021

## 2021-08-24 LAB
ALBUMIN SERPL-MCNC: 4 G/DL (ref 3.5–5.2)
ALBUMIN/GLOB SERPL: 1.6 G/DL
ALP SERPL-CCNC: 55 U/L (ref 39–117)
ALT SERPL W P-5'-P-CCNC: 13 U/L (ref 1–33)
AMORPH URATE CRY URNS QL MICRO: ABNORMAL /HPF
ANION GAP SERPL CALCULATED.3IONS-SCNC: 10.4 MMOL/L (ref 5–15)
AST SERPL-CCNC: 17 U/L (ref 1–32)
BACTERIA UR QL AUTO: ABNORMAL /HPF
BILIRUB SERPL-MCNC: 0.2 MG/DL (ref 0–1.2)
BUN SERPL-MCNC: 12 MG/DL (ref 8–23)
BUN/CREAT SERPL: 11.9 (ref 7–25)
CALCIUM SPEC-SCNC: 9.1 MG/DL (ref 8.6–10.5)
CHLORIDE SERPL-SCNC: 104 MMOL/L (ref 98–107)
CO2 SERPL-SCNC: 28.6 MMOL/L (ref 22–29)
CREAT SERPL-MCNC: 1.01 MG/DL (ref 0.57–1)
GFR SERPL CREATININE-BSD FRML MDRD: 68 ML/MIN/1.73
GLOBULIN UR ELPH-MCNC: 2.5 GM/DL
GLUCOSE SERPL-MCNC: 77 MG/DL (ref 65–99)
HYALINE CASTS UR QL AUTO: ABNORMAL /LPF
POTASSIUM SERPL-SCNC: 3.2 MMOL/L (ref 3.5–5.2)
PROT SERPL-MCNC: 6.5 G/DL (ref 6–8.5)
RBC # UR: ABNORMAL /HPF
REF LAB TEST METHOD: ABNORMAL
SODIUM SERPL-SCNC: 143 MMOL/L (ref 136–145)
SQUAMOUS #/AREA URNS HPF: ABNORMAL /HPF
TSH SERPL DL<=0.05 MIU/L-ACNC: 0.24 UIU/ML (ref 0.27–4.2)
VZV IGG SER IA-ACNC: 1117 INDEX
WBC UR QL AUTO: ABNORMAL /HPF

## 2021-08-25 DIAGNOSIS — D72.819 LEUKOPENIA, UNSPECIFIED TYPE: Primary | ICD-10-CM

## 2021-08-25 LAB — BACTERIA SPEC AEROBE CULT: NO GROWTH

## 2021-09-09 ENCOUNTER — TELEPHONE (OUTPATIENT)
Dept: FAMILY MEDICINE CLINIC | Facility: CLINIC | Age: 61
End: 2021-09-09

## 2021-09-09 ENCOUNTER — HOSPITAL ENCOUNTER (OUTPATIENT)
Dept: MRI IMAGING | Facility: HOSPITAL | Age: 61
Discharge: HOME | End: 2021-09-09

## 2021-09-09 DIAGNOSIS — Z82.49 FAMILY HISTORY OF BRAIN ANEURYSM: ICD-10-CM

## 2021-09-09 NOTE — TELEPHONE ENCOUNTER
PER THE RADIOLOGIST THEY ARE CHANGING THE ORDER TO MRA OF HEAD WITH OUT CONTRAST     WANTING TO LET YOU KNOW

## 2021-09-10 ENCOUNTER — TELEPHONE (OUTPATIENT)
Dept: FAMILY MEDICINE CLINIC | Facility: CLINIC | Age: 61
End: 2021-09-10

## 2021-09-10 NOTE — TELEPHONE ENCOUNTER
Caller: Festus French    Relationship: Self    Best call back number: [phone]     What is the best time to reach you: ANY    Who are you requesting to speak with (clinical staff, provider,  specific staff member): CLINICAL    What was the call regarding: PATIENT STATES SHE HAS SOME QUESTIONS REGARDING HER RECENT MRI AND WOULD LIKE A CALL BACK TO DISCUSS     Do you require a callback: YES

## 2021-09-21 ENCOUNTER — PROCEDURE VISIT (OUTPATIENT)
Dept: PLASTIC SURGERY | Facility: CLINIC | Age: 61
End: 2021-09-21

## 2021-09-21 VITALS
HEART RATE: 68 BPM | SYSTOLIC BLOOD PRESSURE: 117 MMHG | TEMPERATURE: 97.9 F | DIASTOLIC BLOOD PRESSURE: 77 MMHG | OXYGEN SATURATION: 98 %

## 2021-09-21 DIAGNOSIS — L91.0 KELOID: Primary | ICD-10-CM

## 2021-09-21 PROCEDURE — 11900 INJECT SKIN LESIONS </W 7: CPT | Performed by: NURSE PRACTITIONER

## 2021-09-21 NOTE — PROGRESS NOTES
Chief Complaint  Procedure (steroid injection)    Subjective              History of Present Illness  Festus French is a 61 y.o. female who presents to [facility]LAST[facility] RECONSTRU[facility] SURGERY as a follow up from [facility] for chest keloid. Is very tender and feels like a tugging sensation.  Tender and wide. Had mild improvement with last injection.     S/p steroid injection 8/23/21. Doing much better. Here today for steroid injection.    Allergies: Penicillins and Morphine  Allergies Reconciled.    Review of Systems     Objective     /77 (BP Location: Right arm, Patient Position: Sitting, Cuff Size: Large Adult)   Pulse 68   Temp 97.9 °F (36.6 °C) (Temporal)   SpO2 98%     There is no height or weight on file to calculate BMI.    Physical Exam   Cardiovascular: Normal rate.     Pulmonary/Chest  Effort normal.     Breast            Result Review :           Steroid Injection    Date/Time: 9/21/2021 3:42 PM  Performed by: Yokasta Combs APRN  Authorized by: Yokasta Combs APRN   Preparation: Patient was prepped and draped in the usual sterile fashion.      NDC:  2944-3126-16  Lot: [id number]  Exp: 9/2022     Assessment and Plan      Diagnoses and all orders for this visit:    1. Keloid (Primary)    Other orders  -     Steroid Injection        Plan:  Discussed risk and benefits of steroid injection. Patient would like to proceed.  - Area cleaned with Chloraprep, injected with 40 mg triamcinolone/1ml Lidocaine to right breast Keloid. Aquaphor and scar massage, wear bra at all times. Silicone sheets. Avoid direct sun exposure x 1 year. RTC one month.         Follow Up     Return today (on 9/21/2021).    Patient was given instructions and counseling regarding her condition or for health maintenance advice. Please see specific information pulled into the AVS if appropriate.     MARGARET Ronquillo  09/21/2021

## 2021-09-22 ENCOUNTER — HOSPITAL ENCOUNTER (OUTPATIENT)
Dept: MRI IMAGING | Facility: HOSPITAL | Age: 61
End: 2021-09-22

## 2021-09-22 ENCOUNTER — TELEPHONE (OUTPATIENT)
Dept: FAMILY MEDICINE CLINIC | Facility: CLINIC | Age: 61
End: 2021-09-22

## 2021-09-22 NOTE — TELEPHONE ENCOUNTER
Caller: Festus French    Relationship: Self    Best call back number: [phone]    What is the best time to reach you: ANY     Who are you requesting to speak with CLINICAL     What was the call regarding: PATIENT NEEDS OPEN MRI AND CANCELED MRI IN [place] TODAY     PLEASE ADVISE     Do you require a callback: YES

## 2021-09-22 NOTE — TELEPHONE ENCOUNTER
Caller: Festus French    Relationship to patient: Self    Best call back number: [phone]    Patient is needing: PATIENT CALLED IN AND SAID SHE IS SCHEDULED FOR AN MRI TODAY AND WANTS TO KNOW IF IT IS AN OPEN MRI. SHE SAID IF IT ISN'T SHE HAD REQUESTED SOMETHING TO RELAX HER BE PRESCRIBED. PLEASE CALL PATIENT AND ADVISE.

## 2021-10-22 ENCOUNTER — TELEPHONE (OUTPATIENT)
Dept: FAMILY MEDICINE CLINIC | Facility: CLINIC | Age: 61
End: 2021-10-22

## 2021-10-22 NOTE — TELEPHONE ENCOUNTER
Caller: Festus French    Relationship: Self    Best call back number: [phone]    What medication are you requesting: ANXIETY MEDICATION    What are your current symptoms: PATIENT IS HAVING AN MRI BUT IT'S NOT AN OPEN MRI AND WOULD LIKE SOMETHING TO CALM HER NERVES.     Have you had these symptoms before:    [x] Yes  [] No    Have you been treated for these symptoms before:   [x] Yes  [] No    If a prescription is needed, what is your preferred pharmacy and phone number:      [id number] - [place], [place] - [address] AT [facility] [address] /[facility] & [place] - [phone]  - [phone] FX  [phone]    Additional notes:

## 2021-10-25 ENCOUNTER — PROCEDURE VISIT (OUTPATIENT)
Dept: PLASTIC SURGERY | Facility: CLINIC | Age: 61
End: 2021-10-25

## 2021-10-25 ENCOUNTER — CLINICAL SUPPORT (OUTPATIENT)
Dept: FAMILY MEDICINE CLINIC | Facility: CLINIC | Age: 61
End: 2021-10-25

## 2021-10-25 VITALS
HEIGHT: 66 IN | OXYGEN SATURATION: 97 % | DIASTOLIC BLOOD PRESSURE: 68 MMHG | SYSTOLIC BLOOD PRESSURE: 103 MMHG | WEIGHT: 199 LBS | TEMPERATURE: 97.4 F | BODY MASS INDEX: 31.98 KG/M2 | HEART RATE: 68 BPM | RESPIRATION RATE: 18 BRPM

## 2021-10-25 DIAGNOSIS — F41.9 ANXIETY: ICD-10-CM

## 2021-10-25 DIAGNOSIS — Z51.81 MEDICATION MONITORING ENCOUNTER: Primary | ICD-10-CM

## 2021-10-25 DIAGNOSIS — L91.0 KELOID: Primary | ICD-10-CM

## 2021-10-25 PROCEDURE — 80305 DRUG TEST PRSMV DIR OPT OBS: CPT | Performed by: NURSE PRACTITIONER

## 2021-10-25 PROCEDURE — 11900 INJECT SKIN LESIONS </W 7: CPT | Performed by: NURSE PRACTITIONER

## 2021-10-25 NOTE — PROGRESS NOTES
"Chief Complaint  Follow-up (Keloid)    Subjective              History of Present Illness  Festus French is a 61 y.o. female who presents to [facility]LAST[facility] RECONSTRU[facility] SURGERY as a follow up from [facility] for chest keloid. Patient says she has a lot of improvement     S/p steroid injection 9/21/21. Doing much better. Here today for steroid injection. Still elevated slightly and slightly tender but is improving.    Allergies: Penicillins and Morphine  Allergies Reconciled.    Review of Systems     Objective     /68   Pulse 68   Temp 97.4 °F (36.3 °C) (Temporal)   Resp 18   Ht 167.6 cm (66\")   Wt 90.3 kg (199 lb)   SpO2 97%   BMI 32.12 kg/m²     Body mass index is 32.12 kg/m².    Physical Exam   Cardiovascular: Normal rate.     Pulmonary/Chest  Effort normal.     Breast            Result Review :            NDC:  8340-4739-61  Lot: [id number]  Exp: 9/2022     Assessment and Plan      Diagnoses and all orders for this visit:    1. Keloid (Primary)        Plan:  Discussed risk and benefits of steroid injection. Patient would like to proceed.  - Area cleaned with Chloraprep, injected with 40 mg triamcinolone/1ml Lidocaine to right breast Keloid. Aquaphor and scar massage, wear bra at all times. Silicone sheets. Avoid direct sun exposure x 1 year. Discussed taking a break after this steroid injection, has had a total of 3. Will call office before appt if starts enlarging and getting tender.  RTC 3 months.         Follow Up     No follow-ups on file.    Patient was given instructions and counseling regarding her condition or for health maintenance advice. Please see specific information pulled into the AVS if appropriate.     Yokasta Combs, APRN  10/25/2021  "

## 2021-10-27 ENCOUNTER — TELEPHONE (OUTPATIENT)
Dept: FAMILY MEDICINE CLINIC | Facility: CLINIC | Age: 61
End: 2021-10-27

## 2021-10-27 DIAGNOSIS — Z91.89 AT RISK FOR ANXIETY: Primary | ICD-10-CM

## 2021-10-27 RX ORDER — ALPRAZOLAM 0.5 MG/1
0.25 TABLET ORAL ONCE AS NEEDED
Qty: 1 TABLET | Refills: 0 | Status: SHIPPED | OUTPATIENT
Start: 2021-10-27 | End: 2022-03-01

## 2021-10-27 NOTE — TELEPHONE ENCOUNTER
Caller: Festus French    Relationship: Self    Best call back number: [phone]    What medication are you requesting: NERVE MEDICATION, PATIENT STATED SHE THINKS IT WAS ADAVAN    Have you had these symptoms before:    [] Yes  [x] No    Have you been treated for these symptoms before:   [] Yes  [x] No    If a prescription is needed, what is your preferred pharmacy and phone number:  [id number] - [place], [place] - [address] AT [address]/[facility] & [place] - [phone] [facility] [phone] FX      Additional notes: PATIENT STATED THAT SHE WAS INFORMED TO ASK FOR THIS MEDICATION AND INFORM THAT SHE HAS MRI SCHEDULED 11/5/21 IN [place]

## 2021-11-08 ENCOUNTER — TELEPHONE (OUTPATIENT)
Dept: FAMILY MEDICINE CLINIC | Facility: CLINIC | Age: 61
End: 2021-11-08

## 2021-11-08 DIAGNOSIS — F41.8 SITUATIONAL ANXIETY: Primary | ICD-10-CM

## 2021-11-08 RX ORDER — LORAZEPAM 0.5 MG/1
0.5 TABLET ORAL EVERY 8 HOURS PRN
Qty: 1 TABLET | Refills: 0 | Status: SHIPPED | OUTPATIENT
Start: 2021-11-08 | End: 2022-03-01

## 2021-11-08 NOTE — TELEPHONE ENCOUNTER
We can try ativan and see.  That is the only 2 that we usually give unless she would like to try benadryl

## 2021-11-08 NOTE — TELEPHONE ENCOUNTER
Caller: Festus French    Relationship: Self    Best call back number: [phone]     What is the best time to reach you: ANY    Who are you requesting to speak with (clinical staff, provider,  specific staff member): CLINICAL    What was the call regarding: PATIENT STATES SHE WAS SUPPOSED TO BE GETTING MEDICATION TO RELAX HER FOR AN MRI, PATIENT STATES SOMETHING WAS PRESCRIBED BUT IT DID NOT RELAX HER AND SHE COULD NOT GET THE MRI DONE. PATIENT STATES SHE WOULD LIKE A CALL BACK TO SEE WHAT ELSE CAN BE DONE     Do you require a callback: YES

## 2021-11-15 ENCOUNTER — TELEPHONE (OUTPATIENT)
Dept: FAMILY MEDICINE CLINIC | Facility: CLINIC | Age: 61
End: 2021-11-15

## 2021-11-15 NOTE — TELEPHONE ENCOUNTER
Caller: Festus French    Relationship: Self    Best call back number: [phone]    What is the medical concern/diagnosis:     What specialty or service is being requested: MRI    What is the provider, practice or medical service name: [facility]    What is the office location: [address] [place] [facility]    What is the office phone number: [phone]  FAX NUMBER: [phone]    Any additional details: PATIENT ALREADY HAS A REFERRAL FOR AN MRI HOWEVER SHE'D PREFER TO GO HERE BECAUSE THEY HAVE A DIFFERENT TYPE OF MACHINE PATIENT IS MORE COMFORTABLE WITH.

## 2021-11-15 NOTE — TELEPHONE ENCOUNTER
Caller: Festus French    Relationship to patient: Self    Best call back number: [phone]    Patient is needing: PATIENT CALLED IN AND SAID SHE WAS CANCELLING HER APPOINTMENT AT [facility] AND WOULD LIKE HER REFERRAL TO BE SENT TO [facility]    Caller: Festus French     Relationship: Self     Best call back number: [phone]     What is the medical concern/diagnosis:      What specialty or service is being requested: MRI     What is the provider, practice or medical service name: [facility]     What is the office location: [address] [facility] [address] [place] [facility]     What is the office phone number: [phone]  FAX NUMBER: [phone]     Any additional details: PATIENT ALREADY HAS A REFERRAL FOR AN MRI HOWEVER SHE'D PREFER TO GO HERE BECAUSE THEY HAVE A DIFFERENT TYPE OF MACHINE PATIENT IS MORE COMFORTABLE WITH.

## 2021-11-16 DIAGNOSIS — F41.9 ANXIETY: ICD-10-CM

## 2021-11-16 RX ORDER — BUPROPION HYDROCHLORIDE 300 MG/1
TABLET ORAL
Qty: 90 TABLET | Refills: 1 | Status: SHIPPED | OUTPATIENT
Start: 2021-11-16 | End: 2022-09-02

## 2021-11-18 NOTE — TELEPHONE ENCOUNTER
I spoke with [facility] today, they requested demographics, insurance and referral be faxed to their office and they will contact patient to schedule.

## 2021-11-19 ENCOUNTER — TELEPHONE (OUTPATIENT)
Dept: FAMILY MEDICINE CLINIC | Facility: CLINIC | Age: 61
End: 2021-11-19

## 2021-11-19 NOTE — TELEPHONE ENCOUNTER
Caller: FREDRICK    Relationship: [facility]     Best call back number:[phone]    What is the best time to reach you: ANY     Who are you requesting to speak with (clinical staff, provider,  specific staff member): CLINICAL     What was the call regarding: FREDRICK WAS CALLING FOR A PRIOR AUTHORATION FOR PATIENT MRI     Do you require a callback: YES

## 2021-12-01 ENCOUNTER — TELEPHONE (OUTPATIENT)
Dept: FAMILY MEDICINE CLINIC | Facility: CLINIC | Age: 61
End: 2021-12-01

## 2021-12-10 ENCOUNTER — LAB (OUTPATIENT)
Dept: LAB | Facility: HOSPITAL | Age: 61
End: 2021-12-10

## 2021-12-10 DIAGNOSIS — D72.819 LEUKOPENIA, UNSPECIFIED TYPE: ICD-10-CM

## 2021-12-10 LAB
BASOPHILS # BLD AUTO: 0.04 10*3/MM3 (ref 0–0.2)
BASOPHILS NFR BLD AUTO: 1.1 % (ref 0–1.5)
DEPRECATED RDW RBC AUTO: 38.8 FL (ref 37–54)
EOSINOPHIL # BLD AUTO: 0.08 10*3/MM3 (ref 0–0.4)
EOSINOPHIL NFR BLD AUTO: 2.3 % (ref 0.3–6.2)
ERYTHROCYTE [DISTWIDTH] IN BLOOD BY AUTOMATED COUNT: 12.5 % (ref 12.3–15.4)
HCT VFR BLD AUTO: 41.7 % (ref 34–46.6)
HGB BLD-MCNC: 13.7 G/DL (ref 12–15.9)
IMM GRANULOCYTES # BLD AUTO: 0.01 10*3/MM3 (ref 0–0.05)
IMM GRANULOCYTES NFR BLD AUTO: 0.3 % (ref 0–0.5)
LYMPHOCYTES # BLD AUTO: 1.25 10*3/MM3 (ref 0.7–3.1)
LYMPHOCYTES NFR BLD AUTO: 35.4 % (ref 19.6–45.3)
MCH RBC QN AUTO: 28 PG (ref 26.6–33)
MCHC RBC AUTO-ENTMCNC: 32.9 G/DL (ref 31.5–35.7)
MCV RBC AUTO: 85.3 FL (ref 79–97)
MONOCYTES # BLD AUTO: 0.4 10*3/MM3 (ref 0.1–0.9)
MONOCYTES NFR BLD AUTO: 11.3 % (ref 5–12)
NEUTROPHILS NFR BLD AUTO: 1.75 10*3/MM3 (ref 1.7–7)
NEUTROPHILS NFR BLD AUTO: 49.6 % (ref 42.7–76)
NRBC BLD AUTO-RTO: 0 /100 WBC (ref 0–0.2)
PLATELET # BLD AUTO: 180 10*3/MM3 (ref 140–450)
PMV BLD AUTO: 13.5 FL (ref 6–12)
RBC # BLD AUTO: 4.89 10*6/MM3 (ref 3.77–5.28)
WBC NRBC COR # BLD: 3.53 10*3/MM3 (ref 3.4–10.8)

## 2021-12-10 PROCEDURE — 36415 COLL VENOUS BLD VENIPUNCTURE: CPT

## 2021-12-10 PROCEDURE — 85025 COMPLETE CBC W/AUTO DIFF WBC: CPT

## 2022-02-04 ENCOUNTER — APPOINTMENT (OUTPATIENT)
Dept: LAB | Facility: HOSPITAL | Age: 62
End: 2022-02-04

## 2022-02-14 DIAGNOSIS — E07.9 DISORDER OF THYROID: ICD-10-CM

## 2022-02-14 DIAGNOSIS — I10 ESSENTIAL HYPERTENSION: ICD-10-CM

## 2022-02-14 RX ORDER — CLONIDINE HYDROCHLORIDE 0.3 MG/1
0.3 TABLET ORAL DAILY
Qty: 90 TABLET | Refills: 1 | Status: SHIPPED | OUTPATIENT
Start: 2022-02-14 | End: 2022-08-15

## 2022-02-14 RX ORDER — AMLODIPINE BESYLATE 10 MG/1
10 TABLET ORAL DAILY
Qty: 90 TABLET | Refills: 1 | Status: SHIPPED | OUTPATIENT
Start: 2022-02-14 | End: 2022-08-15

## 2022-02-14 RX ORDER — LEVOTHYROXINE SODIUM 0.15 MG/1
150 TABLET ORAL DAILY
Qty: 90 TABLET | Refills: 1 | Status: SHIPPED | OUTPATIENT
Start: 2022-02-14 | End: 2022-08-15

## 2022-02-14 RX ORDER — HYDROCHLOROTHIAZIDE 50 MG/1
50 TABLET ORAL DAILY
Qty: 90 TABLET | Refills: 1 | Status: SHIPPED | OUTPATIENT
Start: 2022-02-14 | End: 2022-08-15

## 2022-02-14 RX ORDER — FOSINOPRIL SODIUM 40 MG/1
40 TABLET ORAL DAILY
Qty: 90 TABLET | Refills: 1 | Status: SHIPPED | OUTPATIENT
Start: 2022-02-14 | End: 2022-08-15

## 2022-02-16 ENCOUNTER — LAB (OUTPATIENT)
Dept: LAB | Facility: HOSPITAL | Age: 62
End: 2022-02-16

## 2022-02-16 ENCOUNTER — OFFICE VISIT (OUTPATIENT)
Dept: ONCOLOGY | Facility: CLINIC | Age: 62
End: 2022-02-16

## 2022-02-16 VITALS
DIASTOLIC BLOOD PRESSURE: 85 MMHG | HEIGHT: 66 IN | RESPIRATION RATE: 17 BRPM | SYSTOLIC BLOOD PRESSURE: 125 MMHG | TEMPERATURE: 97.5 F | HEART RATE: 102 BPM | BODY MASS INDEX: 31.66 KG/M2 | WEIGHT: 197 LBS | OXYGEN SATURATION: 100 %

## 2022-02-16 DIAGNOSIS — D69.6 THROMBOCYTOPENIA, ACQUIRED: ICD-10-CM

## 2022-02-16 DIAGNOSIS — D69.6 THROMBOCYTOPENIA, ACQUIRED: Primary | ICD-10-CM

## 2022-02-16 LAB
BASOPHILS # BLD AUTO: 0.04 10*3/MM3 (ref 0–0.2)
BASOPHILS NFR BLD AUTO: 0.9 % (ref 0–1.5)
DEPRECATED RDW RBC AUTO: 36.4 FL (ref 37–54)
EOSINOPHIL # BLD AUTO: 0.12 10*3/MM3 (ref 0–0.4)
EOSINOPHIL NFR BLD AUTO: 2.7 % (ref 0.3–6.2)
ERYTHROCYTE [DISTWIDTH] IN BLOOD BY AUTOMATED COUNT: 12.1 % (ref 12.3–15.4)
HCT VFR BLD AUTO: 44.4 % (ref 34–46.6)
HGB BLD-MCNC: 14.6 G/DL (ref 12–15.9)
IMM GRANULOCYTES # BLD AUTO: 0.03 10*3/MM3 (ref 0–0.05)
IMM GRANULOCYTES NFR BLD AUTO: 0.7 % (ref 0–0.5)
LYMPHOCYTES # BLD AUTO: 1.77 10*3/MM3 (ref 0.7–3.1)
LYMPHOCYTES NFR BLD AUTO: 40 % (ref 19.6–45.3)
MCH RBC QN AUTO: 27.5 PG (ref 26.6–33)
MCHC RBC AUTO-ENTMCNC: 32.9 G/DL (ref 31.5–35.7)
MCV RBC AUTO: 83.8 FL (ref 79–97)
MONOCYTES # BLD AUTO: 0.48 10*3/MM3 (ref 0.1–0.9)
MONOCYTES NFR BLD AUTO: 10.9 % (ref 5–12)
NEUTROPHILS NFR BLD AUTO: 1.98 10*3/MM3 (ref 1.7–7)
NEUTROPHILS NFR BLD AUTO: 44.8 % (ref 42.7–76)
NRBC BLD AUTO-RTO: 0 /100 WBC (ref 0–0.2)
PLATELET # BLD AUTO: 200 10*3/MM3 (ref 140–450)
PMV BLD AUTO: 13.3 FL (ref 6–12)
RBC # BLD AUTO: 5.3 10*6/MM3 (ref 3.77–5.28)
WBC NRBC COR # BLD: 4.42 10*3/MM3 (ref 3.4–10.8)

## 2022-02-16 PROCEDURE — 85025 COMPLETE CBC W/AUTO DIFF WBC: CPT

## 2022-02-16 PROCEDURE — 99213 OFFICE O/P EST LOW 20 MIN: CPT | Performed by: INTERNAL MEDICINE

## 2022-02-16 PROCEDURE — 36415 COLL VENOUS BLD VENIPUNCTURE: CPT

## 2022-02-16 NOTE — PROGRESS NOTES
Subjective . No SYMPTOMS    REASONS FOR FOLLOW UP:  LEUKOPENIA AND LYMPHOCYTOSIS.MARGINAL LOW B12 LEVEL.mild thrombocytopenia    HISTORY OF PRESENT ILLNESS:   DURING THE VISIT WITH THE PATIENT TODAY , PATIENT HAD FACE MASK, I HAD PROPER PROTECTIVE EQUIPMENT, AND I DID HAND HYGIENE WITH SOAP AND WATER BEFORE AND AFTER THE VISIT.    The patient returns today to the office for follow up of previous history of minor leukopenia and thrombocytopenia without any other alterations to be reviewed. She states today that she has had a very mild case of COVID infection that did not amount to anything. She was vaccinated and boostered and received the booster before all this infection. She is very much over the situation. Other than that, she has a very good appetite, good bowel activity, normal urination. No cough, shortness of breath, chest pain or palpitations. No bone or joint pain. No rashes in the skin. She remains working full-time with no difficulties.                 Past Medical History:   Diagnosis Date   • Benign heart murmur    • Hypertension    • Hypothyroidism    • Leukopenia    • Osteopenia    • Sickle cell anemia without crisis (HCC)     SICKLE CELL TRAIT ONLY     Past Surgical History:   Procedure Laterality Date   • BILATERAL BREAST REDUCTION Bilateral 2013   • COLONOSCOPY  01/2021   • HYSTERECTOMY  1997           MEDICATIONS    Current Outpatient Medications:   •  ALPRAZolam (Xanax) 0.5 MG tablet, Take 0.5 tablets by mouth 1 (One) Time As Needed for Anxiety (30 minutes before test, and must have a ) for up to 1 dose., Disp: 1 tablet, Rfl: 0  •  amLODIPine (NORVASC) 10 MG tablet, TAKE 1 TABLET BY MOUTH DAILY, Disp: 90 tablet, Rfl: 1  •  aspirin 81 MG EC tablet, Take 81 mg by mouth daily., Disp: , Rfl:   •  buPROPion XL (WELLBUTRIN XL) 300 MG 24 hr tablet, TAKE 1 TABLET(300 MG) BY MOUTH EVERY DAY, Disp: 90 tablet, Rfl: 1  •  busPIRone (BUSPAR) 10 MG tablet, Take 1 tablet by mouth 3 (Three) Times a Day.,  Disp: 180 tablet, Rfl: 1  •  CALCIUM PO, Take 600 mg by mouth Daily., Disp: , Rfl:   •  cloNIDine (CATAPRES) 0.3 MG tablet, TAKE 1 TABLET BY MOUTH DAILY, Disp: 90 tablet, Rfl: 1  •  Cyanocobalamin (B-12 PO), Take 1,000 mg by mouth Daily., Disp: , Rfl:   •  fosinopril (MONOPRIL) 40 MG tablet, TAKE 1 TABLET BY MOUTH DAILY, Disp: 90 tablet, Rfl: 1  •  hydroCHLOROthiazide (HYDRODIURIL) 50 MG tablet, TAKE 1 TABLET BY MOUTH DAILY, Disp: 90 tablet, Rfl: 1  •  levothyroxine (SYNTHROID, LEVOTHROID) 150 MCG tablet, TAKE 1 TABLET BY MOUTH DAILY, Disp: 90 tablet, Rfl: 1  •  LORazepam (Ativan) 0.5 MG tablet, Take 1 tablet by mouth Every 8 (Eight) Hours As Needed for Anxiety. Take 30 min to an hour before procedure.  Take someone to drive you., Disp: 1 tablet, Rfl: 0  •  Occlusive Silicone Sheets (Silicone Scar Sheets) sheet, 1 application Daily., Disp: 1 each, Rfl: 2  •  phentermine (ADIPEX-P) 37.5 MG tablet, Take 37.5 mg by mouth Every Other Day., Disp: , Rfl: 0  •  potassium chloride 10 MEQ CR tablet, Take 1 tablet by mouth Daily., Disp: 90 tablet, Rfl: 1  •  topiramate (TOPAMAX) 50 MG tablet, Take 50 mg by mouth 2 (Two) Times a Day., Disp: , Rfl:     ALLERGIES:     Allergies   Allergen Reactions   • Penicillins Unknown - Low Severity   • Morphine GI Intolerance       SOCIAL HISTORY:       Social History     Socioeconomic History   • Marital status:    • Number of children: 0   • Years of education: COLLEGE   Tobacco Use   • Smoking status: Former Smoker     Packs/day: 0.30     Types: Cigarettes     Quit date: 2016     Years since quittin.8   • Smokeless tobacco: Never Used   • Tobacco comment: Quit 16   Vaping Use   • Vaping Use: Never used   Substance and Sexual Activity   • Alcohol use: No   • Drug use: No   • Sexual activity: Yes     Partners: Male         FAMILY HISTORY:  Family History   Problem Relation Age of Onset   • Hypertension Mother    • Diabetes Mother    • Breast cancer Mother 65   • COPD  "Mother    • Arthritis Mother    • Heart disease Mother    • Alcohol abuse Father    • Cirrhosis Father    • Hypertension Sister    • Fibromyalgia Sister    • Diabetes Sister    • Stroke Sister    • Aneurysm Sister    • Hypertension Brother    • Heart disease Brother    • Diabetes Brother    • Sarcoidosis Sister          Objective    Vitals:    02/16/22 1616   BP: 125/85   Pulse: 102   Resp: 17   Temp: 97.5 °F (36.4 °C)   TempSrc: Temporal   SpO2: 100%   Weight: 89.4 kg (197 lb)   Height: 167.6 cm (65.98\")   PainSc: 0-No pain     Current Status 2/16/2022   ECOG score 0      PHYSICAL EXAM:  I HAVE PERSONALLY REVIEWED THE HISTORY OF THE PRESENT ILLNESS, PAST MEDICAL HISTORY, FAMILY HISTORY, SOCIAL HISTORY, ALLERGIES, MEDICATIONS STATED ABOVE IN THE  NOTE FROM TODAY.        GENERAL:  Well-developed, well-nourished  Patient  in no acute distress.   SKIN:  Warm, dry ,NO rashes,NO purpura ,NO petechiae.  HEENT:  Pupils were equal and reactive to light and accomodation, conjunctivae noninjected, no pterygium, normal extraocular movements, normal visual acuity.   NECK:  Supple with good range of motion; no thyromegaly , no other masses, no JVD or bruits, no cervical adenopathies.No carotid artery pain, no carotid abnormal pulsation , NO arterial dance.  LYMPHATICS:  No cervical, NO supraclavicular, NO axillary,NO epitrochlear , NO inguinal adenopathy.  CARDIAC   normal rate and regular rhythm, with SYSTOLIC GRADE 2/6 BASAL murmur,NO rubs NO S3 NO S4 right or left .  LUNGS: normal breath sounds bilateral, no wheezing, rhonchi, crackles or rubs.  VASCULAR VENOUS: no cyanosis, collateral circulation, varicosities, edema, palpable cords, pain, erythema.  ABDOMEN:  Soft, nontender with no hepatomegaly, no splenomegaly,no masses, no ascites, no collateral circulation,no distention,no Stanfield sign.  EXTREMITIES  AND SPINE:  No clubbing, cyanosis or edema, no deformities , no pain .No kyphosis, scoliosis, no other deformities, no " pain in spine, no pain in ribs , no pain inpelvic bone.  NEUROLOGICAL:  Patient was awake, alert, oriented to time, person and place.              RECENT LABS:  Units 16:14   (2/16/22) 2 mo ago   (12/10/21) 5 mo ago   (8/23/21) 10 mo ago   (4/9/21) 1 yr ago   (2/4/21) 1 yr ago   (1/4/21) 2 yr ago   (1/16/20)   WBC   3.40 - 10.80 10*3/mm3 4.42  3.53  2.46 Low   2.91 Low  R  3.58  3.14 Low  R  4.33    RBC   3.77 - 5.28 10*6/mm3 5.30 High   4.89  4.46  4.58 R  4.91  4.71 R  4.93    Hemoglobin   12.0 - 15.9 g/dL 14.6  13.7  12.6  12.7 R  14.1  12.9 R  13.8    Hematocrit   34.0 - 46.6 % 44.4  41.7  37.4  39.3 R  40.9  40.5 R  41.7    MCV   79.0 - 97.0 fL 83.8  85.3  83.9  85.8 R  83.3  86.0 R  84.6    MCH   26.6 - 33.0 pg 27.5  28.0  28.3  27.7 R  28.7  27.4 R  28.0    MCHC   31.5 - 35.7 g/dL 32.9  32.9  33.7  32.3 Low  R  34.5  31.9 Low  R  33.1    RDW   12.3 - 15.4 % 12.1 Low   12.5  12.1 Low   12.3 R  12.6  13.4 R  12.9    RDW-SD   37.0 - 54.0 fl 36.4 Low   38.8  36.4 Low   38.4 R  38.2  41.8 R  39.4    MPV   6.0 - 12.0 fL 13.3 High   13.5 High   13.8 High   see below R, CM  13.4 High   14.5 High  R  13.3 High     Platelets   140 - 450 10*3/mm3 200  180  164  159 R  159  167 R  111 Low     Neutrophil %   42.7 - 76.0 % 44.8  49.6  43.9  44.4 R  41.0 Low   38.8 R  41.0 Low     Lymphocyte %   19.6 - 45.3 % 40.0  35.4  41.1  41.9 R  45.3  47.5 High  R  48.3 High     Monocyte %   5.0 - 12.0 % 10.9  11.3  11.0  10.3 High  R  11.2  10.5 High  R  9.5    Eosinophil %   0.3 - 6.2 % 2.7  2.3  2.0  2.1 R  1.1  1.9 R  0.5    Basophil %   0.0 - 1.5 % 0.9  1.1  2.0 High   1.0 R  0.8  1.3 R  0.5    Immature Grans %   0.0 - 0.5 % 0.7 High   0.3   0.3 R  0.6 High   0.0 R  0.2    Neutrophils, Absolute   1.70 - 7.00 10*3/mm3 1.98  1.75  1.08 Low   1.29 Low  R  1.47 Low   1.22 Low  R  1.78    Lymphocytes, Absolute   0.70 - 3.10 10*3/mm3 1.77  1.25  1.01  1.22 R  1.62  1.49 R  2.09    Monocytes, Absolute   0.10 - 0.90 10*3/mm3 0.48  0.40   0.27  0.30 R  0.40  0.33 R  0.41    Eosinophils, Absolute   0.00 - 0.40 10*3/mm3 0.12  0.08  0.05  0.06 R  0.04  0.06 R  0.02    Basophils, Absolute   0.00 - 0.20 10*3/mm3 0.04  0.04  0.05  0.03 R  0.03  0.04 R  0.02    Immature Grans, Absolute   0.00 - 0.05 10*3/mm3 0.03  0.01    0.02   0.01    nRBC   0.0 - 0.2 /100 WBC 0.0  0.0    0.0   0.0    Resulting Agency  [facility]  [facility]  [facility]   [facility]   [facility]          Assessment/Plan    This patient has minimal leukopenia with lymphocytosis, otherwise asymptomatic, and is very likely that this is representation of leukopenia of .  We have tested this patient during the original consultation for hemoglobinopathy, having a normal hemoglobin electrophoresis.  Also the patient had a normal ferritin, normal iron profile, normal folic acid level and a marginally low B12 level.  The patient had serum protein electrophoresis and immunofixation that were negative for monoclonal proteins.  Also the patient had peripheral blood flow cytometry that failed to document any alteration of monoclonality in regard to her lymphocyte population.    The patient returned to the office on 02/04/2021 with no symptoms of anything. Her physical exam was unremarkable. Her white count, platelet count and hemoglobin remain about the same as before. She is up to date in colonoscopy and mammogram. Her overall health remains excellent. I have not advised her to change anything and I would like to review her back in a year.    The patient was reviewed on 02/16/2022. She is completely asymptomatic. Her clinical examination only shows a minimal systolic murmur at the base, grade 2, with no implications that she has had for a long time. She has seen her cardiologist last year. The rest of her clinical exam is normal. She is asymptomatic. Her white count, hemoglobin and platelets are back to normal at this time. She still has minimal peripheral lymphocytosis. This has been  studied in the past and has not had any implications.     RECOMMENDATIONS:  I would like to review her back in a year with a CBC and a CMP. I have not prescribed any medicines, neither modified any other medicines or requested any other radiological analysis.    ·

## 2022-03-01 ENCOUNTER — OFFICE VISIT (OUTPATIENT)
Dept: PLASTIC SURGERY | Facility: CLINIC | Age: 62
End: 2022-03-01

## 2022-03-01 VITALS
HEART RATE: 76 BPM | SYSTOLIC BLOOD PRESSURE: 128 MMHG | DIASTOLIC BLOOD PRESSURE: 85 MMHG | BODY MASS INDEX: 32.08 KG/M2 | WEIGHT: 199.6 LBS | OXYGEN SATURATION: 99 % | HEIGHT: 66 IN

## 2022-03-01 DIAGNOSIS — L91.0 KELOID: Primary | ICD-10-CM

## 2022-03-01 PROCEDURE — 99212 OFFICE O/P EST SF 10 MIN: CPT | Performed by: NURSE PRACTITIONER

## 2022-05-02 ENCOUNTER — OFFICE VISIT (OUTPATIENT)
Dept: FAMILY MEDICINE CLINIC | Facility: CLINIC | Age: 62
End: 2022-05-02

## 2022-05-02 ENCOUNTER — LAB (OUTPATIENT)
Dept: LAB | Facility: HOSPITAL | Age: 62
End: 2022-05-02

## 2022-05-02 VITALS
TEMPERATURE: 97.3 F | WEIGHT: 206.6 LBS | BODY MASS INDEX: 33.2 KG/M2 | HEIGHT: 66 IN | SYSTOLIC BLOOD PRESSURE: 142 MMHG | HEART RATE: 91 BPM | DIASTOLIC BLOOD PRESSURE: 70 MMHG | RESPIRATION RATE: 16 BRPM | OXYGEN SATURATION: 97 %

## 2022-05-02 DIAGNOSIS — Z23 NEED FOR SHINGLES VACCINE: ICD-10-CM

## 2022-05-02 DIAGNOSIS — Z51.81 MEDICATION MONITORING ENCOUNTER: ICD-10-CM

## 2022-05-02 DIAGNOSIS — E07.9 DISORDER OF THYROID: ICD-10-CM

## 2022-05-02 DIAGNOSIS — Z11.59 NEED FOR HEPATITIS C SCREENING TEST: ICD-10-CM

## 2022-05-02 DIAGNOSIS — D64.9 ANEMIA, UNSPECIFIED TYPE: ICD-10-CM

## 2022-05-02 DIAGNOSIS — Z78.0 POST-MENOPAUSE: ICD-10-CM

## 2022-05-02 DIAGNOSIS — Z00.00 ANNUAL PHYSICAL EXAM: ICD-10-CM

## 2022-05-02 DIAGNOSIS — I10 PRIMARY HYPERTENSION: ICD-10-CM

## 2022-05-02 DIAGNOSIS — Z23 NEED FOR TDAP VACCINATION: ICD-10-CM

## 2022-05-02 DIAGNOSIS — Z12.31 ENCOUNTER FOR SCREENING MAMMOGRAM FOR MALIGNANT NEOPLASM OF BREAST: Primary | ICD-10-CM

## 2022-05-02 LAB
25(OH)D3 SERPL-MCNC: 24.4 NG/ML (ref 30–100)
ALBUMIN SERPL-MCNC: 4 G/DL (ref 3.5–5.2)
ALBUMIN/GLOB SERPL: 1.6 G/DL
ALP SERPL-CCNC: 57 U/L (ref 39–117)
ALT SERPL W P-5'-P-CCNC: 18 U/L (ref 1–33)
AMORPH URATE CRY URNS QL MICRO: ABNORMAL /HPF
ANION GAP SERPL CALCULATED.3IONS-SCNC: 11.8 MMOL/L (ref 5–15)
AST SERPL-CCNC: 19 U/L (ref 1–32)
BACTERIA UR QL AUTO: ABNORMAL /HPF
BASOPHILS # BLD AUTO: 0.03 10*3/MM3 (ref 0–0.2)
BASOPHILS NFR BLD AUTO: 1 % (ref 0–1.5)
BILIRUB SERPL-MCNC: 0.3 MG/DL (ref 0–1.2)
BILIRUB UR QL STRIP: NEGATIVE
BUN SERPL-MCNC: 12 MG/DL (ref 8–23)
BUN/CREAT SERPL: 12.9 (ref 7–25)
CALCIUM SPEC-SCNC: 9.4 MG/DL (ref 8.6–10.5)
CHLORIDE SERPL-SCNC: 104 MMOL/L (ref 98–107)
CHOLEST SERPL-MCNC: 178 MG/DL (ref 0–200)
CLARITY UR: ABNORMAL
CO2 SERPL-SCNC: 26.2 MMOL/L (ref 22–29)
COD CRY URNS QL: ABNORMAL /HPF
COLOR UR: ABNORMAL
CREAT SERPL-MCNC: 0.93 MG/DL (ref 0.57–1)
DEPRECATED RDW RBC AUTO: 37.7 FL (ref 37–54)
EGFRCR SERPLBLD CKD-EPI 2021: 70.1 ML/MIN/1.73
EOSINOPHIL # BLD AUTO: 0.06 10*3/MM3 (ref 0–0.4)
EOSINOPHIL NFR BLD AUTO: 2 % (ref 0.3–6.2)
ERYTHROCYTE [DISTWIDTH] IN BLOOD BY AUTOMATED COUNT: 12 % (ref 12.3–15.4)
FERRITIN SERPL-MCNC: 133 NG/ML (ref 13–150)
FOLATE SERPL-MCNC: 10.5 NG/ML (ref 4.78–24.2)
GLOBULIN UR ELPH-MCNC: 2.5 GM/DL
GLUCOSE SERPL-MCNC: 77 MG/DL (ref 65–99)
GLUCOSE UR STRIP-MCNC: NEGATIVE MG/DL
HCT VFR BLD AUTO: 40.3 % (ref 34–46.6)
HCV AB SER DONR QL: NORMAL
HDLC SERPL QL: 3.42
HDLC SERPL-MCNC: 52 MG/DL (ref 40–60)
HGB BLD-MCNC: 13.2 G/DL (ref 12–15.9)
HGB UR QL STRIP.AUTO: NEGATIVE
HYALINE CASTS UR QL AUTO: ABNORMAL /LPF
KETONES UR QL STRIP: ABNORMAL
LDLC SERPL CALC-MCNC: 112 MG/DL (ref 0–100)
LEUKOCYTE ESTERASE UR QL STRIP.AUTO: ABNORMAL
LYMPHOCYTES # BLD AUTO: 1.09 10*3/MM3 (ref 0.7–3.1)
LYMPHOCYTES NFR BLD AUTO: 35.9 % (ref 19.6–45.3)
MCH RBC QN AUTO: 28 PG (ref 26.6–33)
MCHC RBC AUTO-ENTMCNC: 32.8 G/DL (ref 31.5–35.7)
MCV RBC AUTO: 85.6 FL (ref 79–97)
MONOCYTES # BLD AUTO: 0.33 10*3/MM3 (ref 0.1–0.9)
MONOCYTES NFR BLD AUTO: 10.9 % (ref 5–12)
NEUTROPHILS NFR BLD AUTO: 1.53 10*3/MM3 (ref 1.7–7)
NEUTROPHILS NFR BLD AUTO: 50.2 % (ref 42.7–76)
NITRITE UR QL STRIP: NEGATIVE
PH UR STRIP.AUTO: 5.5 [PH] (ref 5–8)
PLATELET # BLD AUTO: 170 10*3/MM3 (ref 140–450)
PMV BLD AUTO: 12.8 FL (ref 6–12)
POTASSIUM SERPL-SCNC: 4 MMOL/L (ref 3.5–5.2)
PROT SERPL-MCNC: 6.5 G/DL (ref 6–8.5)
PROT UR QL STRIP: ABNORMAL
RBC # BLD AUTO: 4.71 10*6/MM3 (ref 3.77–5.28)
RBC # UR STRIP: ABNORMAL /HPF
REF LAB TEST METHOD: ABNORMAL
SODIUM SERPL-SCNC: 142 MMOL/L (ref 136–145)
SP GR UR STRIP: >=1.03 (ref 1–1.03)
SQUAMOUS #/AREA URNS HPF: ABNORMAL /HPF
TRIGL SERPL-MCNC: 77 MG/DL (ref 0–150)
TSH SERPL DL<=0.05 MIU/L-ACNC: 0.49 UIU/ML (ref 0.27–4.2)
UROBILINOGEN UR QL STRIP: ABNORMAL
VIT B12 BLD-MCNC: >2000 PG/ML (ref 211–946)
VLDLC SERPL-MCNC: 14 MG/DL (ref 5–40)
WBC # UR STRIP: ABNORMAL /HPF
WBC NRBC COR # BLD: 3.04 10*3/MM3 (ref 3.4–10.8)

## 2022-05-02 PROCEDURE — 82607 VITAMIN B-12: CPT

## 2022-05-02 PROCEDURE — 86803 HEPATITIS C AB TEST: CPT

## 2022-05-02 PROCEDURE — 99396 PREV VISIT EST AGE 40-64: CPT | Performed by: NURSE PRACTITIONER

## 2022-05-02 PROCEDURE — 99214 OFFICE O/P EST MOD 30 MIN: CPT | Performed by: NURSE PRACTITIONER

## 2022-05-02 PROCEDURE — 80061 LIPID PANEL: CPT

## 2022-05-02 PROCEDURE — 82728 ASSAY OF FERRITIN: CPT

## 2022-05-02 PROCEDURE — 82746 ASSAY OF FOLIC ACID SERUM: CPT

## 2022-05-02 PROCEDURE — 36415 COLL VENOUS BLD VENIPUNCTURE: CPT

## 2022-05-02 PROCEDURE — 82306 VITAMIN D 25 HYDROXY: CPT

## 2022-05-02 PROCEDURE — 81001 URINALYSIS AUTO W/SCOPE: CPT

## 2022-05-02 PROCEDURE — 80050 GENERAL HEALTH PANEL: CPT

## 2022-05-02 RX ORDER — TETANUS TOXOID, REDUCED DIPHTHERIA TOXOID AND ACELLULAR PERTUSSIS VACCINE, ADSORBED 5; 2.5; 8; 8; 2.5 [IU]/.5ML; [IU]/.5ML; UG/.5ML; UG/.5ML; UG/.5ML
0.5 SUSPENSION INTRAMUSCULAR ONCE
Qty: 0.5 ML | Refills: 0 | Status: SHIPPED | OUTPATIENT
Start: 2022-05-02 | End: 2022-05-02

## 2022-05-04 DIAGNOSIS — D72.819 LEUKOPENIA, UNSPECIFIED TYPE: Primary | ICD-10-CM

## 2022-05-09 DIAGNOSIS — F41.9 ANXIETY: ICD-10-CM

## 2022-05-09 RX ORDER — BUSPIRONE HYDROCHLORIDE 10 MG/1
TABLET ORAL
Qty: 180 TABLET | Refills: 1 | Status: SHIPPED | OUTPATIENT
Start: 2022-05-09 | End: 2022-09-12

## 2022-06-01 NOTE — PROGRESS NOTES
"Chief Complaint  Follow-up (3 month follow up-keloid)    Subjective          History of Present Illness  Festus French is a 62 y.o. female who presents to [facility] for evaluation of keloid.    Doing well. No tenderness. Healing well. Area is much softer and feels better.        Allergies: Penicillins and Morphine  Allergies Reconciled.    Review of Systems     Objective     /81   Pulse 71   Temp 98.4 °F (36.9 °C) (Temporal)   Ht 167.6 cm (65.98\")   BMI 33.36 kg/m²     Body mass index is 33.36 kg/m².    Physical Exam   Cardiovascular: Normal rate.     Pulmonary/Chest  Effort normal.      Keloid scar improved, soft, flat, and pink, no tenderness    Result Review :              Assessment and Plan      Diagnoses and all orders for this visit:    1. Keloid (Primary)        Plan:  I counseled her on continued use of silicone sheets and we'll see her back in 3 months just to monitor. Site looks much improved and is not painful.       Follow Up     Return if symptoms worsen or fail to improve.    Patient was given instructions and counseling regarding her condition. Please see specific information pulled into the AVS if appropriate.     Yokasta Combs, APRN  06/06/2022  "

## 2022-06-06 ENCOUNTER — OFFICE VISIT (OUTPATIENT)
Dept: PLASTIC SURGERY | Facility: CLINIC | Age: 62
End: 2022-06-06

## 2022-06-06 VITALS
BODY MASS INDEX: 33.36 KG/M2 | HEIGHT: 66 IN | DIASTOLIC BLOOD PRESSURE: 81 MMHG | HEART RATE: 71 BPM | TEMPERATURE: 98.4 F | SYSTOLIC BLOOD PRESSURE: 119 MMHG

## 2022-06-06 DIAGNOSIS — L91.0 KELOID: Primary | ICD-10-CM

## 2022-06-06 PROCEDURE — 99212 OFFICE O/P EST SF 10 MIN: CPT | Performed by: NURSE PRACTITIONER

## 2022-06-06 RX ORDER — TOPIRAMATE 25 MG/1
TABLET ORAL
COMMUNITY
Start: 2022-06-01

## 2022-07-12 DIAGNOSIS — I10 ESSENTIAL HYPERTENSION: ICD-10-CM

## 2022-07-12 RX ORDER — POTASSIUM CHLORIDE 750 MG/1
10 TABLET, FILM COATED, EXTENDED RELEASE ORAL DAILY
Qty: 90 TABLET | Refills: 1 | Status: SHIPPED | OUTPATIENT
Start: 2022-07-12 | End: 2023-01-03

## 2022-08-13 DIAGNOSIS — I10 ESSENTIAL HYPERTENSION: ICD-10-CM

## 2022-08-13 DIAGNOSIS — E07.9 DISORDER OF THYROID: ICD-10-CM

## 2022-08-15 RX ORDER — AMLODIPINE BESYLATE 10 MG/1
10 TABLET ORAL DAILY
Qty: 90 TABLET | Refills: 1 | Status: SHIPPED | OUTPATIENT
Start: 2022-08-15 | End: 2023-02-09

## 2022-08-15 RX ORDER — LEVOTHYROXINE SODIUM 0.15 MG/1
150 TABLET ORAL DAILY
Qty: 90 TABLET | Refills: 1 | Status: SHIPPED | OUTPATIENT
Start: 2022-08-15 | End: 2022-11-02 | Stop reason: SDUPTHER

## 2022-08-15 RX ORDER — CLONIDINE HYDROCHLORIDE 0.3 MG/1
0.3 TABLET ORAL DAILY
Qty: 90 TABLET | Refills: 1 | Status: SHIPPED | OUTPATIENT
Start: 2022-08-15 | End: 2023-02-09

## 2022-08-15 RX ORDER — HYDROCHLOROTHIAZIDE 50 MG/1
50 TABLET ORAL DAILY
Qty: 90 TABLET | Refills: 1 | Status: SHIPPED | OUTPATIENT
Start: 2022-08-15 | End: 2023-02-09

## 2022-08-15 RX ORDER — FOSINOPRIL SODIUM 40 MG/1
40 TABLET ORAL DAILY
Qty: 90 TABLET | Refills: 1 | Status: SHIPPED | OUTPATIENT
Start: 2022-08-15

## 2022-08-17 ENCOUNTER — APPOINTMENT (OUTPATIENT)
Dept: BONE DENSITY | Facility: HOSPITAL | Age: 62
End: 2022-08-17

## 2022-08-17 ENCOUNTER — APPOINTMENT (OUTPATIENT)
Dept: MAMMOGRAPHY | Facility: HOSPITAL | Age: 62
End: 2022-08-17

## 2022-09-02 DIAGNOSIS — F41.9 ANXIETY: ICD-10-CM

## 2022-09-02 RX ORDER — BUPROPION HYDROCHLORIDE 300 MG/1
TABLET ORAL
Qty: 90 TABLET | Refills: 1 | Status: SHIPPED | OUTPATIENT
Start: 2022-09-02 | End: 2023-03-16

## 2022-09-10 DIAGNOSIS — F41.9 ANXIETY: ICD-10-CM

## 2022-09-12 RX ORDER — BUSPIRONE HYDROCHLORIDE 10 MG/1
TABLET ORAL
Qty: 180 TABLET | Refills: 1 | Status: SHIPPED | OUTPATIENT
Start: 2022-09-12

## 2022-09-27 ENCOUNTER — HOSPITAL ENCOUNTER (OUTPATIENT)
Dept: MAMMOGRAPHY | Facility: HOSPITAL | Age: 62
Discharge: HOME | End: 2022-09-27
Admitting: NURSE PRACTITIONER

## 2022-09-27 DIAGNOSIS — Z12.31 ENCOUNTER FOR SCREENING MAMMOGRAM FOR MALIGNANT NEOPLASM OF BREAST: ICD-10-CM

## 2022-09-27 PROCEDURE — 77067 SCR MAMMO BI INCL CAD: CPT

## 2022-09-27 PROCEDURE — 77063 BREAST TOMOSYNTHESIS BI: CPT

## 2022-09-28 DIAGNOSIS — Z12.31 ENCOUNTER FOR SCREENING MAMMOGRAM FOR MALIGNANT NEOPLASM OF BREAST: Primary | ICD-10-CM

## 2022-09-29 DIAGNOSIS — Z12.31 ENCOUNTER FOR SCREENING MAMMOGRAM FOR MALIGNANT NEOPLASM OF BREAST: Primary | ICD-10-CM

## 2022-10-26 ENCOUNTER — TELEPHONE (OUTPATIENT)
Dept: FAMILY MEDICINE CLINIC | Facility: CLINIC | Age: 62
End: 2022-10-26

## 2022-10-26 DIAGNOSIS — D64.9 ANEMIA, UNSPECIFIED TYPE: ICD-10-CM

## 2022-10-26 DIAGNOSIS — E07.9 DISORDER OF THYROID: ICD-10-CM

## 2022-10-26 DIAGNOSIS — I10 PRIMARY HYPERTENSION: ICD-10-CM

## 2022-10-26 DIAGNOSIS — D72.820 LYMPHOCYTOSIS: Primary | ICD-10-CM

## 2022-10-26 NOTE — TELEPHONE ENCOUNTER
Caller: Festus French    Relationship to patient: Self    Best call back number: [phone] OKAY TO LEAVE MESS    Patient is needing: PATIENT CALLED IN AND IS WANTING TO KNOW IF SHE NEEDS TO HAVE LABS AHEAD OF HER APPOINTMENT ON 11/2/2022. PATIENT IS REQUESTING A CALL BACK PLEASE.

## 2022-10-27 ENCOUNTER — LAB (OUTPATIENT)
Dept: LAB | Facility: HOSPITAL | Age: 62
End: 2022-10-27

## 2022-10-27 DIAGNOSIS — D72.819 LEUKOPENIA, UNSPECIFIED TYPE: ICD-10-CM

## 2022-10-27 DIAGNOSIS — E07.9 DISORDER OF THYROID: ICD-10-CM

## 2022-10-27 DIAGNOSIS — D72.820 LYMPHOCYTOSIS: ICD-10-CM

## 2022-10-27 DIAGNOSIS — I10 PRIMARY HYPERTENSION: ICD-10-CM

## 2022-10-27 DIAGNOSIS — D64.9 ANEMIA, UNSPECIFIED TYPE: ICD-10-CM

## 2022-10-27 LAB
ALBUMIN SERPL-MCNC: 4.1 G/DL (ref 3.5–5.2)
ALBUMIN/GLOB SERPL: 2 G/DL
ALP SERPL-CCNC: 60 U/L (ref 39–117)
ALT SERPL W P-5'-P-CCNC: 15 U/L (ref 1–33)
ANION GAP SERPL CALCULATED.3IONS-SCNC: 7.6 MMOL/L (ref 5–15)
AST SERPL-CCNC: 16 U/L (ref 1–32)
BACTERIA UR QL AUTO: ABNORMAL /HPF
BASOPHILS # BLD AUTO: 0.04 10*3/MM3 (ref 0–0.2)
BASOPHILS NFR BLD AUTO: 1.3 % (ref 0–1.5)
BILIRUB SERPL-MCNC: 0.2 MG/DL (ref 0–1.2)
BILIRUB UR QL STRIP: NEGATIVE
BUN SERPL-MCNC: 11 MG/DL (ref 8–23)
BUN/CREAT SERPL: 12.1 (ref 7–25)
CALCIUM SPEC-SCNC: 9.1 MG/DL (ref 8.6–10.5)
CHLORIDE SERPL-SCNC: 107 MMOL/L (ref 98–107)
CHOLEST SERPL-MCNC: 188 MG/DL (ref 0–200)
CLARITY UR: CLEAR
CO2 SERPL-SCNC: 29.4 MMOL/L (ref 22–29)
COLOR UR: YELLOW
CREAT SERPL-MCNC: 0.91 MG/DL (ref 0.57–1)
DEPRECATED RDW RBC AUTO: 40.1 FL (ref 37–54)
EGFRCR SERPLBLD CKD-EPI 2021: 71.9 ML/MIN/1.73
EOSINOPHIL # BLD AUTO: 0.09 10*3/MM3 (ref 0–0.4)
EOSINOPHIL NFR BLD AUTO: 2.8 % (ref 0.3–6.2)
ERYTHROCYTE [DISTWIDTH] IN BLOOD BY AUTOMATED COUNT: 12.9 % (ref 12.3–15.4)
FERRITIN SERPL-MCNC: 110 NG/ML (ref 13–150)
GLOBULIN UR ELPH-MCNC: 2.1 GM/DL
GLUCOSE SERPL-MCNC: 86 MG/DL (ref 65–99)
GLUCOSE UR STRIP-MCNC: NEGATIVE MG/DL
HCT VFR BLD AUTO: 41.2 % (ref 34–46.6)
HDLC SERPL QL: 3.3
HDLC SERPL-MCNC: 57 MG/DL (ref 40–60)
HGB BLD-MCNC: 13.4 G/DL (ref 12–15.9)
HGB UR QL STRIP.AUTO: ABNORMAL
HYALINE CASTS UR QL AUTO: ABNORMAL /LPF
IMM GRANULOCYTES # BLD AUTO: 0.01 10*3/MM3 (ref 0–0.05)
IMM GRANULOCYTES NFR BLD AUTO: 0.3 % (ref 0–0.5)
KETONES UR QL STRIP: ABNORMAL
LDLC SERPL CALC-MCNC: 119 MG/DL (ref 0–100)
LEUKOCYTE ESTERASE UR QL STRIP.AUTO: ABNORMAL
LYMPHOCYTES # BLD AUTO: 1.25 10*3/MM3 (ref 0.7–3.1)
LYMPHOCYTES NFR BLD AUTO: 39.2 % (ref 19.6–45.3)
MCH RBC QN AUTO: 27.7 PG (ref 26.6–33)
MCHC RBC AUTO-ENTMCNC: 32.5 G/DL (ref 31.5–35.7)
MCV RBC AUTO: 85.3 FL (ref 79–97)
MONOCYTES # BLD AUTO: 0.31 10*3/MM3 (ref 0.1–0.9)
MONOCYTES NFR BLD AUTO: 9.7 % (ref 5–12)
NEUTROPHILS NFR BLD AUTO: 1.49 10*3/MM3 (ref 1.7–7)
NEUTROPHILS NFR BLD AUTO: 46.7 % (ref 42.7–76)
NITRITE UR QL STRIP: NEGATIVE
NRBC BLD AUTO-RTO: 0 /100 WBC (ref 0–0.2)
PH UR STRIP.AUTO: 6.5 [PH] (ref 5–8)
PLATELET # BLD AUTO: 171 10*3/MM3 (ref 140–450)
PMV BLD AUTO: 13.1 FL (ref 6–12)
POTASSIUM SERPL-SCNC: 4 MMOL/L (ref 3.5–5.2)
PROT SERPL-MCNC: 6.2 G/DL (ref 6–8.5)
PROT UR QL STRIP: ABNORMAL
RBC # BLD AUTO: 4.83 10*6/MM3 (ref 3.77–5.28)
RBC # UR STRIP: ABNORMAL /HPF
REF LAB TEST METHOD: ABNORMAL
SODIUM SERPL-SCNC: 144 MMOL/L (ref 136–145)
SP GR UR STRIP: 1.03 (ref 1–1.03)
SQUAMOUS #/AREA URNS HPF: ABNORMAL /HPF
TRIGL SERPL-MCNC: 66 MG/DL (ref 0–150)
TSH SERPL DL<=0.05 MIU/L-ACNC: 4.22 UIU/ML (ref 0.27–4.2)
UROBILINOGEN UR QL STRIP: ABNORMAL
VLDLC SERPL-MCNC: 12 MG/DL (ref 5–40)
WBC # UR STRIP: ABNORMAL /HPF
WBC NRBC COR # BLD: 3.19 10*3/MM3 (ref 3.4–10.8)

## 2022-10-27 PROCEDURE — 80050 GENERAL HEALTH PANEL: CPT

## 2022-10-27 PROCEDURE — 81001 URINALYSIS AUTO W/SCOPE: CPT

## 2022-10-27 PROCEDURE — 82728 ASSAY OF FERRITIN: CPT

## 2022-10-27 PROCEDURE — 87086 URINE CULTURE/COLONY COUNT: CPT

## 2022-10-27 PROCEDURE — 80061 LIPID PANEL: CPT

## 2022-10-27 PROCEDURE — 36415 COLL VENOUS BLD VENIPUNCTURE: CPT

## 2022-10-28 LAB — BACTERIA SPEC AEROBE CULT: NO GROWTH

## 2022-11-02 ENCOUNTER — OFFICE VISIT (OUTPATIENT)
Dept: FAMILY MEDICINE CLINIC | Facility: CLINIC | Age: 62
End: 2022-11-02

## 2022-11-02 VITALS
HEART RATE: 92 BPM | HEIGHT: 66 IN | TEMPERATURE: 96.6 F | WEIGHT: 222.8 LBS | OXYGEN SATURATION: 98 % | BODY MASS INDEX: 35.81 KG/M2 | RESPIRATION RATE: 16 BRPM | DIASTOLIC BLOOD PRESSURE: 76 MMHG | SYSTOLIC BLOOD PRESSURE: 126 MMHG

## 2022-11-02 DIAGNOSIS — I10 PRIMARY HYPERTENSION: ICD-10-CM

## 2022-11-02 DIAGNOSIS — M25.522 ELBOW PAIN, LEFT: ICD-10-CM

## 2022-11-02 DIAGNOSIS — Z51.81 MEDICATION MONITORING ENCOUNTER: ICD-10-CM

## 2022-11-02 DIAGNOSIS — E66.09 CLASS 2 OBESITY DUE TO EXCESS CALORIES WITHOUT SERIOUS COMORBIDITY WITH BODY MASS INDEX (BMI) OF 35.0 TO 35.9 IN ADULT: ICD-10-CM

## 2022-11-02 DIAGNOSIS — E07.9 DISORDER OF THYROID: ICD-10-CM

## 2022-11-02 DIAGNOSIS — Z23 NEED FOR INFLUENZA VACCINATION: Primary | ICD-10-CM

## 2022-11-02 PROCEDURE — 90686 IIV4 VACC NO PRSV 0.5 ML IM: CPT | Performed by: NURSE PRACTITIONER

## 2022-11-02 PROCEDURE — 80305 DRUG TEST PRSMV DIR OPT OBS: CPT | Performed by: NURSE PRACTITIONER

## 2022-11-02 PROCEDURE — 99214 OFFICE O/P EST MOD 30 MIN: CPT | Performed by: NURSE PRACTITIONER

## 2022-11-02 PROCEDURE — 90471 IMMUNIZATION ADMIN: CPT | Performed by: NURSE PRACTITIONER

## 2022-11-02 RX ORDER — LEVOTHYROXINE SODIUM 175 UG/1
175 TABLET ORAL DAILY
Qty: 90 TABLET | Refills: 1 | Status: SHIPPED | OUTPATIENT
Start: 2022-11-02

## 2022-11-02 RX ORDER — PHENTERMINE HYDROCHLORIDE 37.5 MG/1
37.5 TABLET ORAL EVERY OTHER DAY
Qty: 30 TABLET | Refills: 0 | Status: SHIPPED | OUTPATIENT
Start: 2022-11-02 | End: 2022-12-01 | Stop reason: SDUPTHER

## 2022-11-02 NOTE — PROGRESS NOTES
Chief Complaint  Elbow Injury (Years ago, hurts when she picks something up), Hypertension, and Obesity    Subjective        Festus French presents to [facility]  History of Present Illness  Hypertension:      Elbow injury and fell years ago and hurt it.  States over the  Last few weeks noticed pain with certain movements.  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  But does the same every day.    Obesity:  Has been on phentermine in the past.  Would like to restart is aware of the risk of primary pulmonary hypertension.  Discussed Saxenda and denies family history of MEN2 and thyroid cancer.  Elbow Injury  Pertinent negatives include no chest pain, coughing, fever, numbness or weakness.   Hypertension  Pertinent negatives include no chest pain, palpitations or shortness of breath.   Obesity  Pertinent negatives include no chest pain, coughing, fever, numbness or weakness.         Past History:    Medical History: has a past medical history of Benign heart murmur, Hypertension, Hypothyroidism, Keloid, Leukopenia, Osteopenia, and Sickle cell anemia without crisis (HCC).     Surgical History: has a past surgical history that includes Breast Reduction (Bilateral, 2013); Hysterectomy (1997); and Colonoscopy (01/2021).     Family History: family history includes Alcohol abuse in her father; Aneurysm in her sister; Arthritis in her mother; Breast cancer (age of onset: 65) in her mother; COPD in her mother; Cirrhosis in her father; Diabetes in her brother, mother, and sister; Fibromyalgia in her sister; Heart disease in her brother and mother; Hypertension in her brother, mother, and sister; Sarcoidosis in her sister; Stroke in her sister.     Social History: reports that she quit smoking about 6 years ago. Her smoking use included cigarettes. She smoked an average of .3 packs per day. She has never used smokeless tobacco. She reports that she does not drink alcohol and does  "not use drugs.    Allergies: Penicillins and Morphine          Current Outpatient Medications:   •  amLODIPine (NORVASC) 10 MG tablet, TAKE 1 TABLET BY MOUTH DAILY, Disp: 90 tablet, Rfl: 1  •  buPROPion XL (WELLBUTRIN XL) 300 MG 24 hr tablet, TAKE 1 TABLET BY MOUTH DAILY, Disp: 90 tablet, Rfl: 1  •  busPIRone (BUSPAR) 10 MG tablet, TAKE 1 TABLET BY MOUTH THREE TIMES DAILY, Disp: 180 tablet, Rfl: 1  •  CALCIUM PO, Take 600 mg by mouth Daily., Disp: , Rfl:   •  cloNIDine (CATAPRES) 0.3 MG tablet, TAKE 1 TABLET BY MOUTH DAILY, Disp: 90 tablet, Rfl: 1  •  Cyanocobalamin (B-12 PO), Take 1,000 mg by mouth Daily., Disp: , Rfl:   •  hydroCHLOROthiazide (HYDRODIURIL) 50 MG tablet, TAKE 1 TABLET BY MOUTH DAILY, Disp: 90 tablet, Rfl: 1  •  levothyroxine (SYNTHROID, LEVOTHROID) 175 MCG tablet, Take 1 tablet by mouth Daily., Disp: 90 tablet, Rfl: 1  •  potassium chloride 10 MEQ CR tablet, TAKE 1 TABLET BY MOUTH DAILY, Disp: 90 tablet, Rfl: 1  •  fosinopril (MONOPRIL) 40 MG tablet, TAKE 1 TABLET BY MOUTH DAILY, Disp: 90 tablet, Rfl: 1  •  phentermine (ADIPEX-P) 37.5 MG tablet, Take 37.5 mg by mouth Every Other Day., Disp: , Rfl: 0  •  topiramate (TOPAMAX) 25 MG tablet, , Disp: , Rfl:     Medications Discontinued During This Encounter   Medication Reason   • levothyroxine (SYNTHROID, LEVOTHROID) 150 MCG tablet Reorder         Review of Systems   Constitutional: Negative for fever.   Respiratory: Negative for cough and shortness of breath.    Cardiovascular: Negative for chest pain, palpitations and leg swelling.   Neurological: Negative for dizziness, weakness, numbness and headache.        Objective         Vitals:    11/02/22 0732   BP: 126/76   BP Location: Right arm   Patient Position: Sitting   Cuff Size: Large Adult   Pulse: 92   Resp: 16   Temp: 96.6 °F (35.9 °C)   TempSrc: Infrared   SpO2: 98%   Weight: 101 kg (222 lb 12.8 oz)   Height: 167.6 cm (65.98\")     Body mass index is 35.98 kg/m².         Physical Exam  Vitals " reviewed.   Constitutional:       Appearance: Normal appearance. She is well-developed.   HENT:      Head: Normocephalic and atraumatic.      Mouth/Throat:      Pharynx: No oropharyngeal exudate.   Eyes:      Conjunctiva/sclera: Conjunctivae normal.      Pupils: Pupils are equal, round, and reactive to light.   Cardiovascular:      Rate and Rhythm: Normal rate and regular rhythm.      Heart sounds: Normal heart sounds. No murmur heard.    No friction rub. No gallop.   Pulmonary:      Effort: Pulmonary effort is normal.      Breath sounds: Normal breath sounds. No wheezing or rhonchi.   Musculoskeletal:        Arms:       Comments: Point tenderness to outer elbow.   Skin:     General: Skin is warm and dry.   Neurological:      Mental Status: She is alert and oriented to person, place, and time.   Psychiatric:         Mood and Affect: Mood and affect normal.         Behavior: Behavior normal.         Thought Content: Thought content normal.         Judgment: Judgment normal.             Result Review :               Assessment and Plan     Diagnoses and all orders for this visit:    1. Need for influenza vaccination (Primary)  -     FluLaval/Fluzone >6 mos ([id number])    2. Elbow pain, left  -     XR Elbow 3+ View Left; Future    3. Disorder of thyroid  Comments:  contineu levoithyroxine at current dose.  Orders:  -     levothyroxine (SYNTHROID, LEVOTHROID) 175 MCG tablet; Take 1 tablet by mouth Daily.  Dispense: 90 tablet; Refill: 1  -     TSH; Future  -     TSH; Future    4. Primary hypertension  -     Lipid Panel With / Chol / HDL Ratio; Future  -     Lipid Panel With / Chol / HDL Ratio; Future  -     Comprehensive Metabolic Panel; Future  -     Urinalysis With Culture If Indicated -; Future  -     CBC (No Diff); Future              Follow Up     Return in about 1 month (around 12/2/2022).    Patient was given instructions and counseling regarding her condition or for health maintenance advice. Please see specific  information pulled into the AVS if appropriate.

## 2022-11-15 ENCOUNTER — APPOINTMENT (OUTPATIENT)
Dept: BONE DENSITY | Facility: HOSPITAL | Age: 62
End: 2022-11-15

## 2022-11-22 ENCOUNTER — HOSPITAL ENCOUNTER (OUTPATIENT)
Dept: GENERAL RADIOLOGY | Facility: HOSPITAL | Age: 62
Discharge: HOME | End: 2022-11-22

## 2022-11-22 ENCOUNTER — HOSPITAL ENCOUNTER (OUTPATIENT)
Dept: BONE DENSITY | Facility: HOSPITAL | Age: 62
Discharge: HOME | End: 2022-11-22

## 2022-11-22 DIAGNOSIS — M25.522 ELBOW PAIN, LEFT: ICD-10-CM

## 2022-11-22 DIAGNOSIS — Z78.0 POST-MENOPAUSE: ICD-10-CM

## 2022-11-22 PROCEDURE — 73080 X-RAY EXAM OF ELBOW: CPT

## 2022-11-22 PROCEDURE — 77080 DXA BONE DENSITY AXIAL: CPT

## 2022-12-01 ENCOUNTER — OFFICE VISIT (OUTPATIENT)
Dept: FAMILY MEDICINE CLINIC | Facility: CLINIC | Age: 62
End: 2022-12-01

## 2022-12-01 VITALS
SYSTOLIC BLOOD PRESSURE: 136 MMHG | HEIGHT: 66 IN | BODY MASS INDEX: 34.46 KG/M2 | RESPIRATION RATE: 16 BRPM | DIASTOLIC BLOOD PRESSURE: 80 MMHG | OXYGEN SATURATION: 99 % | TEMPERATURE: 97.1 F | HEART RATE: 80 BPM | WEIGHT: 214.4 LBS

## 2022-12-01 DIAGNOSIS — E66.09 CLASS 1 OBESITY DUE TO EXCESS CALORIES WITHOUT SERIOUS COMORBIDITY WITH BODY MASS INDEX (BMI) OF 34.0 TO 34.9 IN ADULT: ICD-10-CM

## 2022-12-01 PROCEDURE — 99213 OFFICE O/P EST LOW 20 MIN: CPT | Performed by: NURSE PRACTITIONER

## 2022-12-01 RX ORDER — PHENTERMINE HYDROCHLORIDE 37.5 MG/1
37.5 TABLET ORAL EVERY OTHER DAY
Qty: 30 TABLET | Refills: 0 | Status: SHIPPED | OUTPATIENT
Start: 2022-12-01 | End: 2023-02-23

## 2022-12-01 NOTE — PROGRESS NOTES
Chief Complaint  Hypertension, Hypothyroidism, and Elbow Injury (Comes and goes, not wearing the brace all the time)    Subjective        Festus French presents to [facility]  History of Present Illness  Obesity:  Has been on phentermine in the past.  Would like to restart is aware of the risk of primary pulmonary hypertension.  Discussed Saxenda and denies family history of MEN2 and thyroid cancer.  Insurance will pay for Saxenda in January.  Denies chest pain or shortness of breath.  Elbow Injury  Pertinent negatives include no chest pain, coughing, fever, numbness or weakness.   Hypertension  Pertinent negatives include no chest pain, palpitations or shortness of breath.   Obesity  Pertinent negatives include no chest pain, coughing, fever, numbness or weakness.         Past History:    Medical History: has a past medical history of Allergic (Asa, Pcn/ childhood), Anxiety (2015), Arthritis (2020), Benign heart murmur, Colon polyp (2021), Diverticulosis (2021), HL (hearing loss) (1983), Hypertension, Hypothyroidism, Keloid, Leukopenia, Obesity (1990), Osteopenia, Sickle cell anemia (HCC) (1961), and Sickle cell anemia without crisis (HCC).     Surgical History: has a past surgical history that includes Breast Reduction (Bilateral, 2013); Hysterectomy (1997); Colonoscopy (01/2021); and Breast surgery (2015).     Family History: family history includes Alcohol abuse in her father; Aneurysm in her sister; Arthritis in her mother; Breast cancer (age of onset: 65) in her mother; COPD in her mother; Cirrhosis in her father; Diabetes in her brother, mother, and sister; Fibromyalgia in her sister; Heart disease in her brother and mother; Hypertension in her brother, mother, and sister; Sarcoidosis in her sister; Stroke in her sister.     Social History: reports that she quit smoking about 6 years ago. Her smoking use included cigarettes. She smoked an average of .3 packs per day. She has  "never used smokeless tobacco. She reports that she does not drink alcohol and does not use drugs.    Allergies: Penicillins and Morphine          Current Outpatient Medications:   •  amLODIPine (NORVASC) 10 MG tablet, TAKE 1 TABLET BY MOUTH DAILY, Disp: 90 tablet, Rfl: 1  •  buPROPion XL (WELLBUTRIN XL) 300 MG 24 hr tablet, TAKE 1 TABLET BY MOUTH DAILY, Disp: 90 tablet, Rfl: 1  •  busPIRone (BUSPAR) 10 MG tablet, TAKE 1 TABLET BY MOUTH THREE TIMES DAILY, Disp: 180 tablet, Rfl: 1  •  CALCIUM PO, Take 600 mg by mouth Daily., Disp: , Rfl:   •  cloNIDine (CATAPRES) 0.3 MG tablet, TAKE 1 TABLET BY MOUTH DAILY, Disp: 90 tablet, Rfl: 1  •  Cyanocobalamin (B-12 PO), Take 1,000 mg by mouth Daily., Disp: , Rfl:   •  fosinopril (MONOPRIL) 40 MG tablet, TAKE 1 TABLET BY MOUTH DAILY, Disp: 90 tablet, Rfl: 1  •  hydroCHLOROthiazide (HYDRODIURIL) 50 MG tablet, TAKE 1 TABLET BY MOUTH DAILY, Disp: 90 tablet, Rfl: 1  •  levothyroxine (SYNTHROID, LEVOTHROID) 175 MCG tablet, Take 1 tablet by mouth Daily., Disp: 90 tablet, Rfl: 1  •  phentermine (ADIPEX-P) 37.5 MG tablet, Take 1 tablet by mouth Every Other Day., Disp: 30 tablet, Rfl: 0  •  potassium chloride 10 MEQ CR tablet, TAKE 1 TABLET BY MOUTH DAILY, Disp: 90 tablet, Rfl: 1  •  topiramate (TOPAMAX) 25 MG tablet, , Disp: , Rfl:     Medications Discontinued During This Encounter   Medication Reason   • phentermine (ADIPEX-P) 37.5 MG tablet Reorder         Review of Systems   Constitutional: Negative for fever.   Respiratory: Negative for cough and shortness of breath.    Cardiovascular: Negative for chest pain and palpitations.   Neurological: Negative for weakness and numbness.        Objective         Vitals:    12/01/22 0751   BP: 136/80   BP Location: Right arm   Patient Position: Sitting   Cuff Size: Large Adult   Pulse: 80   Resp: 16   Temp: 97.1 °F (36.2 °C)   TempSrc: Temporal   SpO2: 99%   Weight: 97.3 kg (214 lb 6.4 oz)   Height: 167.6 cm (65.98\")     Body mass index is " 34.62 kg/m².         Physical Exam  Vitals reviewed.   Constitutional:       Appearance: Normal appearance. She is well-developed.   HENT:      Head: Normocephalic and atraumatic.      Mouth/Throat:      Pharynx: No oropharyngeal exudate.   Eyes:      Conjunctiva/sclera: Conjunctivae normal.      Pupils: Pupils are equal, round, and reactive to light.   Cardiovascular:      Rate and Rhythm: Normal rate and regular rhythm.      Heart sounds: Normal heart sounds. No murmur heard.    No friction rub. No gallop.   Pulmonary:      Effort: Pulmonary effort is normal.      Breath sounds: Normal breath sounds. No wheezing or rhonchi.   Skin:     General: Skin is warm and dry.   Neurological:      Mental Status: She is alert and oriented to person, place, and time.   Psychiatric:         Mood and Affect: Mood and affect normal.         Behavior: Behavior normal.         Thought Content: Thought content normal.         Judgment: Judgment normal.             Result Review :               Assessment and Plan     Diagnoses and all orders for this visit:    1. Class 1 obesity due to excess calories without serious comorbidity with body mass index (BMI) of 34.0 to 34.9 in adult  -     phentermine (ADIPEX-P) 37.5 MG tablet; Take 1 tablet by mouth Every Other Day.  Dispense: 30 tablet; Refill: 0              Follow Up     Return in about 1 month (around 1/1/2023).    Patient was given instructions and counseling regarding her condition or for health maintenance advice. Please see specific information pulled into the AVS if appropriate.         Answers for HPI/ROS submitted by the patient on 11/30/2022  Please describe your symptoms.: Weight loss  Have you had these symptoms before?: No  How long have you been having these symptoms?: Greater than 2 weeks  Please list any medications you are currently taking for this condition.: Same  Please describe any probable cause for these symptoms. : F/u  What is the primary reason for your  visit?: Other

## 2023-01-01 DIAGNOSIS — I10 ESSENTIAL HYPERTENSION: ICD-10-CM

## 2023-01-03 RX ORDER — POTASSIUM CHLORIDE 750 MG/1
10 TABLET, FILM COATED, EXTENDED RELEASE ORAL DAILY
Qty: 90 TABLET | Refills: 1 | Status: SHIPPED | OUTPATIENT
Start: 2023-01-03

## 2023-01-04 ENCOUNTER — OFFICE VISIT (OUTPATIENT)
Dept: FAMILY MEDICINE CLINIC | Facility: CLINIC | Age: 63
End: 2023-01-04
Payer: COMMERCIAL

## 2023-01-04 ENCOUNTER — TELEPHONE (OUTPATIENT)
Dept: FAMILY MEDICINE CLINIC | Facility: CLINIC | Age: 63
End: 2023-01-04
Payer: COMMERCIAL

## 2023-01-04 VITALS
BODY MASS INDEX: 35.23 KG/M2 | SYSTOLIC BLOOD PRESSURE: 132 MMHG | DIASTOLIC BLOOD PRESSURE: 86 MMHG | HEIGHT: 66 IN | WEIGHT: 219.2 LBS | RESPIRATION RATE: 16 BRPM | TEMPERATURE: 97.5 F | OXYGEN SATURATION: 98 % | HEART RATE: 97 BPM

## 2023-01-04 DIAGNOSIS — B37.2 YEAST DERMATITIS: ICD-10-CM

## 2023-01-04 DIAGNOSIS — E66.09 CLASS 2 OBESITY DUE TO EXCESS CALORIES WITHOUT SERIOUS COMORBIDITY WITH BODY MASS INDEX (BMI) OF 35.0 TO 35.9 IN ADULT: Primary | ICD-10-CM

## 2023-01-04 PROCEDURE — 99999 PR OFFICE/OUTPT VISIT,PROCEDURE ONLY: CPT | Performed by: NURSE PRACTITIONER

## 2023-01-04 PROCEDURE — 99213 OFFICE O/P EST LOW 20 MIN: CPT | Performed by: NURSE PRACTITIONER

## 2023-01-04 RX ORDER — SEMAGLUTIDE 0.5 MG/.5ML
0.5 INJECTION, SOLUTION SUBCUTANEOUS WEEKLY
Qty: 6 ML | Refills: 0 | Status: SHIPPED | OUTPATIENT
Start: 2023-01-04 | End: 2023-02-24

## 2023-01-04 RX ORDER — FLUCONAZOLE 150 MG/1
150 TABLET ORAL ONCE
Qty: 1 TABLET | Refills: 3 | Status: SHIPPED | OUTPATIENT
Start: 2023-01-04 | End: 2023-01-04

## 2023-01-04 RX ORDER — PEN NEEDLE, DIABETIC 30 GX3/16"
1 NEEDLE, DISPOSABLE MISCELLANEOUS WEEKLY
Qty: 30 EACH | Refills: 1 | Status: SHIPPED | OUTPATIENT
Start: 2023-01-04

## 2023-01-04 NOTE — PROGRESS NOTES
Answers for HPI/ROS submitted by the patient on 1/3/2023  Please describe your symptoms.: Follow up, weight loss  Have you had these symptoms before?: No  How long have you been having these symptoms?: 1-4 days  Please list any medications you are currently taking for this condition.: Same  Please describe any probable cause for these symptoms. : Weight loss  What is the primary reason for your visit?: Other    Chief Complaint  Obesity    Subjective        Festus French presents to [facility]  History of Present Illness  Obesity:  Has been on phentermine in the past.  Would like to restart is aware of the risk of primary pulmonary hypertension.  Discussed Saxenda and denies family history of MEN2 and thyroid cancer.  Insurance will pay for Saxenda in January.  Denies chest pain or shortness of breath.    wegovy is now in stock and on her formulary and didn't lose any weight with phentermine.  Obesity  Pertinent negatives include no chest pain, coughing, fever, numbness or weakness.   Elbow Injury  Pertinent negatives include no chest pain, coughing, fever, numbness or weakness.   Hypertension  Pertinent negatives include no chest pain, palpitations or shortness of breath.         Past History:    Medical History: has a past medical history of Allergic (Asa, Pcn/ childhood), Anxiety (2015), Arthritis (2020), Benign heart murmur, Colon polyp (2021), Diverticulosis (2021), HL (hearing loss) (1983), Hypertension, Hypothyroidism, Keloid, Leukopenia, Obesity (1990), Osteopenia, Sickle cell anemia (HCC) (1961), and Sickle cell anemia without crisis (HCC).     Surgical History: has a past surgical history that includes Breast Reduction (Bilateral, 2013); Hysterectomy (1997); Colonoscopy (01/2021); and Breast surgery (2015).     Family History: family history includes Alcohol abuse in her father; Aneurysm in her sister; Arthritis in her mother; Breast cancer (age of onset: 65) in her  mother; COPD in her mother; Cirrhosis in her father; Diabetes in her brother, mother, and sister; Fibromyalgia in her sister; Heart disease in her brother and mother; Hypertension in her brother, mother, and sister; Sarcoidosis in her sister; Stroke in her sister.     Social History: reports that she quit smoking about 6 years ago. Her smoking use included cigarettes. She smoked an average of .3 packs per day. She has never used smokeless tobacco. She reports that she does not drink alcohol and does not use drugs.    Allergies: Penicillins and Morphine          Current Outpatient Medications:   •  amLODIPine (NORVASC) 10 MG tablet, TAKE 1 TABLET BY MOUTH DAILY, Disp: 90 tablet, Rfl: 1  •  buPROPion XL (WELLBUTRIN XL) 300 MG 24 hr tablet, TAKE 1 TABLET BY MOUTH DAILY, Disp: 90 tablet, Rfl: 1  •  busPIRone (BUSPAR) 10 MG tablet, TAKE 1 TABLET BY MOUTH THREE TIMES DAILY, Disp: 180 tablet, Rfl: 1  •  CALCIUM PO, Take 600 mg by mouth Daily., Disp: , Rfl:   •  cloNIDine (CATAPRES) 0.3 MG tablet, TAKE 1 TABLET BY MOUTH DAILY, Disp: 90 tablet, Rfl: 1  •  Cyanocobalamin (B-12 PO), Take 1,000 mg by mouth Daily., Disp: , Rfl:   •  fosinopril (MONOPRIL) 40 MG tablet, TAKE 1 TABLET BY MOUTH DAILY, Disp: 90 tablet, Rfl: 1  •  hydroCHLOROthiazide (HYDRODIURIL) 50 MG tablet, TAKE 1 TABLET BY MOUTH DAILY, Disp: 90 tablet, Rfl: 1  •  levothyroxine (SYNTHROID, LEVOTHROID) 175 MCG tablet, Take 1 tablet by mouth Daily., Disp: 90 tablet, Rfl: 1  •  phentermine (ADIPEX-P) 37.5 MG tablet, Take 1 tablet by mouth Every Other Day., Disp: 30 tablet, Rfl: 0  •  potassium chloride 10 MEQ CR tablet, TAKE 1 TABLET BY MOUTH DAILY, Disp: 90 tablet, Rfl: 1  •  topiramate (TOPAMAX) 25 MG tablet, , Disp: , Rfl:   •  Insulin Pen Needle (Pen Needles) 32G X 4 MM misc, 1 each 1 (One) Time Per Week., Disp: 30 each, Rfl: 1  •  Semaglutide-Weight Management (Wegovy) 0.5 MG/0.5ML solution auto-injector, Inject 0.5 mg under the skin into the appropriate area as  directed 1 (One) Time Per Week., Disp: 6 mL, Rfl: 0  •  Semaglutide-Weight Management 0.25 MG/0.5ML solution auto-injector, Inject 0.25 mg under the skin into the appropriate area as directed 1 (One) Time Per Week., Disp: 0.5 mL, Rfl: 0    There are no discontinued medications.      Review of Systems   Constitutional: Negative for fever.   Respiratory: Negative for cough and shortness of breath.    Cardiovascular: Negative for chest pain and palpitations.   Neurological: Negative for weakness and numbness.        Objective         Vitals:    01/04/23 0725   BP: 132/86   BP Location: Right arm   Patient Position: Sitting   Cuff Size: Large Adult   Pulse: 97   Resp: 16   Temp: 97.5 °F (36.4 °C)   TempSrc: Temporal   SpO2: 98%   Weight: 99.4 kg (219 lb 3.2 oz)   Height: 167.6 cm (65.98\")     Body mass index is 35.4 kg/m².         Physical Exam  Vitals reviewed.   Constitutional:       Appearance: Normal appearance. She is well-developed.   HENT:      Head: Normocephalic and atraumatic.      Mouth/Throat:      Pharynx: No oropharyngeal exudate.   Eyes:      Conjunctiva/sclera: Conjunctivae normal.      Pupils: Pupils are equal, round, and reactive to light.   Cardiovascular:      Rate and Rhythm: Normal rate and regular rhythm.      Heart sounds: Normal heart sounds. No murmur heard.    No friction rub. No gallop.   Pulmonary:      Effort: Pulmonary effort is normal.      Breath sounds: Normal breath sounds. No wheezing or rhonchi.   Skin:     General: Skin is warm and dry.   Neurological:      Mental Status: She is alert and oriented to person, place, and time.   Psychiatric:         Mood and Affect: Mood and affect normal.         Behavior: Behavior normal.         Thought Content: Thought content normal.         Judgment: Judgment normal.             Result Review :               Assessment and Plan     Diagnoses and all orders for this visit:    1. Class 2 obesity due to excess calories without serious comorbidity  with body mass index (BMI) of 35.0 to 35.9 in adult (Primary)  -     Semaglutide-Weight Management 0.25 MG/0.5ML solution auto-injector; Inject 0.25 mg under the skin into the appropriate area as directed 1 (One) Time Per Week.  Dispense: 0.5 mL; Refill: 0  -     Semaglutide-Weight Management (Wegovy) 0.5 MG/0.5ML solution auto-injector; Inject 0.5 mg under the skin into the appropriate area as directed 1 (One) Time Per Week.  Dispense: 6 mL; Refill: 0  -     Insulin Pen Needle (Pen Needles) 32G X 4 MM misc; 1 each 1 (One) Time Per Week.  Dispense: 30 each; Refill: 1    2. Yeast dermatitis  -     fluconazole (Diflucan) 150 MG tablet; Take 1 tablet by mouth 1 (One) Time for 1 dose.  Dispense: 1 tablet; Refill: 3              Follow Up     Return in about 3 months (around 4/4/2023).    Patient was given instructions and counseling regarding her condition or for health maintenance advice. Please see specific information pulled into the AVS if appropriate.

## 2023-01-04 NOTE — TELEPHONE ENCOUNTER
Tawana from [facility] pharmacy calling to verify Wegovy prescription. They received the 0.5 dose for Wegovy. They are needing to know for insurance purposes if patient was ever on Wegovy 0.25. Insurance is currently not letting them fill the 0.5mg due to not having claim that patient was ever on 0.25mg dose.

## 2023-01-05 DIAGNOSIS — E66.09 CLASS 1 OBESITY DUE TO EXCESS CALORIES WITHOUT SERIOUS COMORBIDITY WITH BODY MASS INDEX (BMI) OF 34.0 TO 34.9 IN ADULT: ICD-10-CM

## 2023-01-05 RX ORDER — SEMAGLUTIDE 0.5 MG/.5ML
0.5 INJECTION, SOLUTION SUBCUTANEOUS WEEKLY
Qty: 6 ML | Refills: 0 | OUTPATIENT
Start: 2023-01-05

## 2023-01-05 NOTE — TELEPHONE ENCOUNTER
Caller: Festus French    Relationship: Self    Best call back number: [phone]     Requested Prescriptions:   Requested Prescriptions     Pending Prescriptions Disp Refills   • Semaglutide-Weight Management (Wegovy) 0.5 MG/0.5ML solution auto-injector 6 mL 0     Sig: Inject 0.5 mg under the skin into the appropriate area as directed 1 (One) Time Per Week.        Pharmacy where request should be sent: [id number] - [place], [place] - [address] [facility] AT [facility] OF [address] & [place] - [phone] [facility] [phone] FX     Additional details provided by patient:     MEDICATION IS NEEDING A PA TO BE ABLE TO BE FILLED     PLEASE ADVISE          ALSO PATIENT IS REQUESTING A ERNESTO BACK REGARDING MEDICATION  FLUCONAZOLE     PLEASE ADVISE       Would you like a call back once the refill request has been completed: [x] Yes [] No    If the office needs to give you a call back, can they leave a voicemail: [x] Yes [] No    Milvia Goss Rep   01/05/23 11:37 EST

## 2023-01-10 ENCOUNTER — TELEPHONE (OUTPATIENT)
Dept: FAMILY MEDICINE CLINIC | Facility: CLINIC | Age: 63
End: 2023-01-10
Payer: COMMERCIAL

## 2023-01-10 NOTE — TELEPHONE ENCOUNTER
Caller: Festus French    Relationship: Self    Best call back number: [phone]    What is the best time to reach you: ANYTIME    Who are you requesting to speak with (clinical staff, provider,  specific staff member): NURSE         What was the call regarding: PLEASE CALL PATIENT REGARDING THE WEGOVY, SHE IS  WANTING TO SEE IF THE PRE AUTHORIZATION HAS BEEN PUT IN.     Do you require a callback: YES

## 2023-01-13 ENCOUNTER — TELEPHONE (OUTPATIENT)
Dept: FAMILY MEDICINE CLINIC | Facility: CLINIC | Age: 63
End: 2023-01-13
Payer: COMMERCIAL

## 2023-01-13 NOTE — TELEPHONE ENCOUNTER
Pharmacy Name: [facility] - [place], [place] - [address] [facility] [address] - [phone] [facility] [phone]      Pharmacy representative name: SHORTY    Pharmacy representative phone number: [phone]--- REFERENCE KEY : [id number]    What medication are you calling in regards to: Semaglutide-Weight Management (Wegovy) 0.5 MG/0.5ML solution auto-injector    What question does the pharmacy have: CHECKING STATUS ON FAX SENT 1.5.2023 IN REGARDS TO APPEAL OF MEDICATION LISTED.     Who is the provider that prescribed the medication: MARGARET RANGEL

## 2023-02-09 DIAGNOSIS — E07.9 DISORDER OF THYROID: ICD-10-CM

## 2023-02-09 DIAGNOSIS — I10 ESSENTIAL HYPERTENSION: ICD-10-CM

## 2023-02-09 RX ORDER — CLONIDINE HYDROCHLORIDE 0.3 MG/1
0.3 TABLET ORAL DAILY
Qty: 90 TABLET | Refills: 1 | Status: SHIPPED | OUTPATIENT
Start: 2023-02-09

## 2023-02-09 RX ORDER — AMLODIPINE BESYLATE 10 MG/1
10 TABLET ORAL DAILY
Qty: 90 TABLET | Refills: 1 | Status: SHIPPED | OUTPATIENT
Start: 2023-02-09

## 2023-02-09 RX ORDER — HYDROCHLOROTHIAZIDE 50 MG/1
50 TABLET ORAL DAILY
Qty: 90 TABLET | Refills: 1 | Status: SHIPPED | OUTPATIENT
Start: 2023-02-09

## 2023-02-09 RX ORDER — LEVOTHYROXINE SODIUM 0.15 MG/1
150 TABLET ORAL DAILY
Qty: 90 TABLET | Refills: 1 | Status: SHIPPED | OUTPATIENT
Start: 2023-02-09

## 2023-02-20 ENCOUNTER — LAB (OUTPATIENT)
Dept: LAB | Facility: HOSPITAL | Age: 63
End: 2023-02-20
Payer: COMMERCIAL

## 2023-02-20 DIAGNOSIS — E07.9 DISORDER OF THYROID: ICD-10-CM

## 2023-02-20 DIAGNOSIS — I10 PRIMARY HYPERTENSION: ICD-10-CM

## 2023-02-20 LAB
CHOLEST SERPL-MCNC: 172 MG/DL (ref 0–200)
HDLC SERPL QL: 3.37
HDLC SERPL-MCNC: 51 MG/DL (ref 40–60)
LDLC SERPL CALC-MCNC: 105 MG/DL (ref 0–100)
TRIGL SERPL-MCNC: 87 MG/DL (ref 0–150)
TSH SERPL DL<=0.05 MIU/L-ACNC: 0.34 UIU/ML (ref 0.27–4.2)
VLDLC SERPL-MCNC: 16 MG/DL (ref 5–40)

## 2023-02-20 PROCEDURE — 80061 LIPID PANEL: CPT

## 2023-02-20 PROCEDURE — 84443 ASSAY THYROID STIM HORMONE: CPT

## 2023-02-20 PROCEDURE — 36415 COLL VENOUS BLD VENIPUNCTURE: CPT

## 2023-02-22 ENCOUNTER — TELEPHONE (OUTPATIENT)
Dept: FAMILY MEDICINE CLINIC | Facility: CLINIC | Age: 63
End: 2023-02-22
Payer: COMMERCIAL

## 2023-02-22 DIAGNOSIS — E66.09 CLASS 2 OBESITY DUE TO EXCESS CALORIES WITHOUT SERIOUS COMORBIDITY WITH BODY MASS INDEX (BMI) OF 35.0 TO 35.9 IN ADULT: Primary | ICD-10-CM

## 2023-02-22 NOTE — TELEPHONE ENCOUNTER
Caller: Festus French    Relationship to patient: Self    Best call back number: [phone]    Patient is needing: PATIENT STATES THAT SHE IS TAKING WEGOVY, PATIENT IS TAKING HER LAST DOSAGE TODAY 02.22.2023 AND NEEDS TO KNOW ABOUT THE DOSAGE AMOUNT FOR NEXT WEEK, WHETHER IT IS TO BE INCREASED OR STAY THE SAME.

## 2023-02-23 ENCOUNTER — OFFICE VISIT (OUTPATIENT)
Dept: ONCOLOGY | Facility: CLINIC | Age: 63
End: 2023-02-23
Payer: COMMERCIAL

## 2023-02-23 ENCOUNTER — LAB (OUTPATIENT)
Dept: LAB | Facility: HOSPITAL | Age: 63
End: 2023-02-23
Payer: COMMERCIAL

## 2023-02-23 VITALS
OXYGEN SATURATION: 97 % | WEIGHT: 222 LBS | SYSTOLIC BLOOD PRESSURE: 150 MMHG | HEIGHT: 66 IN | DIASTOLIC BLOOD PRESSURE: 96 MMHG | TEMPERATURE: 98 F | HEART RATE: 88 BPM | RESPIRATION RATE: 16 BRPM | BODY MASS INDEX: 35.68 KG/M2

## 2023-02-23 DIAGNOSIS — D69.6 THROMBOCYTOPENIA, ACQUIRED: ICD-10-CM

## 2023-02-23 DIAGNOSIS — D69.6 THROMBOCYTOPENIA, ACQUIRED: Primary | ICD-10-CM

## 2023-02-23 LAB
ALBUMIN SERPL-MCNC: 4.3 G/DL (ref 3.5–5.2)
ALBUMIN/GLOB SERPL: 1.5 G/DL (ref 1.1–2.4)
ALP SERPL-CCNC: 67 U/L (ref 38–116)
ALT SERPL W P-5'-P-CCNC: 14 U/L (ref 0–33)
ANION GAP SERPL CALCULATED.3IONS-SCNC: 11.8 MMOL/L (ref 5–15)
AST SERPL-CCNC: 15 U/L (ref 0–32)
BASOPHILS # BLD AUTO: 0.04 10*3/MM3 (ref 0–0.2)
BASOPHILS NFR BLD AUTO: 1.1 % (ref 0–1.5)
BILIRUB SERPL-MCNC: 0.3 MG/DL (ref 0.2–1.2)
BUN SERPL-MCNC: 7 MG/DL (ref 6–20)
BUN/CREAT SERPL: 7.7 (ref 7.3–30)
CALCIUM SPEC-SCNC: 10.5 MG/DL (ref 8.5–10.2)
CHLORIDE SERPL-SCNC: 104 MMOL/L (ref 98–107)
CO2 SERPL-SCNC: 29.2 MMOL/L (ref 22–29)
CREAT SERPL-MCNC: 0.91 MG/DL (ref 0.6–1.1)
DEPRECATED RDW RBC AUTO: 38.2 FL (ref 37–54)
EGFRCR SERPLBLD CKD-EPI 2021: 71.5 ML/MIN/1.73
EOSINOPHIL # BLD AUTO: 0.07 10*3/MM3 (ref 0–0.4)
EOSINOPHIL NFR BLD AUTO: 2 % (ref 0.3–6.2)
ERYTHROCYTE [DISTWIDTH] IN BLOOD BY AUTOMATED COUNT: 12.5 % (ref 12.3–15.4)
GLOBULIN UR ELPH-MCNC: 2.9 GM/DL (ref 1.8–3.5)
GLUCOSE SERPL-MCNC: 90 MG/DL (ref 74–124)
HCT VFR BLD AUTO: 42.8 % (ref 34–46.6)
HGB BLD-MCNC: 14.1 G/DL (ref 12–15.9)
IMM GRANULOCYTES # BLD AUTO: 0.01 10*3/MM3 (ref 0–0.05)
IMM GRANULOCYTES NFR BLD AUTO: 0.3 % (ref 0–0.5)
LYMPHOCYTES # BLD AUTO: 1.4 10*3/MM3 (ref 0.7–3.1)
LYMPHOCYTES NFR BLD AUTO: 39.3 % (ref 19.6–45.3)
MCH RBC QN AUTO: 27.6 PG (ref 26.6–33)
MCHC RBC AUTO-ENTMCNC: 32.9 G/DL (ref 31.5–35.7)
MCV RBC AUTO: 83.9 FL (ref 79–97)
MONOCYTES # BLD AUTO: 0.42 10*3/MM3 (ref 0.1–0.9)
MONOCYTES NFR BLD AUTO: 11.8 % (ref 5–12)
NEUTROPHILS NFR BLD AUTO: 1.62 10*3/MM3 (ref 1.7–7)
NEUTROPHILS NFR BLD AUTO: 45.5 % (ref 42.7–76)
NRBC BLD AUTO-RTO: 0 /100 WBC (ref 0–0.2)
PLATELET # BLD AUTO: 200 10*3/MM3 (ref 140–450)
PMV BLD AUTO: 13 FL (ref 6–12)
POTASSIUM SERPL-SCNC: 4.3 MMOL/L (ref 3.5–4.7)
PROT SERPL-MCNC: 7.2 G/DL (ref 6.3–8)
RBC # BLD AUTO: 5.1 10*6/MM3 (ref 3.77–5.28)
SODIUM SERPL-SCNC: 145 MMOL/L (ref 134–145)
WBC NRBC COR # BLD: 3.56 10*3/MM3 (ref 3.4–10.8)

## 2023-02-23 PROCEDURE — 80053 COMPREHEN METABOLIC PANEL: CPT

## 2023-02-23 PROCEDURE — 36415 COLL VENOUS BLD VENIPUNCTURE: CPT

## 2023-02-23 PROCEDURE — 99213 OFFICE O/P EST LOW 20 MIN: CPT | Performed by: INTERNAL MEDICINE

## 2023-02-23 PROCEDURE — 85025 COMPLETE CBC W/AUTO DIFF WBC: CPT

## 2023-02-23 NOTE — PROGRESS NOTES
Subjective . No SYMPTOMS    REASONS FOR FOLLOW UP:  LEUKOPENIA AND LYMPHOCYTOSIS.MARGINAL LOW B12 LEVEL.mild thrombocytopenia    HISTORY OF PRESENT ILLNESS:   DURING THE VISIT WITH THE PATIENT TODAY , PATIENT HAD FACE MASK, I HAD PROPER PROTECTIVE EQUIPMENT, AND I DID HAND HYGIENE WITH SOAP AND WATER BEFORE AND AFTER THE VISIT.     On 02/23/2023 this 63-year-old  female returns today to the office for follow up of minor leukopenia and thrombocytopenia that has been watched in absence of any other intervention. The patient remains asymptomatic in this regard with no fever, infections of any nature, abnormal bleeding, joint pain, rashes or alterations in performance status. Her appetite remains good, bowel activity and urination remain unchanged. No cardiovascular or respiratory issues besides high blood pressure. She is not having any other new alterations to speak of. As usual she is extremely anxious coming to this office.               Past Medical History:   Diagnosis Date   • Allergic Asa, Pcn/ childhood   • Anxiety 2015   • Arthritis 2020   • Benign heart murmur    • Colon polyp 2021   • Diverticulosis 2021   • HL (hearing loss) 1983   • Hypertension    • Hypothyroidism    • Keloid    • Leukopenia    • Obesity 1990   • Osteopenia    • Sickle cell anemia (HCC) 1961   • Sickle cell anemia without crisis (HCC)     SICKLE CELL TRAIT ONLY     Past Surgical History:   Procedure Laterality Date   • BILATERAL BREAST REDUCTION Bilateral 2013   • BREAST SURGERY  2015   • COLONOSCOPY  01/2021   • HYSTERECTOMY  1997           MEDICATIONS    Current Outpatient Medications:   •  amLODIPine (NORVASC) 10 MG tablet, TAKE 1 TABLET BY MOUTH DAILY, Disp: 90 tablet, Rfl: 1  •  buPROPion XL (WELLBUTRIN XL) 300 MG 24 hr tablet, TAKE 1 TABLET BY MOUTH DAILY, Disp: 90 tablet, Rfl: 1  •  busPIRone (BUSPAR) 10 MG tablet, TAKE 1 TABLET BY MOUTH THREE TIMES DAILY, Disp: 180 tablet, Rfl: 1  •  CALCIUM PO, Take 600 mg by  mouth Daily., Disp: , Rfl:   •  cloNIDine (CATAPRES) 0.3 MG tablet, TAKE 1 TABLET BY MOUTH DAILY, Disp: 90 tablet, Rfl: 1  •  Cyanocobalamin (B-12 PO), Take 1,000 mg by mouth Daily., Disp: , Rfl:   •  fosinopril (MONOPRIL) 40 MG tablet, TAKE 1 TABLET BY MOUTH DAILY, Disp: 90 tablet, Rfl: 1  •  hydroCHLOROthiazide (HYDRODIURIL) 50 MG tablet, TAKE 1 TABLET BY MOUTH DAILY, Disp: 90 tablet, Rfl: 1  •  Insulin Pen Needle (Pen Needles) 32G X 4 MM misc, 1 each 1 (One) Time Per Week., Disp: 30 each, Rfl: 1  •  levothyroxine (SYNTHROID, LEVOTHROID) 150 MCG tablet, TAKE 1 TABLET BY MOUTH DAILY, Disp: 90 tablet, Rfl: 1  •  levothyroxine (SYNTHROID, LEVOTHROID) 175 MCG tablet, Take 1 tablet by mouth Daily., Disp: 90 tablet, Rfl: 1  •  potassium chloride 10 MEQ CR tablet, TAKE 1 TABLET BY MOUTH DAILY, Disp: 90 tablet, Rfl: 1  •  Semaglutide-Weight Management (Wegovy) 0.5 MG/0.5ML solution auto-injector, Inject 0.5 mg under the skin into the appropriate area as directed 1 (One) Time Per Week., Disp: 6 mL, Rfl: 0  •  Semaglutide-Weight Management 0.25 MG/0.5ML solution auto-injector, Inject 0.25 mg under the skin into the appropriate area as directed 1 (One) Time Per Week., Disp: 0.5 mL, Rfl: 0  •  phentermine (ADIPEX-P) 37.5 MG tablet, Take 1 tablet by mouth Every Other Day., Disp: 30 tablet, Rfl: 0  •  topiramate (TOPAMAX) 25 MG tablet, , Disp: , Rfl:     ALLERGIES:     Allergies   Allergen Reactions   • Penicillins Unknown - Low Severity   • Morphine GI Intolerance       SOCIAL HISTORY:       Social History     Socioeconomic History   • Marital status:    • Number of children: 0   • Years of education: COLLEGE   Tobacco Use   • Smoking status: Former     Packs/day: 0.30     Types: Cigarettes     Quit date: 2016     Years since quittin.8   • Smokeless tobacco: Never   • Tobacco comments:     Quit 4/23/16   Vaping Use   • Vaping Use: Never used   Substance and Sexual Activity   • Alcohol use: No   • Drug use: No  "  • Sexual activity: Yes     Partners: Male         FAMILY HISTORY:  Family History   Problem Relation Age of Onset   • Hypertension Mother    • Diabetes Mother    • Breast cancer Mother 65   • COPD Mother    • Arthritis Mother    • Heart disease Mother    • Alcohol abuse Father    • Cirrhosis Father    • Hypertension Sister    • Fibromyalgia Sister    • Diabetes Sister    • Stroke Sister    • Aneurysm Sister    • Hypertension Brother    • Heart disease Brother    • Diabetes Brother    • Sarcoidosis Sister          Objective    Vitals:    02/23/23 1307   BP: 150/96   Pulse: 88   Resp: 16   Temp: 98 °F (36.7 °C)   TempSrc: Temporal   SpO2: 97%   Weight: 101 kg (222 lb)   Height: 167.6 cm (65.98\")   PainSc: 0-No pain     Current Status 2/23/2023   ECOG score 0      PHYSICAL EXAM:         GENERAL:  Well-developed, Patient  in no acute distress.   SKIN:  Warm, dry ,NO purpura ,no rash.  HEENT:  Pupils were equal and reactive to light and accomodation, conjunctivae noninjected,  normal visual acuity.   NECK:  Supple with good range of motion; no thyromegaly , no JVD or bruits,.No carotid artery pain, no carotid abnormal pulsation   LYMPHATICS:  No cervical, NO supraclavicular, NO axillary, NO inguinal adenopathies.  CARDIAC   normal rate , regular rhythm, without murmur,NO rubs NO S3 NO S4   LUNGS: normal breath sounds bilateral, no wheezing, NO rhonchi, NO crackles ,NO rubs.  VASCULAR VENOUS: no cyanosis, NO collateral circulation, NO varicosities, NO edema, NO palpable cords, NO pain,NO erythema, NO pigmentation of the skin.  ABDOMEN:  Soft, NO pain,no hepatomegaly, no splenomegaly,no masses, no ascites, no collateral circulation,no distention.  EXTREMITIES  AND SPINE:  No clubbing, no cyanosis ,no deformities , no pain .No kyphosis,  no pain in spine, no pain in ribs , no pain in pelvic bone.  NEUROLOGICAL:  Patient was awake, alert, oriented to time, person and place.                  Labs:   Lab Results   Component " Value Date    WBC 3.56 02/23/2023    HGB 14.1 02/23/2023    HCT 42.8 02/23/2023    MCV 83.9 02/23/2023     02/23/2023         Assessment/Plan    This patient has minimal leukopenia with lymphocytosis, otherwise asymptomatic, and is very likely that this is representation of leukopenia of .  We have tested this patient during the original consultation for hemoglobinopathy, having a normal hemoglobin electrophoresis.  Also the patient had a normal ferritin, normal iron profile, normal folic acid level and a marginally low B12 level.  The patient had serum protein electrophoresis and immunofixation that were negative for monoclonal proteins.  Also the patient had peripheral blood flow cytometry that failed to document any alteration of monoclonality in regard to her lymphocyte population.    The patient returned to the office on 02/04/2021 with no symptoms of anything. Her physical exam was unremarkable. Her white count, platelet count and hemoglobin remain about the same as before. She is up to date in colonoscopy and mammogram. Her overall health remains excellent. I have not advised her to change anything and I would like to review her back in a year.    The patient was reviewed on 02/16/2022. She is completely asymptomatic. Her clinical examination only shows a minimal systolic murmur at the base, grade 2, with no implications that she has had for a long time. She has seen her cardiologist last year. The rest of her clinical exam is normal. She is asymptomatic. Her white count, hemoglobin and platelets are back to normal at this time. She still has minimal peripheral lymphocytosis. This has been studied in the past and has not had any implications.     On 02/23/2023 the patient was further reviewed. She is completely asymptomatic with a normal clinical examination, no splenomegaly, peripheral adenopathy. The rest of her clinical examination is normal besides the typical hypertension that she  has when she comes to this office.     Her white count is 3500, hemoglobin 14.1, platelet count 200,000. The white count differential is no different than what it has been in the past.     Given the fact that she remains asymptomatic with a normal clinical examination and stable laboratory parameters we advised her to remain in observation. She will return to see us back in a year with a CBC.       ·

## 2023-02-24 RX ORDER — SEMAGLUTIDE 1 MG/.5ML
1 INJECTION, SOLUTION SUBCUTANEOUS WEEKLY
Qty: 6 ML | Refills: 1 | Status: SHIPPED | OUTPATIENT
Start: 2023-02-24 | End: 2023-03-22

## 2023-03-16 DIAGNOSIS — F41.9 ANXIETY: ICD-10-CM

## 2023-03-16 RX ORDER — BUPROPION HYDROCHLORIDE 300 MG/1
TABLET ORAL
Qty: 90 TABLET | Refills: 1 | Status: SHIPPED | OUTPATIENT
Start: 2023-03-16

## 2023-03-22 DIAGNOSIS — E66.09 CLASS 2 OBESITY DUE TO EXCESS CALORIES WITHOUT SERIOUS COMORBIDITY WITH BODY MASS INDEX (BMI) OF 35.0 TO 35.9 IN ADULT: ICD-10-CM

## 2023-03-22 RX ORDER — SEMAGLUTIDE 1 MG/.5ML
1 INJECTION, SOLUTION SUBCUTANEOUS WEEKLY
Qty: 6 ML | Refills: 1 | Status: CANCELLED | OUTPATIENT
Start: 2023-03-22

## 2023-03-22 NOTE — TELEPHONE ENCOUNTER
Caller: Festus French LOLA    Relationship: Self    Best call back number: [phone]    Requested Prescriptions:   Requested Prescriptions     Pending Prescriptions Disp Refills   • Semaglutide-Weight Management (Wegovy) 1 MG/0.5ML solution auto-injector 6 mL 1     Sig: Inject 1 mg under the skin into the appropriate area as directed 1 (One) Time Per Week.        Pharmacy where request should be sent: [id number] - [place], [place] - [address] [facility] AT [facility] OF [address] & [place] - [phone] [facility] [phone] FX     Last office visit with prescribing clinician: 1/4/2023   Last telemedicine visit with prescribing clinician: 4/5/2023   Next office visit with prescribing clinician: 4/5/2023     Additional details provided by patient: PATIENT IS INQUIRING IF THIS CAN BE REFILLED WITH A HIGHER DOSAGE THAN 1 MG    Does the patient have less than a 3 day supply:  [x] Yes  [] No    Would you like a call back once the refill request has been completed: [] Yes [] No    If the office needs to give you a call back, can they leave a voicemail: [] Yes [] No    Mivlia Donnelly Rep   03/22/23 10:42 EDT         DELETE AFTER READING TO PATIENT: “Thank you for sharing this information with me. I will send a message to the clinical team. Please allow 48 hours for the clinical staff to follow up on this request.”

## 2023-04-05 ENCOUNTER — OFFICE VISIT (OUTPATIENT)
Dept: FAMILY MEDICINE CLINIC | Facility: CLINIC | Age: 63
End: 2023-04-05
Payer: COMMERCIAL

## 2023-04-05 VITALS
HEIGHT: 66 IN | WEIGHT: 213 LBS | RESPIRATION RATE: 16 BRPM | DIASTOLIC BLOOD PRESSURE: 84 MMHG | BODY MASS INDEX: 34.23 KG/M2 | HEART RATE: 99 BPM | SYSTOLIC BLOOD PRESSURE: 136 MMHG | TEMPERATURE: 97 F | OXYGEN SATURATION: 98 %

## 2023-04-05 DIAGNOSIS — E66.09 CLASS 1 OBESITY DUE TO EXCESS CALORIES WITHOUT SERIOUS COMORBIDITY WITH BODY MASS INDEX (BMI) OF 34.0 TO 34.9 IN ADULT: Primary | ICD-10-CM

## 2023-04-05 PROCEDURE — 99213 OFFICE O/P EST LOW 20 MIN: CPT | Performed by: NURSE PRACTITIONER

## 2023-04-05 RX ORDER — SEMAGLUTIDE 2.4 MG/.75ML
2.4 INJECTION, SOLUTION SUBCUTANEOUS WEEKLY
Qty: 3 ML | Refills: 2 | Status: SHIPPED | OUTPATIENT
Start: 2023-04-05

## 2023-04-05 NOTE — PROGRESS NOTES
Chief Complaint  Obesity    Subjective        Festus French presents to [facility]  History of Present Illness  Has lost 9 pounds in the last 6 weeks.      The following portions of the patient's history were personally reviewed and updated as appropriate: allergies, current medications, past medical history, past surgical history, past family history, and past social history.     Body mass index is 34.4 kg/m².    BMI is >= 30 and <35. (Class 1 Obesity). The following options were offered after discussion;: weight loss educational material (shared in after visit summary)      Past History:    Medical History: has a past medical history of Allergic (Asa, Pcn/ childhood), Anxiety (2015), Arthritis (2020), Benign heart murmur, Colon polyp (2021), Diverticulosis (2021), HL (hearing loss) (1983), Hypertension, Hypothyroidism, Keloid, Leukopenia, Obesity (1990), Osteopenia, Sickle cell anemia (1961), and Sickle cell anemia without crisis.     Surgical History: has a past surgical history that includes Breast Reduction (Bilateral, 2013); Hysterectomy (1997); Colonoscopy (01/2021); and Breast surgery (2015).     Family History: family history includes Alcohol abuse in her father; Aneurysm in her sister; Arthritis in her mother; Breast cancer (age of onset: 65) in her mother; COPD in her mother; Cirrhosis in her father; Diabetes in her brother, mother, and sister; Fibromyalgia in her sister; Heart disease in her brother and mother; Hypertension in her brother, mother, and sister; Sarcoidosis in her sister; Stroke in her sister.     Social History: reports that she quit smoking about 6 years ago. Her smoking use included cigarettes. She smoked an average of .3 packs per day. She has never used smokeless tobacco. She reports that she does not drink alcohol and does not use drugs.    Allergies: Penicillins and Morphine          Current Outpatient Medications:   •  amLODIPine (NORVASC) 10 MG  tablet, TAKE 1 TABLET BY MOUTH DAILY, Disp: 90 tablet, Rfl: 1  •  buPROPion XL (WELLBUTRIN XL) 300 MG 24 hr tablet, TAKE 1 TABLET BY MOUTH DAILY, Disp: 90 tablet, Rfl: 1  •  busPIRone (BUSPAR) 10 MG tablet, TAKE 1 TABLET BY MOUTH THREE TIMES DAILY, Disp: 180 tablet, Rfl: 1  •  CALCIUM PO, Take 600 mg by mouth Daily., Disp: , Rfl:   •  cloNIDine (CATAPRES) 0.3 MG tablet, TAKE 1 TABLET BY MOUTH DAILY, Disp: 90 tablet, Rfl: 1  •  Cyanocobalamin (B-12 PO), Take 1,000 mg by mouth Daily., Disp: , Rfl:   •  fosinopril (MONOPRIL) 40 MG tablet, TAKE 1 TABLET BY MOUTH DAILY, Disp: 90 tablet, Rfl: 1  •  hydroCHLOROthiazide (HYDRODIURIL) 50 MG tablet, TAKE 1 TABLET BY MOUTH DAILY, Disp: 90 tablet, Rfl: 1  •  Insulin Pen Needle (Pen Needles) 32G X 4 MM misc, 1 each 1 (One) Time Per Week., Disp: 30 each, Rfl: 1  •  levothyroxine (SYNTHROID, LEVOTHROID) 150 MCG tablet, TAKE 1 TABLET BY MOUTH DAILY, Disp: 90 tablet, Rfl: 1  •  levothyroxine (SYNTHROID, LEVOTHROID) 175 MCG tablet, Take 1 tablet by mouth Daily., Disp: 90 tablet, Rfl: 1  •  potassium chloride 10 MEQ CR tablet, TAKE 1 TABLET BY MOUTH DAILY, Disp: 90 tablet, Rfl: 1  •  Semaglutide-Weight Management (Wegovy) 2.4 MG/0.75ML solution auto-injector, Inject 2.4 mg under the skin into the appropriate area as directed 1 (One) Time Per Week., Disp: 3 mL, Rfl: 2  •  topiramate (TOPAMAX) 25 MG tablet, , Disp: , Rfl:     Medications Discontinued During This Encounter   Medication Reason   • Semaglutide-Weight Management 1.7 MG/0.75ML solution auto-injector *Therapy completed         Review of Systems   Constitutional: Negative for fever.   Respiratory: Negative for cough and shortness of breath.    Cardiovascular: Negative for chest pain, palpitations and leg swelling.   Neurological: Negative for dizziness, weakness, numbness and headache.        Objective         Vitals:    04/05/23 0725   BP: 136/84   BP Location: Right arm   Patient Position: Sitting   Cuff Size: Large Adult  "  Pulse: 99   Resp: 16   Temp: 97 °F (36.1 °C)   TempSrc: Temporal   SpO2: 98%   Weight: 96.6 kg (213 lb)   Height: 167.6 cm (65.98\")     Body mass index is 34.4 kg/m².         Physical Exam  Vitals reviewed.   Constitutional:       Appearance: Normal appearance. She is well-developed.   HENT:      Head: Normocephalic and atraumatic.      Mouth/Throat:      Pharynx: No oropharyngeal exudate.   Eyes:      Conjunctiva/sclera: Conjunctivae normal.      Pupils: Pupils are equal, round, and reactive to light.   Cardiovascular:      Rate and Rhythm: Normal rate and regular rhythm.      Heart sounds: Normal heart sounds. No murmur heard.    No friction rub. No gallop.   Pulmonary:      Effort: Pulmonary effort is normal.      Breath sounds: Normal breath sounds. No wheezing or rhonchi.   Skin:     General: Skin is warm and dry.   Neurological:      Mental Status: She is alert and oriented to person, place, and time.   Psychiatric:         Mood and Affect: Mood and affect normal.         Behavior: Behavior normal.         Thought Content: Thought content normal.         Judgment: Judgment normal.             Result Review :               Assessment and Plan     Diagnoses and all orders for this visit:    1. Class 1 obesity due to excess calories without serious comorbidity with body mass index (BMI) of 34.0 to 34.9 in adult (Primary)  -     Semaglutide-Weight Management (Wegovy) 2.4 MG/0.75ML solution auto-injector; Inject 2.4 mg under the skin into the appropriate area as directed 1 (One) Time Per Week.  Dispense: 3 mL; Refill: 2              Follow Up     Return in about 3 months (around 7/5/2023).    Patient was given instructions and counseling regarding her condition or for health maintenance advice. Please see specific information pulled into the AVS if appropriate.         "

## 2023-04-27 DIAGNOSIS — E07.9 DISORDER OF THYROID: ICD-10-CM

## 2023-04-27 RX ORDER — LEVOTHYROXINE SODIUM 175 UG/1
175 TABLET ORAL DAILY
Qty: 90 TABLET | Refills: 1 | Status: SHIPPED | OUTPATIENT
Start: 2023-04-27

## 2023-05-10 DIAGNOSIS — I10 ESSENTIAL HYPERTENSION: ICD-10-CM

## 2023-05-10 RX ORDER — FOSINOPRIL SODIUM 40 MG/1
40 TABLET ORAL DAILY
Qty: 90 TABLET | Refills: 1 | Status: SHIPPED | OUTPATIENT
Start: 2023-05-10

## 2023-06-14 DIAGNOSIS — F41.9 ANXIETY: ICD-10-CM

## 2023-06-14 RX ORDER — BUPROPION HYDROCHLORIDE 300 MG/1
TABLET ORAL
Qty: 90 TABLET | Refills: 1 | Status: SHIPPED | OUTPATIENT
Start: 2023-06-14

## 2023-06-16 DIAGNOSIS — F41.9 ANXIETY: ICD-10-CM

## 2023-06-19 RX ORDER — BUPROPION HYDROCHLORIDE 300 MG/1
TABLET ORAL
Qty: 90 TABLET | Refills: 1 | OUTPATIENT
Start: 2023-06-19

## 2023-08-08 DIAGNOSIS — E07.9 DISORDER OF THYROID: ICD-10-CM

## 2023-08-08 DIAGNOSIS — I10 ESSENTIAL HYPERTENSION: ICD-10-CM

## 2023-08-08 RX ORDER — HYDROCHLOROTHIAZIDE 50 MG/1
50 TABLET ORAL DAILY
Qty: 90 TABLET | Refills: 1 | Status: SHIPPED | OUTPATIENT
Start: 2023-08-08

## 2023-08-08 RX ORDER — CLONIDINE HYDROCHLORIDE 0.3 MG/1
0.3 TABLET ORAL DAILY
Qty: 90 TABLET | Refills: 1 | Status: SHIPPED | OUTPATIENT
Start: 2023-08-08

## 2023-08-08 RX ORDER — LEVOTHYROXINE SODIUM 0.15 MG/1
150 TABLET ORAL DAILY
Qty: 90 TABLET | Refills: 1 | Status: SHIPPED | OUTPATIENT
Start: 2023-08-08

## 2023-08-08 RX ORDER — AMLODIPINE BESYLATE 10 MG/1
10 TABLET ORAL DAILY
Qty: 90 TABLET | Refills: 1 | Status: SHIPPED | OUTPATIENT
Start: 2023-08-08

## 2023-09-21 DIAGNOSIS — F41.9 ANXIETY: ICD-10-CM

## 2023-09-21 RX ORDER — BUPROPION HYDROCHLORIDE 300 MG/1
TABLET ORAL
Qty: 90 TABLET | Refills: 1 | Status: SHIPPED | OUTPATIENT
Start: 2023-09-21

## 2023-09-28 ENCOUNTER — LAB (OUTPATIENT)
Dept: LAB | Facility: HOSPITAL | Age: 63
End: 2023-09-28
Payer: COMMERCIAL

## 2023-09-28 ENCOUNTER — HOSPITAL ENCOUNTER (OUTPATIENT)
Dept: MAMMOGRAPHY | Facility: HOSPITAL | Age: 63
Discharge: HOME | End: 2023-09-28
Payer: COMMERCIAL

## 2023-09-28 DIAGNOSIS — Z12.31 ENCOUNTER FOR SCREENING MAMMOGRAM FOR MALIGNANT NEOPLASM OF BREAST: ICD-10-CM

## 2023-09-28 DIAGNOSIS — E07.9 DISORDER OF THYROID: ICD-10-CM

## 2023-09-28 LAB
T4 FREE SERPL-MCNC: 1.92 NG/DL (ref 0.93–1.7)
TSH SERPL DL<=0.05 MIU/L-ACNC: 0.01 UIU/ML (ref 0.27–4.2)

## 2023-09-28 PROCEDURE — 84443 ASSAY THYROID STIM HORMONE: CPT

## 2023-09-28 PROCEDURE — 77063 BREAST TOMOSYNTHESIS BI: CPT

## 2023-09-28 PROCEDURE — 36415 COLL VENOUS BLD VENIPUNCTURE: CPT

## 2023-09-28 PROCEDURE — 77067 SCR MAMMO BI INCL CAD: CPT

## 2023-09-28 PROCEDURE — 84439 ASSAY OF FREE THYROXINE: CPT

## 2023-09-29 DIAGNOSIS — E07.9 DISORDER OF THYROID: ICD-10-CM

## 2023-09-29 RX ORDER — LEVOTHYROXINE SODIUM 137 UG/1
137 TABLET ORAL DAILY
Qty: 90 TABLET | Refills: 1 | Status: SHIPPED | OUTPATIENT
Start: 2023-09-29

## 2023-10-05 ENCOUNTER — OFFICE VISIT (OUTPATIENT)
Dept: FAMILY MEDICINE CLINIC | Facility: CLINIC | Age: 63
End: 2023-10-05
Payer: COMMERCIAL

## 2023-10-05 VITALS
HEIGHT: 66 IN | TEMPERATURE: 97.4 F | WEIGHT: 206.4 LBS | DIASTOLIC BLOOD PRESSURE: 74 MMHG | OXYGEN SATURATION: 97 % | BODY MASS INDEX: 33.17 KG/M2 | HEART RATE: 79 BPM | SYSTOLIC BLOOD PRESSURE: 136 MMHG

## 2023-10-05 DIAGNOSIS — Z00.00 ANNUAL PHYSICAL EXAM: Primary | ICD-10-CM

## 2023-10-05 DIAGNOSIS — E07.9 DISORDER OF THYROID: ICD-10-CM

## 2023-10-05 DIAGNOSIS — I10 PRIMARY HYPERTENSION: ICD-10-CM

## 2023-10-05 DIAGNOSIS — F41.9 ANXIETY: ICD-10-CM

## 2023-10-05 PROCEDURE — 99396 PREV VISIT EST AGE 40-64: CPT | Performed by: NURSE PRACTITIONER

## 2023-10-05 NOTE — PROGRESS NOTES
Chief Complaint  Hypertension (3 month f/u), Anxiety, Dizziness (X 1 day), and Annual Exam    Subjective        Festus French presents to [facility]  History of Present Illness  Doesn't think the Wegovy.is working hasn't lost any in the lst 3 months and hasn't taken in the last week.  Annual exam    Hypertension:  doing well on medication 136/74.    Anxiety:  doing well on medication.    Dizziness the other day but only lasted for a few minutes.  Did the epley maneuver. And that worked well.  Has a history of tinnitus.  States was sitting at desk at work at home and started feeling dizzy.  Hypertension  Associated symptoms include anxiety. Pertinent negatives include no chest pain, palpitations or shortness of breath.   Anxiety  Symptoms include dizziness. Patient reports no chest pain, palpitations or shortness of breath.       Dizziness  Pertinent negatives include no chest pain, coughing, fever, numbness or weakness.     The following portions of the patient's history were personally reviewed and updated as appropriate: allergies, current medications, past medical history, past surgical history, past family history, and past social history.     Body mass index is 33.33 kg/m².           Past History:    Medical History: has a past medical history of Allergic (Asa, Pcn/ childhood), Anxiety (2015), Arthritis (2020), Benign heart murmur, Colon polyp (2021), Diverticulosis (2021), HL (hearing loss) (1983), Hypertension, Hypothyroidism, Keloid, Leukopenia, Obesity (1990), Osteopenia, Sickle cell anemia (1961), and Sickle cell anemia without crisis.     Surgical History: has a past surgical history that includes Breast Reduction (Bilateral, 2013); Hysterectomy (1997); Colonoscopy (01/2021); and Breast surgery (2015).     Family History: family history includes Alcohol abuse in her father; Aneurysm in her sister; Arthritis in her mother; Breast cancer (age of onset: 65) in her mother;  COPD in her mother; Cirrhosis in her father; Diabetes in her brother, mother, and sister; Fibromyalgia in her sister; Heart disease in her brother and mother; Hypertension in her brother, mother, and sister; Sarcoidosis in her sister; Stroke in her sister.     Social History: reports that she quit smoking about 7 years ago. Her smoking use included cigarettes. She smoked an average of .3 packs per day. She has never used smokeless tobacco. She reports current alcohol use. She reports that she does not use drugs.    Allergies: Morphine and Penicillins          Current Outpatient Medications:     amLODIPine (NORVASC) 10 MG tablet, TAKE 1 TABLET BY MOUTH DAILY, Disp: 90 tablet, Rfl: 1    buPROPion XL (WELLBUTRIN XL) 300 MG 24 hr tablet, TAKE 1 TABLET BY MOUTH DAILY, Disp: 90 tablet, Rfl: 1    busPIRone (BUSPAR) 10 MG tablet, TAKE 1 TABLET BY MOUTH THREE TIMES DAILY, Disp: 180 tablet, Rfl: 1    CALCIUM PO, Take 600 mg by mouth Daily., Disp: , Rfl:     cloNIDine (CATAPRES) 0.3 MG tablet, TAKE 1 TABLET BY MOUTH DAILY, Disp: 90 tablet, Rfl: 1    Cyanocobalamin (B-12 PO), Take 1,000 mg by mouth Daily., Disp: , Rfl:     fosinopril (MONOPRIL) 40 MG tablet, TAKE 1 TABLET BY MOUTH DAILY, Disp: 90 tablet, Rfl: 1    hydroCHLOROthiazide (HYDRODIURIL) 50 MG tablet, TAKE 1 TABLET BY MOUTH DAILY, Disp: 90 tablet, Rfl: 1    Insulin Pen Needle (Pen Needles) 32G X 4 MM misc, 1 each 1 (One) Time Per Week., Disp: 30 each, Rfl: 1    levothyroxine (SYNTHROID, LEVOTHROID) 137 MCG tablet, Take 1 tablet by mouth Daily., Disp: 90 tablet, Rfl: 1    potassium chloride 10 MEQ CR tablet, TAKE 1 TABLET BY MOUTH DAILY, Disp: 90 tablet, Rfl: 1    Wegovy 2.4 MG/0.75ML solution auto-injector, ADMINISTER 2.4MG(0.75ML) UNDER THE SKIN 1 TIME EVERY WEEK AS DIRECTED, Disp: 3 mL, Rfl: 2    There are no discontinued medications.      Review of Systems   Constitutional:  Negative for fever.   Respiratory:  Negative for cough and shortness of breath.   "  Cardiovascular:  Negative for chest pain, palpitations and leg swelling.   Neurological:  Positive for dizziness. Negative for weakness, numbness and headache.      Objective         Vitals:    10/05/23 0800   BP: 136/74   BP Location: Right arm   Patient Position: Sitting   Cuff Size: Large Adult   Pulse: 79   Temp: 97.4 °F (36.3 °C)   SpO2: 97%   Weight: 93.6 kg (206 lb 6.4 oz)   Height: 167.6 cm (65.98\")     Body mass index is 33.33 kg/m².         Physical Exam  Vitals reviewed.   Constitutional:       Appearance: Normal appearance. She is well-developed.   HENT:      Head: Normocephalic and atraumatic.      Mouth/Throat:      Pharynx: No oropharyngeal exudate.   Eyes:      Conjunctiva/sclera: Conjunctivae normal.      Pupils: Pupils are equal, round, and reactive to light.   Cardiovascular:      Rate and Rhythm: Normal rate and regular rhythm.      Heart sounds: Murmur heard.     No friction rub. No gallop.   Pulmonary:      Effort: Pulmonary effort is normal.      Breath sounds: Normal breath sounds. No wheezing or rhonchi.   Skin:     General: Skin is warm and dry.   Neurological:      Mental Status: She is alert and oriented to person, place, and time.   Psychiatric:         Mood and Affect: Mood and affect normal.         Behavior: Behavior normal.         Thought Content: Thought content normal.         Judgment: Judgment normal.           Result Review :               Assessment and Plan     Diagnoses and all orders for this visit:    1. Annual physical exam (Primary)  Comments:  discussed diet and exercise.    2. Primary hypertension  -     Lipid Panel With / Chol / HDL Ratio; Future  -     Comprehensive Metabolic Panel; Future  -     CBC (No Diff); Future  -     Urinalysis With Culture If Indicated -; Future    3. Disorder of thyroid  -     TSH; Future    4. Anxiety              Follow Up     Return in about 6 months (around 4/5/2024).    Patient was given instructions and counseling regarding her " condition or for health maintenance advice. Please see specific information pulled into the AVS if appropriate.

## 2023-11-06 DIAGNOSIS — I10 ESSENTIAL HYPERTENSION: ICD-10-CM

## 2023-11-06 RX ORDER — FOSINOPRIL SODIUM 40 MG/1
40 TABLET ORAL DAILY
Qty: 90 TABLET | Refills: 1 | Status: SHIPPED | OUTPATIENT
Start: 2023-11-06

## 2023-12-12 DIAGNOSIS — I10 ESSENTIAL HYPERTENSION: ICD-10-CM

## 2023-12-12 DIAGNOSIS — F41.9 ANXIETY: ICD-10-CM

## 2023-12-13 RX ORDER — BUSPIRONE HYDROCHLORIDE 10 MG/1
TABLET ORAL
Qty: 180 TABLET | Refills: 1 | Status: SHIPPED | OUTPATIENT
Start: 2023-12-13

## 2023-12-13 RX ORDER — POTASSIUM CHLORIDE 750 MG/1
10 TABLET, FILM COATED, EXTENDED RELEASE ORAL DAILY
Qty: 90 TABLET | Refills: 1 | Status: SHIPPED | OUTPATIENT
Start: 2023-12-13

## 2024-01-11 ENCOUNTER — OFFICE VISIT (OUTPATIENT)
Dept: FAMILY MEDICINE CLINIC | Facility: CLINIC | Age: 64
End: 2024-01-11
Payer: COMMERCIAL

## 2024-01-11 VITALS
OXYGEN SATURATION: 93 % | SYSTOLIC BLOOD PRESSURE: 142 MMHG | HEART RATE: 96 BPM | HEIGHT: 66 IN | RESPIRATION RATE: 16 BRPM | TEMPERATURE: 97.2 F | WEIGHT: 222 LBS | BODY MASS INDEX: 35.68 KG/M2 | DIASTOLIC BLOOD PRESSURE: 88 MMHG

## 2024-01-11 DIAGNOSIS — J30.2 SEASONAL ALLERGIES: ICD-10-CM

## 2024-01-11 DIAGNOSIS — M77.8 TENDONITIS OF ELBOW, LEFT: ICD-10-CM

## 2024-01-11 DIAGNOSIS — E66.09 CLASS 2 OBESITY DUE TO EXCESS CALORIES WITHOUT SERIOUS COMORBIDITY WITH BODY MASS INDEX (BMI) OF 35.0 TO 35.9 IN ADULT: ICD-10-CM

## 2024-01-11 DIAGNOSIS — Z51.81 MEDICATION MONITORING ENCOUNTER: Primary | ICD-10-CM

## 2024-01-11 LAB
AMPHET+METHAMPHET UR QL: NEGATIVE
AMPHETAMINE INTERNAL CONTROL: NORMAL
AMPHETAMINES UR QL: NEGATIVE
BARBITURATE INTERNAL CONTROL: NORMAL
BARBITURATES UR QL SCN: NEGATIVE
BENZODIAZ UR QL SCN: NEGATIVE
BENZODIAZEPINE INTERNAL CONTROL: NORMAL
BUPRENORPHINE INTERNAL CONTROL: NORMAL
BUPRENORPHINE SERPL-MCNC: NEGATIVE NG/ML
CANNABINOIDS SERPL QL: NEGATIVE
COCAINE INTERNAL CONTROL: NORMAL
COCAINE UR QL: NEGATIVE
EXPIRATION DATE: NORMAL
Lab: NORMAL
MDMA (ECSTASY) INTERNAL CONTROL: NORMAL
MDMA UR QL SCN: NEGATIVE
METHADONE INTERNAL CONTROL: NORMAL
METHADONE UR QL SCN: NEGATIVE
METHAMPHETAMINE INTERNAL CONTROL: NORMAL
MORPHINE INTERNAL CONTROL: NORMAL
MORPHINE/OPIATES SCREEN, URINE: NEGATIVE
OXYCODONE INTERNAL CONTROL: NORMAL
OXYCODONE UR QL SCN: NEGATIVE
PCP UR QL SCN: NEGATIVE
PHENCYCLIDINE INTERNAL CONTROL: NORMAL
THC INTERNAL CONTROL: NORMAL

## 2024-01-11 RX ORDER — AZELASTINE 1 MG/ML
2 SPRAY, METERED NASAL 2 TIMES DAILY
Qty: 30 ML | Refills: 12 | Status: SHIPPED | OUTPATIENT
Start: 2024-01-11

## 2024-01-11 RX ORDER — PHENTERMINE HYDROCHLORIDE 37.5 MG/1
37.5 TABLET ORAL
Qty: 30 TABLET | Refills: 0 | Status: SHIPPED | OUTPATIENT
Start: 2024-01-11

## 2024-01-11 NOTE — PROGRESS NOTES
Chief Complaint  Numbness (Left hand 2-3rd digit numb- previous dx of ulnar nerve issues) and Obesity (Wants to go back on phentermine/)    Subjective        Festus French presents to [facility]  History of Present Illness  Obesity:  would like to go back on phentermine.  Was uncomfortable with her self shots.   Recheck blood pressure is good on recheck. Understands need to lose 4 pounds and the risk for PPH with taking phentermine.  She also understands to continue on will need to have a controlled blood pressure.    Having numbness of left hand in first 3 fingers.  Has ulnar tunnel but is usually not an issue.    Congestion when leaning forward only occasional PND.      Obesity  Pertinent negatives include no chest pain, coughing, fever, numbness or weakness.       The following portions of the patient's history were personally reviewed and updated as appropriate: allergies, current medications, past medical history, past surgical history, past family history, and past social history.     Body mass index is 35.85 kg/m².           Past History:    Medical History: has a past medical history of Allergic (Asa, Pcn/ childhood), Anxiety (2015), Arthritis (2020), Benign heart murmur, Colon polyp (2021), Diverticulosis (2021), HL (hearing loss) (1983), Hypertension, Hypothyroidism, Keloid, Leukopenia, Obesity (1990), Osteopenia, Sickle cell anemia (1961), and Sickle cell anemia without crisis.     Surgical History: has a past surgical history that includes Breast Reduction (Bilateral, 2013); Hysterectomy (1997); Colonoscopy (01/2021); and Breast surgery (2015).     Family History: family history includes Alcohol abuse in her father; Aneurysm in her sister; Arthritis in her mother; Breast cancer (age of onset: 65) in her mother; COPD in her mother; Cirrhosis in her father; Diabetes in her brother, mother, and sister; Fibromyalgia in her sister; Heart disease in her brother and mother;  Hypertension in her brother, mother, and sister; Sarcoidosis in her sister; Stroke in her sister.     Social History: reports that she quit smoking about 7 years ago. Her smoking use included cigarettes. She smoked an average of .3 packs per day. She has never used smokeless tobacco. She reports current alcohol use. She reports that she does not use drugs.    Allergies: Morphine and Penicillins          Current Outpatient Medications:     amLODIPine (NORVASC) 10 MG tablet, TAKE 1 TABLET BY MOUTH DAILY, Disp: 90 tablet, Rfl: 1    buPROPion XL (WELLBUTRIN XL) 300 MG 24 hr tablet, TAKE 1 TABLET BY MOUTH DAILY, Disp: 90 tablet, Rfl: 1    busPIRone (BUSPAR) 10 MG tablet, TAKE 1 TABLET BY MOUTH THREE TIMES DAILY, Disp: 180 tablet, Rfl: 1    CALCIUM PO, Take 600 mg by mouth Daily., Disp: , Rfl:     cloNIDine (CATAPRES) 0.3 MG tablet, TAKE 1 TABLET BY MOUTH DAILY, Disp: 90 tablet, Rfl: 1    Cyanocobalamin (B-12 PO), Take 1,000 mg by mouth Daily., Disp: , Rfl:     fosinopril (MONOPRIL) 40 MG tablet, TAKE 1 TABLET BY MOUTH DAILY, Disp: 90 tablet, Rfl: 1    hydroCHLOROthiazide (HYDRODIURIL) 50 MG tablet, TAKE 1 TABLET BY MOUTH DAILY, Disp: 90 tablet, Rfl: 1    Insulin Pen Needle (Pen Needles) 32G X 4 MM misc, 1 each 1 (One) Time Per Week., Disp: 30 each, Rfl: 1    levothyroxine (SYNTHROID, LEVOTHROID) 137 MCG tablet, Take 1 tablet by mouth Daily., Disp: 90 tablet, Rfl: 1    potassium chloride 10 MEQ CR tablet, TAKE 1 TABLET BY MOUTH DAILY, Disp: 90 tablet, Rfl: 1    azelastine (ASTELIN) 0.1 % nasal spray, 2 sprays into the nostril(s) as directed by provider 2 (Two) Times a Day. Use in each nostril as directed, Disp: 30 mL, Rfl: 12    phentermine (ADIPEX-P) 37.5 MG tablet, Take 1 tablet by mouth Every Morning Before Breakfast., Disp: 30 tablet, Rfl: 0    Wegovy 2.4 MG/0.75ML solution auto-injector, ADMINISTER 2.4MG(0.75ML) UNDER THE SKIN 1 TIME EVERY WEEK AS DIRECTED (Patient not taking: Reported on 1/11/2024), Disp: 3 mL, Rfl:  "2    There are no discontinued medications.      Review of Systems   Constitutional:  Negative for fever.   Respiratory:  Negative for cough and shortness of breath.    Cardiovascular:  Negative for chest pain, palpitations and leg swelling.   Neurological:  Negative for dizziness, weakness, numbness and headache.        Objective         Vitals:    01/11/24 0751   BP: 142/88   BP Location: Right arm   Patient Position: Sitting   Cuff Size: Adult   Pulse: 96   Resp: 16   Temp: 97.2 °F (36.2 °C)   TempSrc: Temporal   SpO2: 93%   Weight: 101 kg (222 lb)   Height: 167.6 cm (65.98\")     Body mass index is 35.85 kg/m².         Physical Exam  Vitals reviewed.   Constitutional:       Appearance: Normal appearance. She is well-developed.   HENT:      Head: Normocephalic and atraumatic.      Mouth/Throat:      Pharynx: No oropharyngeal exudate.   Eyes:      Conjunctiva/sclera: Conjunctivae normal.      Pupils: Pupils are equal, round, and reactive to light.   Cardiovascular:      Rate and Rhythm: Normal rate and regular rhythm.      Heart sounds: Normal heart sounds. No murmur heard.     No friction rub. No gallop.   Pulmonary:      Effort: Pulmonary effort is normal.      Breath sounds: Normal breath sounds. No wheezing or rhonchi.   Musculoskeletal:        Arms:       Comments: Tenderness to area and history of ulnar tunnel.  Left hand dominant.   Skin:     General: Skin is warm and dry.   Neurological:      Mental Status: She is alert and oriented to person, place, and time.   Psychiatric:         Mood and Affect: Mood and affect normal.         Behavior: Behavior normal.         Thought Content: Thought content normal.         Judgment: Judgment normal.             Result Review :               Assessment and Plan     Diagnoses and all orders for this visit:    1. Medication monitoring encounter (Primary)  -     POC Medline 12 Panel Urine Drug Screen    2. Tendonitis of elbow, left    3. Class 2 obesity due to excess " calories without serious comorbidity with body mass index (BMI) of 35.0 to 35.9 in adult  -     phentermine (ADIPEX-P) 37.5 MG tablet; Take 1 tablet by mouth Every Morning Before Breakfast.  Dispense: 30 tablet; Refill: 0    4. Seasonal allergies  -     azelastine (ASTELIN) 0.1 % nasal spray; 2 sprays into the nostril(s) as directed by provider 2 (Two) Times a Day. Use in each nostril as directed  Dispense: 30 mL; Refill: 12      Try aleve to see if helps. Elbow.         Follow Up     Return in about 1 month (around 2/11/2024).    Patient was given instructions and counseling regarding her condition or for health maintenance advice. Please see specific information pulled into the AVS if appropriate.

## 2024-01-12 ENCOUNTER — TELEPHONE (OUTPATIENT)
Dept: FAMILY MEDICINE CLINIC | Facility: CLINIC | Age: 64
End: 2024-01-12
Payer: COMMERCIAL

## 2024-01-21 DIAGNOSIS — I10 ESSENTIAL HYPERTENSION: ICD-10-CM

## 2024-01-22 RX ORDER — AMLODIPINE BESYLATE 10 MG/1
10 TABLET ORAL DAILY
Qty: 90 TABLET | Refills: 1 | Status: SHIPPED | OUTPATIENT
Start: 2024-01-22

## 2024-01-22 RX ORDER — CLONIDINE HYDROCHLORIDE 0.3 MG/1
0.3 TABLET ORAL DAILY
Qty: 90 TABLET | Refills: 1 | Status: SHIPPED | OUTPATIENT
Start: 2024-01-22

## 2024-02-14 ENCOUNTER — OFFICE VISIT (OUTPATIENT)
Dept: FAMILY MEDICINE CLINIC | Facility: CLINIC | Age: 64
End: 2024-02-14
Payer: COMMERCIAL

## 2024-02-14 VITALS
DIASTOLIC BLOOD PRESSURE: 88 MMHG | OXYGEN SATURATION: 96 % | WEIGHT: 224.6 LBS | SYSTOLIC BLOOD PRESSURE: 140 MMHG | HEART RATE: 89 BPM | HEIGHT: 66 IN | RESPIRATION RATE: 16 BRPM | TEMPERATURE: 97.1 F | BODY MASS INDEX: 36.1 KG/M2

## 2024-02-14 DIAGNOSIS — E66.09 CLASS 2 OBESITY DUE TO EXCESS CALORIES WITHOUT SERIOUS COMORBIDITY WITH BODY MASS INDEX (BMI) OF 35.0 TO 35.9 IN ADULT: Primary | ICD-10-CM

## 2024-02-14 RX ORDER — VITAMIN B COMPLEX
CAPSULE ORAL DAILY
COMMUNITY

## 2024-02-21 ENCOUNTER — TELEPHONE (OUTPATIENT)
Dept: FAMILY MEDICINE CLINIC | Facility: CLINIC | Age: 64
End: 2024-02-21
Payer: COMMERCIAL

## 2024-02-21 NOTE — TELEPHONE ENCOUNTER
Caller: Festus French    Relationship to patient: Self    Best call back number: [phone]     Patient is needing: PATIENT RECEIVED NOTICE FROM PHARMACY THAT A PRIOR AUTHORIZATION IS NEEDED FOR PHENTERMINE-TOPIRAMATE 3.75-23 MG CAPSULES. PATIENT STATES PHARMACY SENT REQUEST TO OFFICE BUT HAVE NOT HEARD BACK. MEDICATION PRESCRIBED ON 02.14.2024

## 2024-03-07 ENCOUNTER — OFFICE VISIT (OUTPATIENT)
Dept: ONCOLOGY | Facility: CLINIC | Age: 64
End: 2024-03-07
Payer: COMMERCIAL

## 2024-03-07 ENCOUNTER — LAB (OUTPATIENT)
Dept: LAB | Facility: HOSPITAL | Age: 64
End: 2024-03-07
Payer: COMMERCIAL

## 2024-03-07 VITALS
OXYGEN SATURATION: 98 % | WEIGHT: 225 LBS | RESPIRATION RATE: 18 BRPM | HEIGHT: 66 IN | BODY MASS INDEX: 36.16 KG/M2 | SYSTOLIC BLOOD PRESSURE: 144 MMHG | DIASTOLIC BLOOD PRESSURE: 89 MMHG | TEMPERATURE: 97.8 F | HEART RATE: 83 BPM

## 2024-03-07 DIAGNOSIS — D69.6 THROMBOCYTOPENIA, ACQUIRED: Primary | ICD-10-CM

## 2024-03-07 DIAGNOSIS — D69.6 THROMBOCYTOPENIA, ACQUIRED: ICD-10-CM

## 2024-03-07 LAB
BASOPHILS # BLD AUTO: 0.05 10*3/MM3 (ref 0–0.2)
BASOPHILS NFR BLD AUTO: 1.2 % (ref 0–1.5)
DEPRECATED RDW RBC AUTO: 39 FL (ref 37–54)
EOSINOPHIL # BLD AUTO: 0.1 10*3/MM3 (ref 0–0.4)
EOSINOPHIL NFR BLD AUTO: 2.3 % (ref 0.3–6.2)
ERYTHROCYTE [DISTWIDTH] IN BLOOD BY AUTOMATED COUNT: 13.2 % (ref 12.3–15.4)
HCT VFR BLD AUTO: 44.7 % (ref 34–46.6)
HGB BLD-MCNC: 15.5 G/DL (ref 12–15.9)
IMM GRANULOCYTES # BLD AUTO: 0.05 10*3/MM3 (ref 0–0.05)
IMM GRANULOCYTES NFR BLD AUTO: 1.2 % (ref 0–0.5)
LYMPHOCYTES # BLD AUTO: 1.6 10*3/MM3 (ref 0.7–3.1)
LYMPHOCYTES NFR BLD AUTO: 37.4 % (ref 19.6–45.3)
MCH RBC QN AUTO: 28.5 PG (ref 26.6–33)
MCHC RBC AUTO-ENTMCNC: 34.7 G/DL (ref 31.5–35.7)
MCV RBC AUTO: 82.3 FL (ref 79–97)
MONOCYTES # BLD AUTO: 0.42 10*3/MM3 (ref 0.1–0.9)
MONOCYTES NFR BLD AUTO: 9.8 % (ref 5–12)
NEUTROPHILS NFR BLD AUTO: 2.06 10*3/MM3 (ref 1.7–7)
NEUTROPHILS NFR BLD AUTO: 48.1 % (ref 42.7–76)
NRBC BLD AUTO-RTO: 0 /100 WBC (ref 0–0.2)
PLATELET # BLD AUTO: 178 10*3/MM3 (ref 140–450)
PMV BLD AUTO: 13.9 FL (ref 6–12)
RBC # BLD AUTO: 5.43 10*6/MM3 (ref 3.77–5.28)
WBC NRBC COR # BLD AUTO: 4.28 10*3/MM3 (ref 3.4–10.8)

## 2024-03-07 PROCEDURE — 85025 COMPLETE CBC W/AUTO DIFF WBC: CPT

## 2024-03-07 NOTE — PROGRESS NOTES
REASONS FOR FOLLOW UP:  LEUKOPENIA AND LYMPHOCYTOSIS.MARGINAL LOW B12 LEVEL.mild thrombocytopenia    HISTORY OF PRESENT ILLNESS:     On 03/07/2024 this 64-year-old female returns to the office for minimal leukopenia and thrombocytopenia in the past. At this time she feels fine, the only problem is too much appetite and weight gain. Besides this proper bowel activity, proper urination, no abdominal pain, no jaundice, no fevers, no chills, no sweats, no bleeding, no infections. No adenopathy, no rashes. No cough, no shortness of breath, no cardiac issues. Blood pressure remains minimally elevated but she is taking medicine for this.                Past Medical History:   Diagnosis Date    Allergic Asa, Pcn/ childhood    Anxiety 2015    Arthritis 2020    Benign heart murmur     Colon polyp 2021    Diverticulosis 2021    HL (hearing loss) 1983    Hypertension     Hypothyroidism     Keloid     Leukopenia     Obesity 1990    Osteopenia     Sickle cell anemia 1961    Sickle cell anemia without crisis     SICKLE CELL TRAIT ONLY     Past Surgical History:   Procedure Laterality Date    BILATERAL BREAST REDUCTION Bilateral 2013    BREAST SURGERY  2015    COLONOSCOPY  01/2021    HYSTERECTOMY  1997           MEDICATIONS    Current Outpatient Medications:     amLODIPine (NORVASC) 10 MG tablet, TAKE 1 TABLET BY MOUTH DAILY, Disp: 90 tablet, Rfl: 1    azelastine (ASTELIN) 0.1 % nasal spray, 2 sprays into the nostril(s) as directed by provider 2 (Two) Times a Day. Use in each nostril as directed (Patient taking differently: 2 sprays into the nostril(s) as directed by provider 2 (Two) Times a Day As Needed. Use in each nostril as directed), Disp: 30 mL, Rfl: 12    B Complex Vitamins (vitamin b complex) capsule capsule, Take  by mouth Daily., Disp: , Rfl:     buPROPion XL (WELLBUTRIN XL) 300 MG 24 hr tablet, TAKE 1 TABLET BY MOUTH DAILY, Disp: 90 tablet, Rfl: 1    busPIRone (BUSPAR) 10 MG tablet, TAKE 1 TABLET BY MOUTH THREE  TIMES DAILY, Disp: 180 tablet, Rfl: 1    CALCIUM PO, Take 600 mg by mouth Daily., Disp: , Rfl:     cloNIDine (CATAPRES) 0.3 MG tablet, TAKE 1 TABLET BY MOUTH DAILY, Disp: 90 tablet, Rfl: 1    fosinopril (MONOPRIL) 40 MG tablet, TAKE 1 TABLET BY MOUTH DAILY, Disp: 90 tablet, Rfl: 1    hydroCHLOROthiazide (HYDRODIURIL) 50 MG tablet, TAKE 1 TABLET BY MOUTH DAILY, Disp: 90 tablet, Rfl: 1    Insulin Pen Needle (Pen Needles) 32G X 4 MM misc, 1 each 1 (One) Time Per Week., Disp: 30 each, Rfl: 1    levothyroxine (SYNTHROID, LEVOTHROID) 137 MCG tablet, Take 1 tablet by mouth Daily., Disp: 90 tablet, Rfl: 1    Phentermine-Topiramate 3.75-23 MG capsule sustained-release 24 hr, Take 1 each by mouth Daily., Disp: 30 capsule, Rfl: 0    potassium chloride 10 MEQ CR tablet, TAKE 1 TABLET BY MOUTH DAILY, Disp: 90 tablet, Rfl: 1    ALLERGIES:     Allergies   Allergen Reactions    Morphine GI Intolerance    Penicillins Unknown - Low Severity       SOCIAL HISTORY:       Social History     Socioeconomic History    Marital status:     Number of children: 0    Years of education: COLLEGE   Tobacco Use    Smoking status: Former     Current packs/day: 0.00     Types: Cigarettes     Quit date: 2016     Years since quittin.9    Smokeless tobacco: Never    Tobacco comments:     Quit 17   Vaping Use    Vaping status: Never Used   Substance and Sexual Activity    Alcohol use: Yes     Comment: socially    Drug use: No    Sexual activity: Yes     Partners: Male         FAMILY HISTORY:  Family History   Problem Relation Age of Onset    Hypertension Mother     Diabetes Mother     Breast cancer Mother 65    COPD Mother     Arthritis Mother     Heart disease Mother     Alcohol abuse Father     Cirrhosis Father     Hypertension Sister     Fibromyalgia Sister     Diabetes Sister     Stroke Sister     Aneurysm Sister     Hypertension Brother     Heart disease Brother     Diabetes Brother     Sarcoidosis Sister          Objective   "  Vitals:    03/07/24 0835   BP: 144/89   Pulse: 83   Resp: 18   Temp: 97.8 °F (36.6 °C)   TempSrc: Temporal   SpO2: 98%   Weight: 102 kg (225 lb)   Height: 167.6 cm (65.98\")   PainSc: 0-No pain         3/7/2024     8:29 AM   Current Status   ECOG score 0      PHYSICAL EXAM:           GENERAL:  Well-developed, Patient  in no acute distress.   SKIN:  Warm, dry ,NO purpura ,no rash.  HEENT:  Pupils were equal and reactive to light and accomodation, conjunctivae noninjected,  normal visual acuity.   NECK:  Supple with good range of motion; no thyromegaly , no JVD or bruits,.No carotid artery pain, no carotid abnormal pulsation   LYMPHATICS:  No cervical, NO supraclavicular, NO axillary, NO inguinal adenopathies.  CARDIAC   normal rate , regular rhythm, without murmur,NO rubs NO S3 NO S4   LUNGS: normal breath sounds bilateral, no wheezing, NO rhonchi, NO crackles ,NO rubs.  VASCULAR VENOUS: no cyanosis, NO collateral circulation, NO varicosities, NO edema, NO palpable cords, NO pain,NO erythema, NO pigmentation of the skin.  ABDOMEN:  Soft, NO pain,no hepatomegaly, no splenomegaly,no masses, no ascites, no collateral circulation,no distention.  EXTREMITIES  AND SPINE:  No clubbing, no cyanosis ,no deformities , no pain .No kyphosis,  no pain in spine, no pain in ribs , no pain in pelvic bone.  NEUROLOGICAL:  Patient was awake, alert, oriented to time, person and place.                  Labs:   Lab Results   Component Value Date    WBC 4.28 03/07/2024    HGB 15.5 03/07/2024    HCT 44.7 03/07/2024    MCV 82.3 03/07/2024     03/07/2024         Assessment/Plan    This patient has minimal leukopenia with lymphocytosis, otherwise asymptomatic, and is very likely that this is representation of leukopenia of .  We have tested this patient during the original consultation for hemoglobinopathy, having a normal hemoglobin electrophoresis.  Also the patient had a normal ferritin, normal iron profile, normal " folic acid level and a marginally low B12 level.  The patient had serum protein electrophoresis and immunofixation that were negative for monoclonal proteins.  Also the patient had peripheral blood flow cytometry that failed to document any alteration of monoclonality in regard to her lymphocyte population.    The patient returned to the office on 02/04/2021 with no symptoms of anything. Her physical exam was unremarkable. Her white count, platelet count and hemoglobin remain about the same as before. She is up to date in colonoscopy and mammogram. Her overall health remains excellent. I have not advised her to change anything and I would like to review her back in a year.    The patient was reviewed on 02/16/2022. She is completely asymptomatic. Her clinical examination only shows a minimal systolic murmur at the base, grade 2, with no implications that she has had for a long time. She has seen her cardiologist last year. The rest of her clinical exam is normal. She is asymptomatic. Her white count, hemoglobin and platelets are back to normal at this time. She still has minimal peripheral lymphocytosis. This has been studied in the past and has not had any implications.     On 02/23/2023 the patient was further reviewed. She is completely asymptomatic with a normal clinical examination, no splenomegaly, peripheral adenopathy. The rest of her clinical examination is normal besides the typical hypertension that she has when she comes to this office.     Her white count is 3500, hemoglobin 14.1, platelet count 200,000. The white count differential is no different than what it has been in the past.     Given the fact that she remains asymptomatic with a normal clinical examination and stable laboratory parameters we advised her to remain in observation. She will return to see us back in a year with a CBC.     On 03/07/2024 the patient has no symptoms of anything in particular. Her clinical examination is normal,  furthermore her white count, white count differential, hemoglobin and platelet count are completely normal. Given the circumstances I do not believe that the patient needs to continue coming to see us in the future. If any other issues arise we will be glad to review her again. Her diet seems to be appropriate, maybe too much food. She is aware of that and she is working on controlling the weight in the best way that she can.     She has been released from the practice.

## 2024-03-12 DIAGNOSIS — F41.9 ANXIETY: ICD-10-CM

## 2024-03-12 DIAGNOSIS — I10 ESSENTIAL HYPERTENSION: ICD-10-CM

## 2024-03-12 DIAGNOSIS — E07.9 DISORDER OF THYROID: ICD-10-CM

## 2024-03-12 RX ORDER — HYDROCHLOROTHIAZIDE 50 MG/1
50 TABLET ORAL DAILY
Qty: 90 TABLET | Refills: 1 | Status: SHIPPED | OUTPATIENT
Start: 2024-03-12

## 2024-03-12 RX ORDER — LEVOTHYROXINE SODIUM 137 UG/1
137 TABLET ORAL DAILY
Qty: 90 TABLET | Refills: 1 | Status: SHIPPED | OUTPATIENT
Start: 2024-03-12

## 2024-03-12 RX ORDER — BUPROPION HYDROCHLORIDE 300 MG/1
TABLET ORAL
Qty: 90 TABLET | Refills: 1 | Status: SHIPPED | OUTPATIENT
Start: 2024-03-12

## 2024-03-12 RX ORDER — FOSINOPRIL SODIUM 40 MG/1
40 TABLET ORAL DAILY
Qty: 90 TABLET | Refills: 1 | Status: SHIPPED | OUTPATIENT
Start: 2024-03-12 | End: 2024-03-14 | Stop reason: SDUPTHER

## 2024-03-14 ENCOUNTER — OFFICE VISIT (OUTPATIENT)
Dept: FAMILY MEDICINE CLINIC | Facility: CLINIC | Age: 64
End: 2024-03-14
Payer: COMMERCIAL

## 2024-03-14 VITALS
RESPIRATION RATE: 18 BRPM | SYSTOLIC BLOOD PRESSURE: 138 MMHG | BODY MASS INDEX: 36.48 KG/M2 | OXYGEN SATURATION: 97 % | HEIGHT: 66 IN | WEIGHT: 227 LBS | TEMPERATURE: 97.1 F | HEART RATE: 79 BPM | DIASTOLIC BLOOD PRESSURE: 80 MMHG

## 2024-03-14 DIAGNOSIS — E66.09 CLASS 2 OBESITY DUE TO EXCESS CALORIES WITHOUT SERIOUS COMORBIDITY WITH BODY MASS INDEX (BMI) OF 35.0 TO 35.9 IN ADULT: Primary | ICD-10-CM

## 2024-03-14 DIAGNOSIS — I10 ESSENTIAL HYPERTENSION: ICD-10-CM

## 2024-03-14 RX ORDER — PHENTERMINE AND TOPIRAMATE 7.5; 46 MG/1; MG/1
1 CAPSULE, EXTENDED RELEASE ORAL DAILY
Qty: 30 CAPSULE | Refills: 0 | Status: SHIPPED | OUTPATIENT
Start: 2024-03-14

## 2024-03-14 RX ORDER — FOSINOPRIL SODIUM 40 MG/1
40 TABLET ORAL DAILY
Qty: 90 TABLET | Refills: 1 | Status: SHIPPED | OUTPATIENT
Start: 2024-03-14

## 2024-03-14 NOTE — PROGRESS NOTES
Chief Complaint  Obesity    Subjective        Festus French presents to [facility]  History of Present Illness  Obesity:  denies chest pain or shortness.  Has limited bread and sodas and increased exercise.  Obesity  Pertinent negatives include no chest pain, coughing, fever, numbness or weakness.       The following portions of the patient's history were personally reviewed and updated as appropriate: allergies, current medications, past medical history, past surgical history, past family history, and past social history.     Body mass index is 36.66 kg/m².           Past History:    Medical History: has a past medical history of Allergic (Asa, Pcn/ childhood), Anxiety (2015), Arthritis (2020), Benign heart murmur, Colon polyp (2021), Diverticulosis (2021), HL (hearing loss) (1983), Hypertension, Hypothyroidism, Keloid, Leukopenia, Obesity (1990), Osteopenia, Sickle cell anemia (1961), and Sickle cell anemia without crisis.     Surgical History: has a past surgical history that includes Breast Reduction (Bilateral, 2013); Hysterectomy (1997); Colonoscopy (01/2021); and Breast surgery (2015).     Family History: family history includes Alcohol abuse in her father; Aneurysm in her sister; Arthritis in her mother; Breast cancer (age of onset: 65) in her mother; COPD in her mother; Cirrhosis in her father; Diabetes in her brother, mother, and sister; Fibromyalgia in her sister; Heart disease in her brother and mother; Hypertension in her brother, mother, and sister; Sarcoidosis in her sister; Stroke in her sister.     Social History: reports that she quit smoking about 7 years ago. Her smoking use included cigarettes. She has never used smokeless tobacco. She reports current alcohol use. She reports that she does not use drugs.    Allergies: Morphine and Penicillins          Current Outpatient Medications:     amLODIPine (NORVASC) 10 MG tablet, TAKE 1 TABLET BY MOUTH DAILY, Disp: 90  tablet, Rfl: 1    B Complex Vitamins (vitamin b complex) capsule capsule, Take  by mouth Daily., Disp: , Rfl:     buPROPion XL (WELLBUTRIN XL) 300 MG 24 hr tablet, TAKE 1 TABLET BY MOUTH DAILY, Disp: 90 tablet, Rfl: 1    busPIRone (BUSPAR) 10 MG tablet, TAKE 1 TABLET BY MOUTH THREE TIMES DAILY (Patient taking differently: Only taking once a day), Disp: 180 tablet, Rfl: 1    CALCIUM PO, Take 600 mg by mouth Daily., Disp: , Rfl:     cloNIDine (CATAPRES) 0.3 MG tablet, TAKE 1 TABLET BY MOUTH DAILY, Disp: 90 tablet, Rfl: 1    fosinopril (MONOPRIL) 40 MG tablet, Take 1 tablet by mouth Daily., Disp: 90 tablet, Rfl: 1    hydroCHLOROthiazide 50 MG tablet, TAKE 1 TABLET BY MOUTH DAILY., Disp: 90 tablet, Rfl: 1    levothyroxine (SYNTHROID, LEVOTHROID) 137 MCG tablet, TAKE 1 TABLET BY MOUTH DAILY, Disp: 90 tablet, Rfl: 1    potassium chloride 10 MEQ CR tablet, TAKE 1 TABLET BY MOUTH DAILY, Disp: 90 tablet, Rfl: 1    azelastine (ASTELIN) 0.1 % nasal spray, 2 sprays into the nostril(s) as directed by provider 2 (Two) Times a Day. Use in each nostril as directed (Patient not taking: Reported on 3/14/2024), Disp: 30 mL, Rfl: 12    Insulin Pen Needle (Pen Needles) 32G X 4 MM misc, 1 each 1 (One) Time Per Week. (Patient not taking: Reported on 3/14/2024), Disp: 30 each, Rfl: 1    Phentermine-Topiramate (Qsymia) 7.5-46 MG capsule sustained-release 24 hr, Take 1 dose by mouth Daily., Disp: 30 capsule, Rfl: 0    Medications Discontinued During This Encounter   Medication Reason    fosinopril (MONOPRIL) 40 MG tablet Reorder    Phentermine-Topiramate 3.75-23 MG capsule sustained-release 24 hr *Therapy completed         Review of Systems   Constitutional:  Negative for fever.   Respiratory:  Negative for cough and shortness of breath.    Cardiovascular:  Negative for chest pain, palpitations and leg swelling.   Neurological:  Negative for dizziness, weakness, numbness and headache.        Objective         Vitals:    03/14/24 0719  "03/14/24 0801   BP: 150/90 138/80   BP Location: Right arm Right arm   Patient Position: Sitting Sitting   Cuff Size: Large Adult Large Adult   Pulse: 79    Resp: 18    Temp: 97.1 °F (36.2 °C)    TempSrc: Temporal    SpO2: 97%    Weight: 103 kg (227 lb)    Height: 167.6 cm (65.98\")      Body mass index is 36.66 kg/m².         Physical Exam  Vitals reviewed.   Constitutional:       Appearance: Normal appearance. She is well-developed.   HENT:      Head: Normocephalic and atraumatic.      Mouth/Throat:      Pharynx: No oropharyngeal exudate.   Eyes:      Conjunctiva/sclera: Conjunctivae normal.      Pupils: Pupils are equal, round, and reactive to light.   Cardiovascular:      Rate and Rhythm: Normal rate and regular rhythm.      Heart sounds: Normal heart sounds. No murmur heard.     No friction rub. No gallop.   Pulmonary:      Effort: Pulmonary effort is normal.      Breath sounds: Normal breath sounds. No wheezing or rhonchi.   Skin:     General: Skin is warm and dry.   Neurological:      Mental Status: She is alert and oriented to person, place, and time.   Psychiatric:         Mood and Affect: Mood and affect normal.         Behavior: Behavior normal.         Thought Content: Thought content normal.         Judgment: Judgment normal.             Result Review :               Assessment and Plan     Diagnoses and all orders for this visit:    1. Class 2 obesity due to excess calories without serious comorbidity with body mass index (BMI) of 35.0 to 35.9 in adult (Primary)  -     Phentermine-Topiramate (Qsymia) 7.5-46 MG capsule sustained-release 24 hr; Take 1 dose by mouth Daily.  Dispense: 30 capsule; Refill: 0    2. Essential hypertension  Comments:  Continue fosinopril, norvasc, clonidine, hctz at current dose.  Orders:  -     fosinopril (MONOPRIL) 40 MG tablet; Take 1 tablet by mouth Daily.  Dispense: 90 tablet; Refill: 1              Follow Up     No follow-ups on file.    Patient was given instructions and " counseling regarding her condition or for health maintenance advice. Please see specific information pulled into the AVS if appropriate.

## 2024-04-06 ENCOUNTER — LAB (OUTPATIENT)
Dept: LAB | Facility: HOSPITAL | Age: 64
End: 2024-04-06
Payer: COMMERCIAL

## 2024-04-06 DIAGNOSIS — E07.9 DISORDER OF THYROID: ICD-10-CM

## 2024-04-06 DIAGNOSIS — I10 PRIMARY HYPERTENSION: ICD-10-CM

## 2024-04-06 LAB
ALBUMIN SERPL-MCNC: 4.5 G/DL (ref 3.5–5.2)
ALBUMIN/GLOB SERPL: 1.7 G/DL
ALP SERPL-CCNC: 82 U/L (ref 39–117)
ALT SERPL W P-5'-P-CCNC: 21 U/L (ref 1–33)
ANION GAP SERPL CALCULATED.3IONS-SCNC: 12 MMOL/L (ref 5–15)
AST SERPL-CCNC: 16 U/L (ref 1–32)
BACTERIA UR QL AUTO: ABNORMAL /HPF
BILIRUB SERPL-MCNC: 0.5 MG/DL (ref 0–1.2)
BILIRUB UR QL STRIP: NEGATIVE
BUN SERPL-MCNC: 11 MG/DL (ref 8–23)
BUN/CREAT SERPL: 11.6 (ref 7–25)
CALCIUM SPEC-SCNC: 9.6 MG/DL (ref 8.6–10.5)
CHLORIDE SERPL-SCNC: 103 MMOL/L (ref 98–107)
CHOLEST SERPL-MCNC: 189 MG/DL (ref 0–200)
CLARITY UR: ABNORMAL
CO2 SERPL-SCNC: 29 MMOL/L (ref 22–29)
COLOR UR: ABNORMAL
CREAT SERPL-MCNC: 0.95 MG/DL (ref 0.57–1)
DEPRECATED RDW RBC AUTO: 39.8 FL (ref 37–54)
EGFRCR SERPLBLD CKD-EPI 2021: 67.5 ML/MIN/1.73
ERYTHROCYTE [DISTWIDTH] IN BLOOD BY AUTOMATED COUNT: 13.3 % (ref 12.3–15.4)
GLOBULIN UR ELPH-MCNC: 2.7 GM/DL
GLUCOSE SERPL-MCNC: 93 MG/DL (ref 65–99)
GLUCOSE UR STRIP-MCNC: NEGATIVE MG/DL
HCT VFR BLD AUTO: 44.9 % (ref 34–46.6)
HDLC SERPL QL: 4.2
HDLC SERPL-MCNC: 45 MG/DL (ref 40–60)
HGB BLD-MCNC: 14.8 G/DL (ref 12–15.9)
HGB UR QL STRIP.AUTO: NEGATIVE
HOLD SPECIMEN: NORMAL
HYALINE CASTS UR QL AUTO: ABNORMAL /LPF
KETONES UR QL STRIP: NEGATIVE
LDLC SERPL CALC-MCNC: 125 MG/DL (ref 0–100)
LEUKOCYTE ESTERASE UR QL STRIP.AUTO: ABNORMAL
MCH RBC QN AUTO: 27.2 PG (ref 26.6–33)
MCHC RBC AUTO-ENTMCNC: 33 G/DL (ref 31.5–35.7)
MCV RBC AUTO: 82.5 FL (ref 79–97)
NITRITE UR QL STRIP: NEGATIVE
PH UR STRIP.AUTO: 6 [PH] (ref 5–8)
PLATELET # BLD AUTO: 198 10*3/MM3 (ref 140–450)
PMV BLD AUTO: 13.9 FL (ref 6–12)
POTASSIUM SERPL-SCNC: 3.6 MMOL/L (ref 3.5–5.2)
PROT SERPL-MCNC: 7.2 G/DL (ref 6–8.5)
PROT UR QL STRIP: ABNORMAL
RBC # BLD AUTO: 5.44 10*6/MM3 (ref 3.77–5.28)
RBC # UR STRIP: ABNORMAL /HPF
REF LAB TEST METHOD: ABNORMAL
SODIUM SERPL-SCNC: 144 MMOL/L (ref 136–145)
SP GR UR STRIP: 1.03 (ref 1–1.03)
SQUAMOUS #/AREA URNS HPF: ABNORMAL /HPF
TRIGL SERPL-MCNC: 104 MG/DL (ref 0–150)
TSH SERPL DL<=0.05 MIU/L-ACNC: 6.86 UIU/ML (ref 0.27–4.2)
UROBILINOGEN UR QL STRIP: ABNORMAL
VLDLC SERPL-MCNC: 19 MG/DL (ref 5–40)
WBC # UR STRIP: ABNORMAL /HPF
WBC NRBC COR # BLD AUTO: 4.36 10*3/MM3 (ref 3.4–10.8)

## 2024-04-06 PROCEDURE — 80050 GENERAL HEALTH PANEL: CPT

## 2024-04-06 PROCEDURE — 36415 COLL VENOUS BLD VENIPUNCTURE: CPT

## 2024-04-06 PROCEDURE — 87086 URINE CULTURE/COLONY COUNT: CPT

## 2024-04-06 PROCEDURE — 80061 LIPID PANEL: CPT

## 2024-04-06 PROCEDURE — 81001 URINALYSIS AUTO W/SCOPE: CPT

## 2024-04-07 LAB — BACTERIA SPEC AEROBE CULT: NORMAL

## 2024-04-10 ENCOUNTER — OFFICE VISIT (OUTPATIENT)
Dept: FAMILY MEDICINE CLINIC | Facility: CLINIC | Age: 64
End: 2024-04-10
Payer: COMMERCIAL

## 2024-04-10 VITALS
HEART RATE: 82 BPM | WEIGHT: 228.2 LBS | SYSTOLIC BLOOD PRESSURE: 130 MMHG | RESPIRATION RATE: 16 BRPM | OXYGEN SATURATION: 96 % | HEIGHT: 66 IN | DIASTOLIC BLOOD PRESSURE: 78 MMHG | BODY MASS INDEX: 36.67 KG/M2 | TEMPERATURE: 96.4 F

## 2024-04-10 DIAGNOSIS — F41.9 ANXIETY: ICD-10-CM

## 2024-04-10 DIAGNOSIS — E66.09 CLASS 2 OBESITY DUE TO EXCESS CALORIES WITHOUT SERIOUS COMORBIDITY WITH BODY MASS INDEX (BMI) OF 35.0 TO 35.9 IN ADULT: Primary | ICD-10-CM

## 2024-04-10 DIAGNOSIS — E07.9 DISORDER OF THYROID: ICD-10-CM

## 2024-04-10 DIAGNOSIS — I10 PRIMARY HYPERTENSION: ICD-10-CM

## 2024-04-10 RX ORDER — LEVOTHYROXINE SODIUM 0.15 MG/1
150 TABLET ORAL EVERY OTHER DAY
Qty: 45 TABLET | Refills: 1 | Status: SHIPPED | OUTPATIENT
Start: 2024-04-10

## 2024-04-10 RX ORDER — LEVOTHYROXINE SODIUM 137 UG/1
137 TABLET ORAL EVERY OTHER DAY
Qty: 45 TABLET | Refills: 1 | Status: SHIPPED | OUTPATIENT
Start: 2024-04-10

## 2024-04-10 NOTE — PROGRESS NOTES
Chief Complaint  Obesity    Subjective        Festus French presents to [facility]  History of Present Illness  Obesity:  has been exercising more and walking and pushing mower and no weight loss.  Denies shortness of breath or chest pain.     Hypertension:  doing well on medication 130/78.    Anxiety:  doing well on medication.      Obesity  Pertinent negatives include no chest pain, coughing, fever, numbness or weakness.   Hypertension  Associated symptoms include anxiety. Pertinent negatives include no chest pain, palpitations or shortness of breath.   Anxiety  Symptoms include dizziness. Patient reports no chest pain, palpitations or shortness of breath.       Dizziness  Pertinent negatives include no chest pain, coughing, fever, numbness or weakness.       The following portions of the patient's history were personally reviewed and updated as appropriate: allergies, current medications, past medical history, past surgical history, past family history, and past social history.     Body mass index is 36.85 kg/m².           Past History:    Medical History: has a past medical history of Allergic (Asa, Pcn/ childhood), Anxiety (2015), Arthritis (2020), Benign heart murmur, Colon polyp (2021), Diverticulosis (2021), HL (hearing loss) (1983), Hypertension, Hypothyroidism, Keloid, Leukopenia, Obesity (1990), Osteopenia, Sickle cell anemia (1961), and Sickle cell anemia without crisis.     Surgical History: has a past surgical history that includes Breast Reduction (Bilateral, 2013); Hysterectomy (1997); Colonoscopy (01/2021); and Breast surgery (2015).     Family History: family history includes Alcohol abuse in her father; Aneurysm in her sister; Arthritis in her mother; Breast cancer (age of onset: 65) in her mother; COPD in her mother; Cirrhosis in her father; Diabetes in her brother, mother, and sister; Fibromyalgia in her sister; Heart disease in her brother and mother;  Hypertension in her brother, mother, and sister; Sarcoidosis in her sister; Stroke in her sister.     Social History: reports that she quit smoking about 7 years ago. Her smoking use included cigarettes. She started smoking about 41 years ago. She has a 6.6 pack-year smoking history. She has been exposed to tobacco smoke. She has never used smokeless tobacco. She reports current alcohol use. She reports that she does not use drugs.    Allergies: Morphine and Penicillins          Current Outpatient Medications:     amLODIPine (NORVASC) 10 MG tablet, TAKE 1 TABLET BY MOUTH DAILY, Disp: 90 tablet, Rfl: 1    B Complex Vitamins (vitamin b complex) capsule capsule, Take  by mouth Daily., Disp: , Rfl:     buPROPion XL (WELLBUTRIN XL) 300 MG 24 hr tablet, TAKE 1 TABLET BY MOUTH DAILY, Disp: 90 tablet, Rfl: 1    CALCIUM PO, Take 600 mg by mouth Daily., Disp: , Rfl:     cloNIDine (CATAPRES) 0.3 MG tablet, TAKE 1 TABLET BY MOUTH DAILY, Disp: 90 tablet, Rfl: 1    fosinopril (MONOPRIL) 40 MG tablet, Take 1 tablet by mouth Daily., Disp: 90 tablet, Rfl: 1    hydroCHLOROthiazide 50 MG tablet, TAKE 1 TABLET BY MOUTH DAILY., Disp: 90 tablet, Rfl: 1    levothyroxine (SYNTHROID, LEVOTHROID) 137 MCG tablet, Take 1 tablet by mouth Every Other Day., Disp: 45 tablet, Rfl: 1    potassium chloride 10 MEQ CR tablet, TAKE 1 TABLET BY MOUTH DAILY, Disp: 90 tablet, Rfl: 1    azelastine (ASTELIN) 0.1 % nasal spray, 2 sprays into the nostril(s) as directed by provider 2 (Two) Times a Day. Use in each nostril as directed (Patient not taking: Reported on 3/14/2024), Disp: 30 mL, Rfl: 12    busPIRone (BUSPAR) 10 MG tablet, TAKE 1 TABLET BY MOUTH THREE TIMES DAILY (Patient not taking: Reported on 4/10/2024), Disp: 180 tablet, Rfl: 1    Insulin Pen Needle (Pen Needles) 32G X 4 MM misc, 1 each 1 (One) Time Per Week. (Patient not taking: Reported on 3/14/2024), Disp: 30 each, Rfl: 1    levothyroxine (Synthroid) 150 MCG tablet, Take 1 tablet by mouth  "Every Other Day., Disp: 45 tablet, Rfl: 1    Phentermine-Topiramate 11.25-69 MG capsule sustained-release 24 hr, Take 1 each by mouth Daily., Disp: 30 capsule, Rfl: 0    Medications Discontinued During This Encounter   Medication Reason    Phentermine-Topiramate (Qsymia) 7.5-46 MG capsule sustained-release 24 hr *Therapy completed    levothyroxine (SYNTHROID, LEVOTHROID) 137 MCG tablet Reorder         Review of Systems   Constitutional:  Negative for fever.   Respiratory:  Negative for cough and shortness of breath.    Cardiovascular:  Negative for chest pain and palpitations.   Neurological:  Positive for dizziness. Negative for weakness and numbness.        Objective         Vitals:    04/10/24 0757   BP: 130/78   BP Location: Right arm   Patient Position: Sitting   Cuff Size: Adult   Pulse: 82   Resp: 16   Temp: 96.4 °F (35.8 °C)   TempSrc: Temporal   SpO2: 96%   Weight: 104 kg (228 lb 3.2 oz)   Height: 167.6 cm (65.98\")     Body mass index is 36.85 kg/m².         Physical Exam  Vitals reviewed.   Constitutional:       Appearance: Normal appearance. She is well-developed.   HENT:      Head: Normocephalic and atraumatic.      Mouth/Throat:      Pharynx: No oropharyngeal exudate.   Eyes:      Conjunctiva/sclera: Conjunctivae normal.      Pupils: Pupils are equal, round, and reactive to light.   Cardiovascular:      Rate and Rhythm: Normal rate and regular rhythm.      Heart sounds: Normal heart sounds. No murmur heard.     No friction rub. No gallop.   Pulmonary:      Effort: Pulmonary effort is normal.      Breath sounds: Normal breath sounds. No wheezing or rhonchi.   Skin:     General: Skin is warm and dry.   Neurological:      Mental Status: She is alert and oriented to person, place, and time.   Psychiatric:         Mood and Affect: Mood and affect normal.         Behavior: Behavior normal.         Thought Content: Thought content normal.         Judgment: Judgment normal.             Result Review :    The " 10-year ASCVD risk score (Hal MARION, et al., 2019) is: 8.7%    Values used to calculate the score:      Age: 64 years      Sex: Female      Is Non- : Yes      Diabetic: No      Tobacco smoker: No      Systolic Blood Pressure: 130 mmHg      Is BP treated: Yes      HDL Cholesterol: 45 mg/dL      Total Cholesterol: 189 mg/dL             Assessment and Plan     Diagnoses and all orders for this visit:    1. Class 2 obesity due to excess calories without serious comorbidity with body mass index (BMI) of 35.0 to 35.9 in adult (Primary)  -     Phentermine-Topiramate 11.25-69 MG capsule sustained-release 24 hr; Take 1 each by mouth Daily.  Dispense: 30 capsule; Refill: 0    2. Primary hypertension  -     Lipid Panel With / Chol / HDL Ratio; Future  -     Comprehensive Metabolic Panel; Future  -     CBC (No Diff); Future  -     Urinalysis With Culture If Indicated -; Future    3. Disorder of thyroid  -     TSH; Future  -     TSH; Future  -     levothyroxine (Synthroid) 150 MCG tablet; Take 1 tablet by mouth Every Other Day.  Dispense: 45 tablet; Refill: 1  -     levothyroxine (SYNTHROID, LEVOTHROID) 137 MCG tablet; Take 1 tablet by mouth Every Other Day.  Dispense: 45 tablet; Refill: 1    4. Anxiety    5. Disorder of thyroid  Comments:  contineu levoithyroxine at current dose.  Orders:  -     TSH; Future  -     TSH; Future  -     levothyroxine (Synthroid) 150 MCG tablet; Take 1 tablet by mouth Every Other Day.  Dispense: 45 tablet; Refill: 1  -     levothyroxine (SYNTHROID, LEVOTHROID) 137 MCG tablet; Take 1 tablet by mouth Every Other Day.  Dispense: 45 tablet; Refill: 1    Will try red yeast rice for lowering ldl.          Follow Up     No follow-ups on file.    Patient was given instructions and counseling regarding her condition or for health maintenance advice. Please see specific information pulled into the AVS if appropriate.

## 2024-04-11 ENCOUNTER — TELEPHONE (OUTPATIENT)
Dept: FAMILY MEDICINE CLINIC | Facility: CLINIC | Age: 64
End: 2024-04-11
Payer: COMMERCIAL

## 2024-04-11 NOTE — TELEPHONE ENCOUNTER
Let pt know instructions of thyroid medication, pt stated she is having a foul odor when voiding, but no burning or other issues.  Told her to drink plenty of water and if she gets burning or pain let us know.

## 2024-04-11 NOTE — TELEPHONE ENCOUNTER
Caller: Festus French    Relationship: Self    Best call back number: [phone]    What is the best time to reach you: ANYTIME    Who are you requesting to speak with (clinical staff, provider,  specific staff member): SHELLI OR HER NURSE    Do you know the name of the person who called: PATIENT    What was the call regarding: PATIENT SAYS THE THYROID MEDICATION SHE WAS TAKING 137 NOW TAKING 150. SHE NEEDS TO KNOW IF SHE STILL NEEDS TO MAKE CHANGES.    Is it okay if the provider responds through MyChart: YES

## 2024-05-15 ENCOUNTER — OFFICE VISIT (OUTPATIENT)
Dept: FAMILY MEDICINE CLINIC | Facility: CLINIC | Age: 64
End: 2024-05-15
Payer: COMMERCIAL

## 2024-05-15 VITALS
RESPIRATION RATE: 16 BRPM | SYSTOLIC BLOOD PRESSURE: 134 MMHG | WEIGHT: 224.4 LBS | BODY MASS INDEX: 36.07 KG/M2 | DIASTOLIC BLOOD PRESSURE: 80 MMHG | TEMPERATURE: 97 F | HEIGHT: 66 IN | OXYGEN SATURATION: 96 % | HEART RATE: 90 BPM

## 2024-05-15 DIAGNOSIS — E66.09 CLASS 2 OBESITY DUE TO EXCESS CALORIES WITHOUT SERIOUS COMORBIDITY WITH BODY MASS INDEX (BMI) OF 35.0 TO 35.9 IN ADULT: Primary | ICD-10-CM

## 2024-05-15 PROCEDURE — 99213 OFFICE O/P EST LOW 20 MIN: CPT | Performed by: NURSE PRACTITIONER

## 2024-05-15 RX ORDER — PHENTERMINE AND TOPIRAMATE 15; 92 MG/1; MG/1
1 CAPSULE, EXTENDED RELEASE ORAL DAILY
Qty: 30 CAPSULE | Refills: 0 | Status: SHIPPED | OUTPATIENT
Start: 2024-05-15

## 2024-05-15 NOTE — PROGRESS NOTES
Chief Complaint  Obesity    Subjective        Festus French presents to [facility]  History of Present Illness  Has lost 4 pounds and is not having chest pain or shortness of breath.  Changing diet and portion control.  Obesity  Pertinent negatives include no chest pain, coughing, fever, numbness or weakness.       The following portions of the patient's history were personally reviewed and updated as appropriate: allergies, current medications, past medical history, past surgical history, past family history, and past social history.     Body mass index is 36.24 kg/m².           Past History:    Medical History: has a past medical history of Allergic (Asa, Pcn/ childhood), Anxiety (2015), Arthritis (2020), Benign heart murmur, Colon polyp (2021), Diverticulosis (2021), HL (hearing loss) (1983), Hypertension, Hypothyroidism, Keloid, Leukopenia, Obesity (1990), Osteopenia, Sickle cell anemia (1961), and Sickle cell anemia without crisis.     Surgical History: has a past surgical history that includes Breast Reduction (Bilateral, 2013); Hysterectomy (1997); Colonoscopy (01/2021); and Breast surgery (2015).     Family History: family history includes Alcohol abuse in her father; Aneurysm in her sister; Arthritis in her mother; Breast cancer (age of onset: 65) in her mother; COPD in her mother; Cirrhosis in her father; Diabetes in her brother, mother, and sister; Fibromyalgia in her sister; Heart disease in her brother and mother; Hypertension in her brother, mother, and sister; Sarcoidosis in her sister; Stroke in her sister.     Social History: reports that she quit smoking about 8 years ago. Her smoking use included cigarettes. She started smoking about 41 years ago. She has a 6.6 pack-year smoking history. She has been exposed to tobacco smoke. She has never used smokeless tobacco. She reports current alcohol use. She reports that she does not use drugs.    Allergies: Morphine and  Penicillins          Current Outpatient Medications:     amLODIPine (NORVASC) 10 MG tablet, TAKE 1 TABLET BY MOUTH DAILY, Disp: 90 tablet, Rfl: 1    B Complex Vitamins (vitamin b complex) capsule capsule, Take  by mouth Daily., Disp: , Rfl:     buPROPion XL (WELLBUTRIN XL) 300 MG 24 hr tablet, TAKE 1 TABLET BY MOUTH DAILY, Disp: 90 tablet, Rfl: 1    CALCIUM PO, Take 600 mg by mouth Daily., Disp: , Rfl:     cloNIDine (CATAPRES) 0.3 MG tablet, TAKE 1 TABLET BY MOUTH DAILY, Disp: 90 tablet, Rfl: 1    fosinopril (MONOPRIL) 40 MG tablet, Take 1 tablet by mouth Daily., Disp: 90 tablet, Rfl: 1    hydroCHLOROthiazide 50 MG tablet, TAKE 1 TABLET BY MOUTH DAILY., Disp: 90 tablet, Rfl: 1    levothyroxine (Synthroid) 150 MCG tablet, Take 1 tablet by mouth Every Other Day., Disp: 45 tablet, Rfl: 1    levothyroxine (SYNTHROID, LEVOTHROID) 137 MCG tablet, Take 1 tablet by mouth Every Other Day., Disp: 45 tablet, Rfl: 1    potassium chloride 10 MEQ CR tablet, TAKE 1 TABLET BY MOUTH DAILY, Disp: 90 tablet, Rfl: 1    azelastine (ASTELIN) 0.1 % nasal spray, 2 sprays into the nostril(s) as directed by provider 2 (Two) Times a Day. Use in each nostril as directed (Patient not taking: Reported on 3/14/2024), Disp: 30 mL, Rfl: 12    busPIRone (BUSPAR) 10 MG tablet, TAKE 1 TABLET BY MOUTH THREE TIMES DAILY (Patient not taking: Reported on 4/10/2024), Disp: 180 tablet, Rfl: 1    Insulin Pen Needle (Pen Needles) 32G X 4 MM misc, 1 each 1 (One) Time Per Week. (Patient not taking: Reported on 3/14/2024), Disp: 30 each, Rfl: 1    Phentermine-Topiramate (Qsymia) 15-92 MG capsule sustained-release 24 hr, Take 1 each by mouth Daily., Disp: 30 capsule, Rfl: 0    Medications Discontinued During This Encounter   Medication Reason    Phentermine-Topiramate 11.25-69 MG capsule sustained-release 24 hr *Therapy completed         Review of Systems   Constitutional:  Negative for fever.   Respiratory:  Negative for cough and shortness of breath.   "  Cardiovascular:  Negative for chest pain, palpitations and leg swelling.   Neurological:  Negative for dizziness, weakness, numbness and headache.        Objective         Vitals:    05/15/24 0807   BP: 134/80   BP Location: Right arm   Patient Position: Sitting   Cuff Size: Large Adult   Pulse: 90   Resp: 16   Temp: 97 °F (36.1 °C)   TempSrc: Temporal   SpO2: 96%   Weight: 102 kg (224 lb 6.4 oz)   Height: 167.6 cm (65.98\")     Body mass index is 36.24 kg/m².         Physical Exam  Vitals reviewed.   Constitutional:       Appearance: Normal appearance. She is well-developed.   HENT:      Head: Normocephalic and atraumatic.      Mouth/Throat:      Pharynx: No oropharyngeal exudate.   Eyes:      Conjunctiva/sclera: Conjunctivae normal.      Pupils: Pupils are equal, round, and reactive to light.   Cardiovascular:      Rate and Rhythm: Normal rate and regular rhythm.      Heart sounds: Normal heart sounds. No murmur heard.     No friction rub. No gallop.   Pulmonary:      Effort: Pulmonary effort is normal.      Breath sounds: Normal breath sounds. No wheezing or rhonchi.   Skin:     General: Skin is warm and dry.   Neurological:      Mental Status: She is alert and oriented to person, place, and time.   Psychiatric:         Mood and Affect: Mood and affect normal.         Behavior: Behavior normal.         Thought Content: Thought content normal.         Judgment: Judgment normal.             Result Review :               Assessment and Plan     Diagnoses and all orders for this visit:    1. Class 2 obesity due to excess calories without serious comorbidity with body mass index (BMI) of 35.0 to 35.9 in adult (Primary)  -     Phentermine-Topiramate (Qsymia) 15-92 MG capsule sustained-release 24 hr; Take 1 each by mouth Daily.  Dispense: 30 capsule; Refill: 0              Follow Up     Return in about 1 month (around 6/15/2024).    Patient was given instructions and counseling regarding her condition or for health " maintenance advice. Please see specific information pulled into the AVS if appropriate.         Answers submitted by the patient for this visit:  Other (Submitted on 5/15/2024)  Please describe your symptoms.: F/u weight loss  Have you had these symptoms before?: Yes  How long have you been having these symptoms?: Greater than 2 weeks  Please list any medications you are currently taking for this condition.: On file  Please describe any probable cause for these symptoms. : Weight  Primary Reason for Visit (Submitted on 5/15/2024)  What is the primary reason for your visit?: Other

## 2024-06-10 DIAGNOSIS — I10 ESSENTIAL HYPERTENSION: ICD-10-CM

## 2024-06-10 RX ORDER — POTASSIUM CHLORIDE 750 MG/1
10 TABLET, FILM COATED, EXTENDED RELEASE ORAL DAILY
Qty: 90 TABLET | Refills: 1 | Status: SHIPPED | OUTPATIENT
Start: 2024-06-10

## 2024-06-10 RX ORDER — AMLODIPINE BESYLATE 10 MG/1
10 TABLET ORAL DAILY
Qty: 90 TABLET | Refills: 1 | Status: SHIPPED | OUTPATIENT
Start: 2024-06-10

## 2024-06-10 RX ORDER — CLONIDINE HYDROCHLORIDE 0.3 MG/1
0.3 TABLET ORAL DAILY
Qty: 90 TABLET | Refills: 1 | Status: SHIPPED | OUTPATIENT
Start: 2024-06-10

## 2024-06-20 ENCOUNTER — OFFICE VISIT (OUTPATIENT)
Dept: FAMILY MEDICINE CLINIC | Facility: CLINIC | Age: 64
End: 2024-06-20
Payer: COMMERCIAL

## 2024-06-20 VITALS
TEMPERATURE: 97.4 F | WEIGHT: 221.4 LBS | HEIGHT: 66 IN | RESPIRATION RATE: 18 BRPM | HEART RATE: 79 BPM | OXYGEN SATURATION: 96 % | DIASTOLIC BLOOD PRESSURE: 80 MMHG | BODY MASS INDEX: 35.58 KG/M2 | SYSTOLIC BLOOD PRESSURE: 122 MMHG

## 2024-06-20 DIAGNOSIS — E66.09 CLASS 2 OBESITY DUE TO EXCESS CALORIES WITHOUT SERIOUS COMORBIDITY WITH BODY MASS INDEX (BMI) OF 35.0 TO 35.9 IN ADULT: Primary | ICD-10-CM

## 2024-06-20 PROCEDURE — 99213 OFFICE O/P EST LOW 20 MIN: CPT | Performed by: NURSE PRACTITIONER

## 2024-06-20 NOTE — PROGRESS NOTES
Chief Complaint  Obesity    Subjective        Festus French presents to [facility]  History of Present Illness  Obesity:  has forgotten to take blood pressure medication today.  Has lost 3 pounds  denies chest pain or shortness of breath.  Recheck shows 122/80.  Obesity  Pertinent negatives include no chest pain, coughing, fever, numbness or weakness.       The following portions of the patient's history were personally reviewed and updated as appropriate: allergies, current medications, past medical history, past surgical history, past family history, and past social history.     Body mass index is 35.75 kg/m².           Past History:    Medical History: has a past medical history of Allergic (Asa, Pcn/ childhood), Anxiety (2015), Arthritis (2020), Benign heart murmur, Colon polyp (2021), Diverticulosis (2021), HL (hearing loss) (1983), Hypertension, Hypothyroidism, Keloid, Leukopenia, Obesity (1990), Osteopenia, Sickle cell anemia (1961), and Sickle cell anemia without crisis.     Surgical History: has a past surgical history that includes Breast Reduction (Bilateral, 2013); Hysterectomy (1997); Colonoscopy (01/2021); and Breast surgery (2015).     Family History: family history includes Alcohol abuse in her father; Aneurysm in her sister; Arthritis in her mother; Breast cancer (age of onset: 65) in her mother; COPD in her mother; Cirrhosis in her father; Diabetes in her brother, mother, and sister; Fibromyalgia in her sister; Heart disease in her brother and mother; Hypertension in her brother, mother, and sister; Sarcoidosis in her sister; Stroke in her sister.     Social History: reports that she quit smoking about 8 years ago. Her smoking use included cigarettes. She started smoking about 41 years ago. She has a 6.6 pack-year smoking history. She has been exposed to tobacco smoke. She has never used smokeless tobacco. She reports current alcohol use. She reports that she does  not use drugs.    Allergies: Morphine and Penicillins          Current Outpatient Medications:     amLODIPine (NORVASC) 10 MG tablet, TAKE 1 TABLET BY MOUTH DAILY, Disp: 90 tablet, Rfl: 1    B Complex Vitamins (vitamin b complex) capsule capsule, Take  by mouth Daily., Disp: , Rfl:     buPROPion XL (WELLBUTRIN XL) 300 MG 24 hr tablet, TAKE 1 TABLET BY MOUTH DAILY, Disp: 90 tablet, Rfl: 1    CALCIUM PO, Take 600 mg by mouth Daily., Disp: , Rfl:     cloNIDine (CATAPRES) 0.3 MG tablet, TAKE 1 TABLET BY MOUTH DAILY, Disp: 90 tablet, Rfl: 1    fosinopril (MONOPRIL) 40 MG tablet, Take 1 tablet by mouth Daily., Disp: 90 tablet, Rfl: 1    hydroCHLOROthiazide 50 MG tablet, TAKE 1 TABLET BY MOUTH DAILY., Disp: 90 tablet, Rfl: 1    levothyroxine (Synthroid) 150 MCG tablet, Take 1 tablet by mouth Every Other Day., Disp: 45 tablet, Rfl: 1    levothyroxine (SYNTHROID, LEVOTHROID) 137 MCG tablet, Take 1 tablet by mouth Every Other Day., Disp: 45 tablet, Rfl: 1    Phentermine-Topiramate (Qsymia) 15-92 MG capsule sustained-release 24 hr, Take 1 each by mouth Daily., Disp: 30 capsule, Rfl: 0    potassium chloride 10 MEQ CR tablet, TAKE 1 TABLET BY MOUTH DAILY, Disp: 90 tablet, Rfl: 1    azelastine (ASTELIN) 0.1 % nasal spray, 2 sprays into the nostril(s) as directed by provider 2 (Two) Times a Day. Use in each nostril as directed (Patient not taking: Reported on 3/14/2024), Disp: 30 mL, Rfl: 12    busPIRone (BUSPAR) 10 MG tablet, TAKE 1 TABLET BY MOUTH THREE TIMES DAILY (Patient not taking: Reported on 4/10/2024), Disp: 180 tablet, Rfl: 1    Insulin Pen Needle (Pen Needles) 32G X 4 MM misc, 1 each 1 (One) Time Per Week. (Patient not taking: Reported on 3/14/2024), Disp: 30 each, Rfl: 1    Tirzepatide-Weight Management (ZEPBOUND) 2.5 MG/0.5ML solution auto-injector, Inject 0.5 mL under the skin into the appropriate area as directed 1 (One) Time Per Week., Disp: 2 mL, Rfl: 0    Tirzepatide-Weight Management (ZEPBOUND) 5 MG/0.5ML  "solution auto-injector, Inject 0.5 mL under the skin into the appropriate area as directed 1 (One) Time Per Week., Disp: 2 mL, Rfl: 0    There are no discontinued medications.      Review of Systems   Constitutional:  Negative for fever.   Respiratory:  Negative for cough and shortness of breath.    Cardiovascular:  Negative for chest pain, palpitations and leg swelling.   Neurological:  Negative for dizziness, weakness, numbness and headache.        Objective         Vitals:    06/20/24 0754 06/20/24 0824   BP: 152/96 122/80   BP Location: Right arm Right arm   Patient Position: Sitting Sitting   Cuff Size: Large Adult Large Adult   Pulse: 79    Resp: 18    Temp: 97.4 °F (36.3 °C)    TempSrc: Temporal    SpO2: 96%    Weight: 100 kg (221 lb 6.4 oz)    Height: 167.6 cm (65.98\")      Body mass index is 35.75 kg/m².         Physical Exam  Vitals reviewed.   Constitutional:       Appearance: Normal appearance. She is well-developed.   HENT:      Head: Normocephalic and atraumatic.      Mouth/Throat:      Pharynx: No oropharyngeal exudate.   Eyes:      Conjunctiva/sclera: Conjunctivae normal.      Pupils: Pupils are equal, round, and reactive to light.   Cardiovascular:      Rate and Rhythm: Normal rate and regular rhythm.      Heart sounds: Normal heart sounds. No murmur heard.     No friction rub. No gallop.   Pulmonary:      Effort: Pulmonary effort is normal.      Breath sounds: Normal breath sounds. No wheezing or rhonchi.   Skin:     General: Skin is warm and dry.   Neurological:      Mental Status: She is alert and oriented to person, place, and time.   Psychiatric:         Mood and Affect: Mood and affect normal.         Behavior: Behavior normal.         Thought Content: Thought content normal.         Judgment: Judgment normal.             Result Review :               Assessment and Plan     Diagnoses and all orders for this visit:    1. Class 2 obesity due to excess calories without serious comorbidity with " body mass index (BMI) of 35.0 to 35.9 in adult (Primary)  -     Tirzepatide-Weight Management (ZEPBOUND) 2.5 MG/0.5ML solution auto-injector; Inject 0.5 mL under the skin into the appropriate area as directed 1 (One) Time Per Week.  Dispense: 2 mL; Refill: 0  -     Tirzepatide-Weight Management (ZEPBOUND) 5 MG/0.5ML solution auto-injector; Inject 0.5 mL under the skin into the appropriate area as directed 1 (One) Time Per Week.  Dispense: 2 mL; Refill: 0              Follow Up     Return in about 3 months (around 9/20/2024).    Patient was given instructions and counseling regarding her condition or for health maintenance advice. Please see specific information pulled into the AVS if appropriate.

## 2024-07-02 ENCOUNTER — TELEPHONE (OUTPATIENT)
Dept: FAMILY MEDICINE CLINIC | Facility: CLINIC | Age: 64
End: 2024-07-02
Payer: COMMERCIAL

## 2024-07-02 DIAGNOSIS — E66.09 CLASS 2 OBESITY DUE TO EXCESS CALORIES WITHOUT SERIOUS COMORBIDITY WITH BODY MASS INDEX (BMI) OF 35.0 TO 35.9 IN ADULT: Primary | ICD-10-CM

## 2024-07-02 NOTE — TELEPHONE ENCOUNTER
Caller: Festus French    Relationship to patient: Self    Best call back number: [phone]     Patient is needing: PATIENT CALLING ABOUT PRIOR AUTHORIZATION FOR ZEPBOUND. PATIENT WAS ADVISED BY PHARMACY THAT SHE NEEDS A PRIOR AUTHORIZATION.

## 2024-07-02 NOTE — TELEPHONE ENCOUNTER
Spoke to  [facility] pharmacy and mary alice,mounjaro and zepbound are not under there formulary to use for weight loss.  Is there something else you would like to try     Ref# [id number]

## 2024-07-02 NOTE — TELEPHONE ENCOUNTER
[facility] has to be done over the phone and called [facility] and has 1 hour 30 min hold time and will be calling office back to start pa [phone]

## 2024-07-03 RX ORDER — SEMAGLUTIDE 0.25 MG/.5ML
0.25 INJECTION, SOLUTION SUBCUTANEOUS WEEKLY
Qty: 2 ML | Refills: 0 | Status: SHIPPED | OUTPATIENT
Start: 2024-07-03

## 2024-07-03 NOTE — TELEPHONE ENCOUNTER
She has been on Wegovy and continue on that but had not seen the best results.  Let patient know and we can continue Wegovy if she would like.

## 2024-08-01 DIAGNOSIS — E66.09 CLASS 2 OBESITY DUE TO EXCESS CALORIES WITHOUT SERIOUS COMORBIDITY WITH BODY MASS INDEX (BMI) OF 35.0 TO 35.9 IN ADULT: ICD-10-CM

## 2024-08-01 RX ORDER — SEMAGLUTIDE 0.5 MG/.5ML
0.5 INJECTION, SOLUTION SUBCUTANEOUS WEEKLY
Qty: 2 ML | Refills: 0 | Status: SHIPPED | OUTPATIENT
Start: 2024-08-01

## 2024-08-01 RX ORDER — SEMAGLUTIDE 0.25 MG/.5ML
0.25 INJECTION, SOLUTION SUBCUTANEOUS WEEKLY
Qty: 2 ML | Refills: 0 | Status: CANCELLED | OUTPATIENT
Start: 2024-08-01

## 2024-08-01 NOTE — TELEPHONE ENCOUNTER
Caller: FrenchFestus    Relationship: Self    Best call back number: [phone]     Requested Prescriptions:   Requested Prescriptions     Pending Prescriptions Disp Refills    Semaglutide-Weight Management (Wegovy) 0.25 MG/0.5ML solution auto-injector 2 mL 0     Sig: Inject 0.5 mL under the skin into the appropriate area as directed 1 (One) Time Per Week.        Pharmacy where request should be sent: [id number] - [place], [place] - [address] [facility] AT [facility] OF [address] & [place] - [phone] [facility] [phone] FX     Last office visit with prescribing clinician: 6/20/2024   Last telemedicine visit with prescribing clinician: Visit date not found   Next office visit with prescribing clinician: 9/20/2024     DUE FOR INCREASE IN DOSAGE    Does the patient have less than a 3 day supply:  [x] Yes  [] No    Would you like a call back once the refill request has been completed: [] Yes [x] No    If the office needs to give you a call back, can they leave a voicemail: [] Yes [x] No    Gracie Ge, PCT   08/01/24 09:42 EDT

## 2024-08-30 ENCOUNTER — TELEPHONE (OUTPATIENT)
Dept: FAMILY MEDICINE CLINIC | Facility: CLINIC | Age: 64
End: 2024-08-30
Payer: COMMERCIAL

## 2024-08-30 DIAGNOSIS — E66.09 CLASS 2 OBESITY DUE TO EXCESS CALORIES WITHOUT SERIOUS COMORBIDITY WITH BODY MASS INDEX (BMI) OF 35.0 TO 35.9 IN ADULT: ICD-10-CM

## 2024-08-30 RX ORDER — SEMAGLUTIDE 1 MG/.5ML
1 INJECTION, SOLUTION SUBCUTANEOUS WEEKLY
Qty: 2 ML | Refills: 0 | Status: SHIPPED | OUTPATIENT
Start: 2024-08-30

## 2024-08-30 RX ORDER — SEMAGLUTIDE 0.5 MG/.5ML
0.5 INJECTION, SOLUTION SUBCUTANEOUS WEEKLY
Qty: 2 ML | Refills: 0 | Status: CANCELLED | OUTPATIENT
Start: 2024-08-30

## 2024-08-30 NOTE — TELEPHONE ENCOUNTER
Caller: Festus French    Relationship: Self    Best call back number:     [phone]        What medication are you requesting: NEXT DOSE OF WEGOVY       Have you had these symptoms before:    [] Yes  [] No    Have you been treated for these symptoms before:   [] Yes  [] No    If a prescription is needed, what is your preferred pharmacy and phone number: [facility] #[id number] - [place], [place] - [address] AT [facility] OF [address] & [place] - [phone] [facility] [phone] FX     Additional notes:

## 2024-09-08 DIAGNOSIS — I10 ESSENTIAL HYPERTENSION: ICD-10-CM

## 2024-09-08 DIAGNOSIS — F41.9 ANXIETY: ICD-10-CM

## 2024-09-09 RX ORDER — BUPROPION HYDROCHLORIDE 300 MG/1
TABLET ORAL
Qty: 90 TABLET | Refills: 1 | Status: SHIPPED | OUTPATIENT
Start: 2024-09-09

## 2024-09-09 RX ORDER — HYDROCHLOROTHIAZIDE 50 MG/1
50 TABLET ORAL DAILY
Qty: 90 TABLET | Refills: 1 | Status: SHIPPED | OUTPATIENT
Start: 2024-09-09

## 2024-09-13 ENCOUNTER — LAB (OUTPATIENT)
Dept: LAB | Facility: HOSPITAL | Age: 64
End: 2024-09-13
Payer: COMMERCIAL

## 2024-09-13 DIAGNOSIS — E07.9 DISORDER OF THYROID: ICD-10-CM

## 2024-09-13 DIAGNOSIS — I10 PRIMARY HYPERTENSION: ICD-10-CM

## 2024-09-13 LAB
ALBUMIN SERPL-MCNC: 4.1 G/DL (ref 3.5–5.2)
ALBUMIN/GLOB SERPL: 1.5 G/DL
ALP SERPL-CCNC: 66 U/L (ref 39–117)
ALT SERPL W P-5'-P-CCNC: 18 U/L (ref 1–33)
ANION GAP SERPL CALCULATED.3IONS-SCNC: 9.7 MMOL/L (ref 5–15)
AST SERPL-CCNC: 17 U/L (ref 1–32)
BACTERIA UR QL AUTO: ABNORMAL /HPF
BILIRUB SERPL-MCNC: 0.3 MG/DL (ref 0–1.2)
BILIRUB UR QL STRIP: NEGATIVE
BUN SERPL-MCNC: 10 MG/DL (ref 8–23)
BUN/CREAT SERPL: 10.1 (ref 7–25)
CALCIUM SPEC-SCNC: 9.8 MG/DL (ref 8.6–10.5)
CHLORIDE SERPL-SCNC: 104 MMOL/L (ref 98–107)
CHOLEST SERPL-MCNC: 185 MG/DL (ref 0–200)
CLARITY UR: ABNORMAL
CO2 SERPL-SCNC: 29.3 MMOL/L (ref 22–29)
COLOR UR: ABNORMAL
CREAT SERPL-MCNC: 0.99 MG/DL (ref 0.57–1)
DEPRECATED RDW RBC AUTO: 38.8 FL (ref 37–54)
EGFRCR SERPLBLD CKD-EPI 2021: 64.2 ML/MIN/1.73
ERYTHROCYTE [DISTWIDTH] IN BLOOD BY AUTOMATED COUNT: 12.6 % (ref 12.3–15.4)
GLOBULIN UR ELPH-MCNC: 2.7 GM/DL
GLUCOSE SERPL-MCNC: 84 MG/DL (ref 65–99)
GLUCOSE UR STRIP-MCNC: NEGATIVE MG/DL
HCT VFR BLD AUTO: 43.4 % (ref 34–46.6)
HDLC SERPL QL: 4.4
HDLC SERPL-MCNC: 42 MG/DL (ref 40–60)
HGB BLD-MCNC: 14.4 G/DL (ref 12–15.9)
HGB UR QL STRIP.AUTO: NEGATIVE
HOLD SPECIMEN: NORMAL
HYALINE CASTS UR QL AUTO: ABNORMAL /LPF
KETONES UR QL STRIP: NEGATIVE
LDLC SERPL CALC-MCNC: 130 MG/DL (ref 0–100)
LEUKOCYTE ESTERASE UR QL STRIP.AUTO: ABNORMAL
MCH RBC QN AUTO: 28.3 PG (ref 26.6–33)
MCHC RBC AUTO-ENTMCNC: 33.2 G/DL (ref 31.5–35.7)
MCV RBC AUTO: 85.4 FL (ref 79–97)
NITRITE UR QL STRIP: NEGATIVE
PH UR STRIP.AUTO: 5.5 [PH] (ref 5–8)
PLATELET # BLD AUTO: 208 10*3/MM3 (ref 140–450)
PMV BLD AUTO: 13.5 FL (ref 6–12)
POTASSIUM SERPL-SCNC: 3.7 MMOL/L (ref 3.5–5.2)
PROT SERPL-MCNC: 6.8 G/DL (ref 6–8.5)
PROT UR QL STRIP: ABNORMAL
RBC # BLD AUTO: 5.08 10*6/MM3 (ref 3.77–5.28)
RBC # UR STRIP: ABNORMAL /HPF
REF LAB TEST METHOD: ABNORMAL
SODIUM SERPL-SCNC: 143 MMOL/L (ref 136–145)
SP GR UR STRIP: 1.03 (ref 1–1.03)
SQUAMOUS #/AREA URNS HPF: ABNORMAL /HPF
TRIGL SERPL-MCNC: 72 MG/DL (ref 0–150)
TSH SERPL DL<=0.05 MIU/L-ACNC: 1.91 UIU/ML (ref 0.27–4.2)
UROBILINOGEN UR QL STRIP: ABNORMAL
VLDLC SERPL-MCNC: 13 MG/DL (ref 5–40)
WBC # UR STRIP: ABNORMAL /HPF
WBC NRBC COR # BLD AUTO: 4.01 10*3/MM3 (ref 3.4–10.8)

## 2024-09-13 PROCEDURE — 80061 LIPID PANEL: CPT

## 2024-09-13 PROCEDURE — 36415 COLL VENOUS BLD VENIPUNCTURE: CPT

## 2024-09-13 PROCEDURE — 81001 URINALYSIS AUTO W/SCOPE: CPT

## 2024-09-13 PROCEDURE — 87086 URINE CULTURE/COLONY COUNT: CPT

## 2024-09-13 PROCEDURE — 80050 GENERAL HEALTH PANEL: CPT

## 2024-09-14 LAB — BACTERIA SPEC AEROBE CULT: NORMAL

## 2024-09-20 ENCOUNTER — OFFICE VISIT (OUTPATIENT)
Dept: FAMILY MEDICINE CLINIC | Facility: CLINIC | Age: 64
End: 2024-09-20
Payer: COMMERCIAL

## 2024-09-20 VITALS
TEMPERATURE: 97 F | SYSTOLIC BLOOD PRESSURE: 144 MMHG | RESPIRATION RATE: 18 BRPM | DIASTOLIC BLOOD PRESSURE: 90 MMHG | HEIGHT: 66 IN | HEART RATE: 86 BPM | BODY MASS INDEX: 36.1 KG/M2 | WEIGHT: 224.6 LBS | OXYGEN SATURATION: 97 %

## 2024-09-20 DIAGNOSIS — E66.09 CLASS 2 OBESITY DUE TO EXCESS CALORIES WITHOUT SERIOUS COMORBIDITY WITH BODY MASS INDEX (BMI) OF 35.0 TO 35.9 IN ADULT: ICD-10-CM

## 2024-09-20 DIAGNOSIS — I10 ESSENTIAL HYPERTENSION: ICD-10-CM

## 2024-09-20 DIAGNOSIS — F41.9 ANXIETY: ICD-10-CM

## 2024-09-20 DIAGNOSIS — Z78.0 POST-MENOPAUSE: ICD-10-CM

## 2024-09-20 DIAGNOSIS — E07.9 DISORDER OF THYROID: ICD-10-CM

## 2024-09-20 DIAGNOSIS — Z12.31 ENCOUNTER FOR SCREENING MAMMOGRAM FOR MALIGNANT NEOPLASM OF BREAST: Primary | ICD-10-CM

## 2024-09-20 PROCEDURE — 99214 OFFICE O/P EST MOD 30 MIN: CPT | Performed by: NURSE PRACTITIONER

## 2024-09-20 RX ORDER — CLONIDINE HYDROCHLORIDE 0.3 MG/1
0.3 TABLET ORAL DAILY
Qty: 90 TABLET | Refills: 1 | Status: SHIPPED | OUTPATIENT
Start: 2024-09-20

## 2024-09-20 RX ORDER — LEVOTHYROXINE SODIUM 150 UG/1
150 TABLET ORAL EVERY OTHER DAY
Qty: 45 TABLET | Refills: 1 | Status: SHIPPED | OUTPATIENT
Start: 2024-09-20

## 2024-09-20 RX ORDER — BUPROPION HYDROCHLORIDE 300 MG/1
300 TABLET ORAL DAILY
Qty: 90 TABLET | Refills: 1 | Status: SHIPPED | OUTPATIENT
Start: 2024-09-20

## 2024-09-20 RX ORDER — FOSINOPRIL SODIUM 40 MG/1
40 TABLET ORAL DAILY
Qty: 90 TABLET | Refills: 1 | Status: SHIPPED | OUTPATIENT
Start: 2024-09-20

## 2024-09-20 RX ORDER — AMLODIPINE BESYLATE 10 MG/1
10 TABLET ORAL DAILY
Qty: 90 TABLET | Refills: 1 | Status: SHIPPED | OUTPATIENT
Start: 2024-09-20

## 2024-09-20 RX ORDER — UBIDECARENONE 50 MG
CAPSULE ORAL
COMMUNITY

## 2024-09-20 RX ORDER — HYDROCHLOROTHIAZIDE 50 MG/1
50 TABLET ORAL DAILY
Qty: 90 TABLET | Refills: 1 | Status: SHIPPED | OUTPATIENT
Start: 2024-09-20

## 2024-09-20 RX ORDER — LEVOTHYROXINE SODIUM 137 UG/1
137 TABLET ORAL EVERY OTHER DAY
Qty: 45 TABLET | Refills: 1 | Status: SHIPPED | OUTPATIENT
Start: 2024-09-20

## 2024-10-04 ENCOUNTER — PATIENT MESSAGE (OUTPATIENT)
Dept: FAMILY MEDICINE CLINIC | Facility: CLINIC | Age: 64
End: 2024-10-04
Payer: COMMERCIAL

## 2024-10-04 ENCOUNTER — HOSPITAL ENCOUNTER (OUTPATIENT)
Dept: MAMMOGRAPHY | Facility: HOSPITAL | Age: 64
Discharge: HOME | End: 2024-10-04
Admitting: NURSE PRACTITIONER
Payer: COMMERCIAL

## 2024-10-04 DIAGNOSIS — Z12.31 ENCOUNTER FOR SCREENING MAMMOGRAM FOR MALIGNANT NEOPLASM OF BREAST: ICD-10-CM

## 2024-10-04 DIAGNOSIS — E66.09 CLASS 2 OBESITY DUE TO EXCESS CALORIES WITHOUT SERIOUS COMORBIDITY WITH BODY MASS INDEX (BMI) OF 35.0 TO 35.9 IN ADULT: Primary | ICD-10-CM

## 2024-10-04 DIAGNOSIS — E66.812 CLASS 2 OBESITY DUE TO EXCESS CALORIES WITHOUT SERIOUS COMORBIDITY WITH BODY MASS INDEX (BMI) OF 35.0 TO 35.9 IN ADULT: Primary | ICD-10-CM

## 2024-10-04 PROCEDURE — 77063 BREAST TOMOSYNTHESIS BI: CPT

## 2024-10-04 PROCEDURE — 77067 SCR MAMMO BI INCL CAD: CPT

## 2024-10-07 ENCOUNTER — TELEPHONE (OUTPATIENT)
Dept: FAMILY MEDICINE CLINIC | Facility: CLINIC | Age: 64
End: 2024-10-07
Payer: COMMERCIAL

## 2024-10-07 NOTE — TELEPHONE ENCOUNTER
From: Festus French  To: Shin Goodwin  Sent: 10/4/2024 4:52 PM EDT  Subject: Zebound to costly    Good afternoon, I am requesting my rx for wegovy be send to [facility] in [place] and thr zebound be cancelled.  Jorge

## 2024-11-11 NOTE — TELEPHONE ENCOUNTER
Caller: Festus French    Relationship: Self    Best call back number:     [phone]        What medication are you requesting: NEXT DOSAGE UP OF Semaglutide-Weight Management 1.7 MG/0.75ML solution auto-injector     Have you had these symptoms before:    [x] Yes  [] No    Have you been treated for these symptoms before:   [x] Yes  [] No    If a prescription is needed, what is your preferred pharmacy and phone number: [facility] #[id number] [place], [place] - [address] AT [facility] OF [address]/[facility] & [place] - [phone]  - [phone] FX     Additional notes:

## 2024-11-26 ENCOUNTER — HOSPITAL ENCOUNTER (OUTPATIENT)
Dept: BONE DENSITY | Facility: HOSPITAL | Age: 64
Discharge: HOME | End: 2024-11-26
Admitting: NURSE PRACTITIONER
Payer: COMMERCIAL

## 2024-11-26 DIAGNOSIS — Z78.0 POST-MENOPAUSE: ICD-10-CM

## 2024-11-26 PROCEDURE — 77080 DXA BONE DENSITY AXIAL: CPT

## 2024-12-09 NOTE — TELEPHONE ENCOUNTER
UPCOMING APPTS  With Family Medicine (MARGARET King)  03/21/2025 at 7:30 AM  LAST OFFICE VISIT - THIS DEPT  9/20/2024 Shin Goodwin APRN

## 2024-12-12 ENCOUNTER — OFFICE VISIT (OUTPATIENT)
Dept: FAMILY MEDICINE CLINIC | Facility: CLINIC | Age: 64
End: 2024-12-12
Payer: COMMERCIAL

## 2024-12-12 VITALS
RESPIRATION RATE: 18 BRPM | TEMPERATURE: 97.8 F | OXYGEN SATURATION: 99 % | HEART RATE: 97 BPM | WEIGHT: 219.4 LBS | SYSTOLIC BLOOD PRESSURE: 138 MMHG | DIASTOLIC BLOOD PRESSURE: 82 MMHG | BODY MASS INDEX: 35.26 KG/M2 | HEIGHT: 66 IN

## 2024-12-12 DIAGNOSIS — S96.912A ANKLE STRAIN, LEFT, INITIAL ENCOUNTER: ICD-10-CM

## 2024-12-12 DIAGNOSIS — E66.09 CLASS 2 OBESITY DUE TO EXCESS CALORIES WITHOUT SERIOUS COMORBIDITY WITH BODY MASS INDEX (BMI) OF 35.0 TO 35.9 IN ADULT: ICD-10-CM

## 2024-12-12 DIAGNOSIS — I10 PRIMARY HYPERTENSION: Primary | ICD-10-CM

## 2024-12-12 DIAGNOSIS — E66.812 CLASS 2 OBESITY DUE TO EXCESS CALORIES WITHOUT SERIOUS COMORBIDITY WITH BODY MASS INDEX (BMI) OF 35.0 TO 35.9 IN ADULT: ICD-10-CM

## 2024-12-12 PROCEDURE — 99213 OFFICE O/P EST LOW 20 MIN: CPT | Performed by: NURSE PRACTITIONER

## 2024-12-12 RX ORDER — METOPROLOL SUCCINATE 25 MG/1
25 TABLET, EXTENDED RELEASE ORAL DAILY
Qty: 30 TABLET | Refills: 1 | Status: SHIPPED | OUTPATIENT
Start: 2024-12-12

## 2024-12-12 NOTE — PROGRESS NOTES
Chief Complaint  Hypertension (Her machine has been running //99/170-102 earlier that day/Yesterday 167/94 9 am,  167/99 4pm  /156/103 7 am before medication  this morning/156/107  at 10:30 am)    Subjective        Festus French presents to [facility]  History of Present Illness  Hypertension:  still not controlled has had elevated at home and the urgent care.  Has had more stress.  Lately    Obesity:  doing well and has lost 5 weight.    Left leg pain without known injury  Hypertension  Pertinent negatives include no chest pain, palpitations or shortness of breath.       The following portions of the patient's history were personally reviewed and updated as appropriate: allergies, current medications, past medical history, past surgical history, past family history, and past social history.     Body mass index is 35.43 kg/m².           Past History:    Medical History: has a past medical history of Allergic (Asa, Pcn/ childhood), Anxiety (2015), Arthritis (2020), Benign heart murmur, Colon polyp (2021), Diverticulosis (2021), HL (hearing loss) (1983), Hypertension, Hypothyroidism, Keloid, Leukopenia, Obesity (1990), Osteopenia, Sickle cell anemia (1961), and Sickle cell anemia without crisis.     Surgical History: has a past surgical history that includes Breast Reduction (Bilateral, 2013); Hysterectomy (1997); Colonoscopy (01/2021); and Breast surgery (2015).     Family History: family history includes Alcohol abuse in her father; Aneurysm in her sister; Arthritis in her mother; Breast cancer (age of onset: 65) in her mother; COPD in her mother; Cirrhosis in her father; Diabetes in her brother, mother, and sister; Fibromyalgia in her sister; Heart disease in her brother and mother; Hypertension in her brother, mother, and sister; Sarcoidosis in her sister; Stroke in her sister.     Social History: reports that she quit smoking about 8 years ago. Her smoking use included  cigarettes. She started smoking about 41 years ago. She has a 6.6 pack-year smoking history. She has been exposed to tobacco smoke. She has never used smokeless tobacco. She reports current alcohol use. She reports that she does not use drugs.    Allergies: Morphine and Penicillins          Current Outpatient Medications:     amLODIPine (NORVASC) 10 MG tablet, Take 1 tablet by mouth Daily., Disp: 90 tablet, Rfl: 1    B Complex Vitamins (vitamin b complex) capsule capsule, Take  by mouth Daily., Disp: , Rfl:     buPROPion XL (WELLBUTRIN XL) 300 MG 24 hr tablet, Take 1 tablet by mouth Daily., Disp: 90 tablet, Rfl: 1    CALCIUM PO, Take 600 mg by mouth Daily., Disp: , Rfl:     cloNIDine (CATAPRES) 0.3 MG tablet, Take 1 tablet by mouth Daily., Disp: 90 tablet, Rfl: 1    fosinopril (MONOPRIL) 40 MG tablet, Take 1 tablet by mouth Daily., Disp: 90 tablet, Rfl: 1    hydroCHLOROthiazide 50 MG tablet, Take 1 tablet by mouth Daily., Disp: 90 tablet, Rfl: 1    levothyroxine (Synthroid) 150 MCG tablet, Take 1 tablet by mouth Every Other Day., Disp: 45 tablet, Rfl: 1    levothyroxine (SYNTHROID, LEVOTHROID) 137 MCG tablet, Take 1 tablet by mouth Every Other Day., Disp: 45 tablet, Rfl: 1    Potassium 99 MG tablet, Take  by mouth., Disp: , Rfl:     Red Yeast Rice 600 MG tablet, Take  by mouth., Disp: , Rfl:     Semaglutide-Weight Management 2.4 MG/0.75ML solution auto-injector, Inject 2.4 mg under the skin into the appropriate area as directed 1 (One) Time Per Week., Disp: 3 mL, Rfl: 0    metoprolol succinate XL (Toprol XL) 25 MG 24 hr tablet, Take 1 tablet by mouth Daily., Disp: 30 tablet, Rfl: 1    Medications Discontinued During This Encounter   Medication Reason    potassium chloride 10 MEQ CR tablet *Therapy completed         Review of Systems   Constitutional:  Negative for fever.   Respiratory:  Negative for cough and shortness of breath.    Cardiovascular:  Negative for chest pain, palpitations and leg swelling.  "  Neurological:  Negative for dizziness, weakness, numbness and headache.        Objective         Vitals:    12/12/24 1301   BP: 138/82   BP Location: Right arm   Patient Position: Sitting   Cuff Size: Large Adult   Pulse: 97   Resp: 18   Temp: 97.8 °F (36.6 °C)   TempSrc: Temporal   SpO2: 99%   Weight: 99.5 kg (219 lb 6.4 oz)   Height: 167.6 cm (65.98\")     Body mass index is 35.43 kg/m².         Physical Exam  Vitals reviewed.   Constitutional:       Appearance: Normal appearance. She is well-developed.   HENT:      Head: Normocephalic and atraumatic.      Mouth/Throat:      Pharynx: No oropharyngeal exudate.   Eyes:      Conjunctiva/sclera: Conjunctivae normal.      Pupils: Pupils are equal, round, and reactive to light.   Cardiovascular:      Rate and Rhythm: Normal rate and regular rhythm.      Heart sounds: Normal heart sounds. No murmur heard.     No friction rub. No gallop.   Pulmonary:      Effort: Pulmonary effort is normal.      Breath sounds: Normal breath sounds. No wheezing or rhonchi.   Skin:     General: Skin is warm and dry.   Neurological:      Mental Status: She is alert and oriented to person, place, and time.   Psychiatric:         Mood and Affect: Mood and affect normal.         Behavior: Behavior normal.         Thought Content: Thought content normal.         Judgment: Judgment normal.             Result Review :               Assessment and Plan     Diagnoses and all orders for this visit:    1. Primary hypertension (Primary)  -     metoprolol succinate XL (Toprol XL) 25 MG 24 hr tablet; Take 1 tablet by mouth Daily.  Dispense: 30 tablet; Refill: 1    2. Ankle strain, left, initial encounter          Journal blood pressure and pulses and will Mychart.     Follow Up     Return for Next scheduled follow up.    Patient was given instructions and counseling regarding her condition or for health maintenance advice. Please see specific information pulled into the AVS if appropriate.         "

## 2025-01-02 DIAGNOSIS — I10 ESSENTIAL HYPERTENSION: ICD-10-CM

## 2025-01-02 RX ORDER — POTASSIUM CHLORIDE 750 MG/1
10 TABLET, EXTENDED RELEASE ORAL DAILY
Qty: 90 TABLET | Refills: 1 | OUTPATIENT
Start: 2025-01-02

## 2025-02-17 DIAGNOSIS — I10 ESSENTIAL HYPERTENSION: ICD-10-CM

## 2025-02-17 RX ORDER — FOSINOPRIL SODIUM 40 MG/1
40 TABLET ORAL DAILY
Qty: 90 TABLET | Refills: 1 | Status: SHIPPED | OUTPATIENT
Start: 2025-02-17

## 2025-03-08 DIAGNOSIS — I10 ESSENTIAL HYPERTENSION: ICD-10-CM

## 2025-03-10 RX ORDER — CLONIDINE HYDROCHLORIDE 0.3 MG/1
0.3 TABLET ORAL DAILY
Qty: 90 TABLET | Refills: 1 | Status: SHIPPED | OUTPATIENT
Start: 2025-03-10

## 2025-03-10 RX ORDER — AMLODIPINE BESYLATE 10 MG/1
10 TABLET ORAL DAILY
Qty: 90 TABLET | Refills: 1 | Status: SHIPPED | OUTPATIENT
Start: 2025-03-10

## 2025-03-20 ENCOUNTER — LAB (OUTPATIENT)
Dept: LAB | Facility: HOSPITAL | Age: 65
End: 2025-03-20
Payer: COMMERCIAL

## 2025-03-20 DIAGNOSIS — I10 ESSENTIAL HYPERTENSION: ICD-10-CM

## 2025-03-20 DIAGNOSIS — E07.9 DISORDER OF THYROID: ICD-10-CM

## 2025-03-20 LAB
ALBUMIN SERPL-MCNC: 4 G/DL (ref 3.5–5.2)
ALBUMIN/GLOB SERPL: 1.4 G/DL
ALP SERPL-CCNC: 64 U/L (ref 39–117)
ALT SERPL W P-5'-P-CCNC: 24 U/L (ref 1–33)
ANION GAP SERPL CALCULATED.3IONS-SCNC: 10.2 MMOL/L (ref 5–15)
AST SERPL-CCNC: 21 U/L (ref 1–32)
BACTERIA UR QL AUTO: ABNORMAL /HPF
BILIRUB SERPL-MCNC: 0.5 MG/DL (ref 0–1.2)
BILIRUB UR QL STRIP: NEGATIVE
BUN SERPL-MCNC: 9 MG/DL (ref 8–23)
BUN/CREAT SERPL: 9.2 (ref 7–25)
CALCIUM SPEC-SCNC: 9.6 MG/DL (ref 8.6–10.5)
CHLORIDE SERPL-SCNC: 102 MMOL/L (ref 98–107)
CHOLEST SERPL-MCNC: 207 MG/DL (ref 0–200)
CLARITY UR: ABNORMAL
CO2 SERPL-SCNC: 28.8 MMOL/L (ref 22–29)
COLOR UR: YELLOW
CREAT SERPL-MCNC: 0.98 MG/DL (ref 0.57–1)
DEPRECATED RDW RBC AUTO: 40.7 FL (ref 37–54)
EGFRCR SERPLBLD CKD-EPI 2021: 64.6 ML/MIN/1.73
ERYTHROCYTE [DISTWIDTH] IN BLOOD BY AUTOMATED COUNT: 13.1 % (ref 12.3–15.4)
GLOBULIN UR ELPH-MCNC: 2.9 GM/DL
GLUCOSE SERPL-MCNC: 96 MG/DL (ref 65–99)
GLUCOSE UR STRIP-MCNC: NEGATIVE MG/DL
HCT VFR BLD AUTO: 41.6 % (ref 34–46.6)
HDLC SERPL QL: 4.06
HDLC SERPL-MCNC: 51 MG/DL (ref 40–60)
HGB BLD-MCNC: 13.6 G/DL (ref 12–15.9)
HGB UR QL STRIP.AUTO: NEGATIVE
HOLD SPECIMEN: NORMAL
HYALINE CASTS UR QL AUTO: ABNORMAL /LPF
KETONES UR QL STRIP: NEGATIVE
LDLC SERPL CALC-MCNC: 142 MG/DL (ref 0–100)
LEUKOCYTE ESTERASE UR QL STRIP.AUTO: ABNORMAL
MCH RBC QN AUTO: 27.8 PG (ref 26.6–33)
MCHC RBC AUTO-ENTMCNC: 32.7 G/DL (ref 31.5–35.7)
MCV RBC AUTO: 84.9 FL (ref 79–97)
NITRITE UR QL STRIP: NEGATIVE
PH UR STRIP.AUTO: 7 [PH] (ref 5–8)
PLATELET # BLD AUTO: 200 10*3/MM3 (ref 140–450)
PMV BLD AUTO: 13.7 FL (ref 6–12)
POTASSIUM SERPL-SCNC: 3.8 MMOL/L (ref 3.5–5.2)
PROT SERPL-MCNC: 6.9 G/DL (ref 6–8.5)
PROT UR QL STRIP: NEGATIVE
RBC # BLD AUTO: 4.9 10*6/MM3 (ref 3.77–5.28)
RBC # UR STRIP: ABNORMAL /HPF
REF LAB TEST METHOD: ABNORMAL
SODIUM SERPL-SCNC: 141 MMOL/L (ref 136–145)
SP GR UR STRIP: 1.01 (ref 1–1.03)
SQUAMOUS #/AREA URNS HPF: ABNORMAL /HPF
TRIGL SERPL-MCNC: 78 MG/DL (ref 0–150)
TSH SERPL DL<=0.05 MIU/L-ACNC: 19.1 UIU/ML (ref 0.27–4.2)
UROBILINOGEN UR QL STRIP: ABNORMAL
VLDLC SERPL-MCNC: 14 MG/DL (ref 5–40)
WBC # UR STRIP: ABNORMAL /HPF
WBC NRBC COR # BLD AUTO: 3.86 10*3/MM3 (ref 3.4–10.8)

## 2025-03-20 PROCEDURE — 81001 URINALYSIS AUTO W/SCOPE: CPT

## 2025-03-20 PROCEDURE — 36415 COLL VENOUS BLD VENIPUNCTURE: CPT

## 2025-03-20 PROCEDURE — 80061 LIPID PANEL: CPT

## 2025-03-20 PROCEDURE — 80050 GENERAL HEALTH PANEL: CPT

## 2025-03-20 PROCEDURE — 87086 URINE CULTURE/COLONY COUNT: CPT

## 2025-03-21 ENCOUNTER — OFFICE VISIT (OUTPATIENT)
Dept: FAMILY MEDICINE CLINIC | Facility: CLINIC | Age: 65
End: 2025-03-21
Payer: COMMERCIAL

## 2025-03-21 VITALS
SYSTOLIC BLOOD PRESSURE: 134 MMHG | HEIGHT: 66 IN | BODY MASS INDEX: 37.86 KG/M2 | HEART RATE: 68 BPM | DIASTOLIC BLOOD PRESSURE: 72 MMHG | WEIGHT: 235.6 LBS | OXYGEN SATURATION: 99 % | TEMPERATURE: 98 F

## 2025-03-21 DIAGNOSIS — E78.2 MIXED HYPERLIPIDEMIA: ICD-10-CM

## 2025-03-21 DIAGNOSIS — R07.9 CHEST PAIN, UNSPECIFIED TYPE: Primary | ICD-10-CM

## 2025-03-21 DIAGNOSIS — I10 PRIMARY HYPERTENSION: ICD-10-CM

## 2025-03-21 DIAGNOSIS — E66.812 CLASS 2 OBESITY DUE TO EXCESS CALORIES WITHOUT SERIOUS COMORBIDITY WITH BODY MASS INDEX (BMI) OF 38.0 TO 38.9 IN ADULT: ICD-10-CM

## 2025-03-21 DIAGNOSIS — E66.09 CLASS 2 OBESITY DUE TO EXCESS CALORIES WITHOUT SERIOUS COMORBIDITY WITH BODY MASS INDEX (BMI) OF 38.0 TO 38.9 IN ADULT: ICD-10-CM

## 2025-03-21 DIAGNOSIS — E07.9 DISORDER OF THYROID: ICD-10-CM

## 2025-03-21 RX ORDER — LEVOTHYROXINE SODIUM 150 UG/1
150 TABLET ORAL EVERY OTHER DAY
Qty: 90 TABLET | Refills: 1 | Status: SHIPPED | OUTPATIENT
Start: 2025-03-21

## 2025-03-21 RX ORDER — SEMAGLUTIDE 0.25 MG/.5ML
0.25 INJECTION, SOLUTION SUBCUTANEOUS WEEKLY
Qty: 2 ML | Refills: 0 | Status: SHIPPED | OUTPATIENT
Start: 2025-03-21

## 2025-03-21 NOTE — PROGRESS NOTES
Chief Complaint  Hypertension (She states she has been have a slight, sharp pain between breasts, she states it only lasts a few seconds and is random. )    Subjective        Festus French presents to [facility]  History of Present Illness  Hypertension:  134/72  she is having slightly have sharp pain that last a second.  No trigger and is random.  Denies getting short of breath.    Obesity: Had stopped because she cannot afford to continue Wegovy due to the new changes in her insurance.  She has gained weight since being off it for the last month.  He has gained 16 pounds.    Hypothyroid.  She is doing well with her medication.  Hypertension  The current episode started more than 1 year ago. The problem has been improved since onset. The problem is controlled. Associated symptoms include peripheral edema. Pertinent negatives include no anxiety, blurred vision, chest pain, headaches, malaise/fatigue, orthopnea, palpitations or shortness of breath. Agents associated with hypertension include thyroid hormones. Risk factors for coronary artery disease include obesity, post-menopausal state and stress. Compliance problems include diet and exercise.        The following portions of the patient's history were personally reviewed and updated as appropriate: allergies, current medications, past medical history, past surgical history, past family history, and past social history.     Body mass index is 38.05 kg/m².           Past History:    Medical History: has a past medical history of Allergic (Asa, Pcn/ childhood), Anxiety (2015), Arthritis (2020), Benign heart murmur, Cataract (2023), Colon polyp (2021), Diverticulosis (2021), HL (hearing loss) (1983), Hypertension, Hypothyroidism, Keloid, Leukopenia, Obesity (1990), Osteopenia, Sickle cell anemia (1961), and Sickle cell anemia without crisis.     Surgical History: has a past surgical history that includes Breast Reduction (Bilateral,  2013); Hysterectomy (1997); Colonoscopy (01/2021); and Breast surgery (2015).     Family History: family history includes Alcohol abuse in her father; Aneurysm in her sister; Arthritis in her mother; Breast cancer (age of onset: 65) in her mother; COPD in her mother; Cancer in her maternal aunt, maternal aunt, mother, and sister; Cirrhosis in her father; Diabetes in her brother, mother, and sister; Fibromyalgia in her sister; Heart disease in her brother and mother; Hypertension in her brother, mother, and sister; Sarcoidosis in her sister; Stroke in her sister.     Social History: reports that she quit smoking about 8 years ago. Her smoking use included cigarettes. She started smoking about 42 years ago. She has a 6.6 pack-year smoking history. She has been exposed to tobacco smoke. She has never used smokeless tobacco. She reports current alcohol use. She reports that she does not use drugs.    Allergies: Morphine and Penicillins          Current Outpatient Medications:     amLODIPine (NORVASC) 10 MG tablet, TAKE 1 TABLET BY MOUTH DAILY, Disp: 90 tablet, Rfl: 1    B Complex Vitamins (vitamin b complex) capsule capsule, Take  by mouth Daily., Disp: , Rfl:     buPROPion XL (WELLBUTRIN XL) 300 MG 24 hr tablet, Take 1 tablet by mouth Daily., Disp: 90 tablet, Rfl: 1    CALCIUM PO, Take 600 mg by mouth Daily., Disp: , Rfl:     cloNIDine (CATAPRES) 0.3 MG tablet, TAKE 1 TABLET BY MOUTH DAILY, Disp: 90 tablet, Rfl: 1    fosinopril (MONOPRIL) 40 MG tablet, Take 1 tablet by mouth Daily., Disp: 90 tablet, Rfl: 1    hydroCHLOROthiazide 50 MG tablet, Take 1 tablet by mouth Daily., Disp: 90 tablet, Rfl: 1    levothyroxine (Synthroid) 150 MCG tablet, Take 1 tablet by mouth Every Other Day., Disp: 90 tablet, Rfl: 1    metoprolol succinate XL (Toprol XL) 25 MG 24 hr tablet, Take 1 tablet by mouth Daily., Disp: 30 tablet, Rfl: 1    Potassium 99 MG tablet, Take 1 tablet by mouth Daily., Disp: 90 each, Rfl: 3    Semaglutide-Weight  "Management (Wegovy) 0.25 MG/0.5ML solution auto-injector, Inject 0.5 mL under the skin into the appropriate area as directed 1 (One) Time Per Week., Disp: 2 mL, Rfl: 0    Medications Discontinued During This Encounter   Medication Reason    levothyroxine (SYNTHROID, LEVOTHROID) 137 MCG tablet *Therapy completed    levothyroxine (Synthroid) 150 MCG tablet *Therapy completed    Red Yeast Rice 600 MG tablet *Therapy completed    Semaglutide-Weight Management 2.4 MG/0.75ML solution auto-injector *Therapy completed         Review of Systems   Constitutional:  Negative for malaise/fatigue.   Eyes:  Negative for blurred vision.   Respiratory:  Negative for shortness of breath.    Cardiovascular:  Negative for chest pain, palpitations and orthopnea.        Objective         Vitals:    03/21/25 0740   BP: 134/72   BP Location: Right arm   Patient Position: Sitting   Cuff Size: Adult   Pulse: 68   Temp: 98 °F (36.7 °C)   TempSrc: Temporal   SpO2: 99%   Weight: 107 kg (235 lb 9.6 oz)   Height: 167.6 cm (65.98\")     Body mass index is 38.05 kg/m².         Physical Exam  Vitals reviewed.   Constitutional:       Appearance: Normal appearance. She is well-developed.   HENT:      Head: Normocephalic and atraumatic.      Mouth/Throat:      Pharynx: No oropharyngeal exudate.   Eyes:      Conjunctiva/sclera: Conjunctivae normal.      Pupils: Pupils are equal, round, and reactive to light.   Cardiovascular:      Rate and Rhythm: Normal rate and regular rhythm.      Heart sounds: Normal heart sounds. No murmur heard.     No friction rub. No gallop.   Pulmonary:      Effort: Pulmonary effort is normal.      Breath sounds: Normal breath sounds. No wheezing or rhonchi.   Skin:     General: Skin is warm and dry.   Neurological:      Mental Status: She is alert and oriented to person, place, and time.   Psychiatric:         Mood and Affect: Mood and affect normal.         Behavior: Behavior normal.         Thought Content: Thought content " normal.         Judgment: Judgment normal.             Result Review :               Assessment and Plan     Diagnoses and all orders for this visit:    1. Chest pain, unspecified type (Primary)  -     Ambulatory Referral to Cardiology    2. Primary hypertension  -     Ambulatory Referral to Cardiology  -     Lipid Panel With / Chol / HDL Ratio; Future  -     Comprehensive Metabolic Panel; Future  -     CBC (No Diff); Future  -     Urinalysis With Culture If Indicated -; Future    3. Disorder of thyroid  Comments:  continue levoithyroxine at current dose.  Orders:  -     levothyroxine (Synthroid) 150 MCG tablet; Take 1 tablet by mouth Every Other Day.  Dispense: 90 tablet; Refill: 1  -     TSH; Future  -     TSH; Future    4. Mixed hyperlipidemia    5. Class 2 obesity due to excess calories without serious comorbidity with body mass index (BMI) of 38.0 to 38.9 in adult  -     Semaglutide-Weight Management (Wegovy) 0.25 MG/0.5ML solution auto-injector; Inject 0.5 mL under the skin into the appropriate area as directed 1 (One) Time Per Week.  Dispense: 2 mL; Refill: 0              Follow Up     Return in about 6 months (around 9/21/2025) for Next scheduled follow up.    Patient was given instructions and counseling regarding her condition or for health maintenance advice. Please see specific information pulled into the AVS if appropriate.

## 2025-03-22 LAB — BACTERIA SPEC AEROBE CULT: NO GROWTH

## 2025-03-27 ENCOUNTER — PATIENT MESSAGE (OUTPATIENT)
Dept: FAMILY MEDICINE CLINIC | Facility: CLINIC | Age: 65
End: 2025-03-27
Payer: COMMERCIAL

## 2025-04-22 ENCOUNTER — OFFICE VISIT (OUTPATIENT)
Dept: CARDIOLOGY | Facility: CLINIC | Age: 65
End: 2025-04-22
Payer: COMMERCIAL

## 2025-04-22 VITALS
DIASTOLIC BLOOD PRESSURE: 79 MMHG | BODY MASS INDEX: 38.28 KG/M2 | WEIGHT: 238.2 LBS | OXYGEN SATURATION: 97 % | HEIGHT: 66 IN | SYSTOLIC BLOOD PRESSURE: 138 MMHG | HEART RATE: 74 BPM

## 2025-04-22 DIAGNOSIS — R01.1 HEART MURMUR: ICD-10-CM

## 2025-04-22 DIAGNOSIS — E07.9 DISORDER OF THYROID: ICD-10-CM

## 2025-04-22 DIAGNOSIS — E78.2 MIXED HYPERLIPIDEMIA: ICD-10-CM

## 2025-04-22 DIAGNOSIS — R07.89 ATYPICAL CHEST PAIN: Primary | ICD-10-CM

## 2025-04-22 DIAGNOSIS — I10 PRIMARY HYPERTENSION: ICD-10-CM

## 2025-04-22 RX ORDER — ATORVASTATIN CALCIUM 20 MG/1
20 TABLET, FILM COATED ORAL DAILY
Qty: 90 TABLET | Refills: 3 | Status: SHIPPED | OUTPATIENT
Start: 2025-04-22

## 2025-04-22 NOTE — PROGRESS NOTES
[facility]  INTERVENTIONAL CARDIOLOGY NEW PATIENT OFFICE VISIT      Chief Complaint  Chest Pain, Hypertension, and Establish Care    Subjective          History of Present Illness    Festus French is a 65 y.o. female who presents to [facility] for new patient visit.       History of Present Illness  The patient presents for evaluation of chest pain.    She reports experiencing intermittent, sharp, stinging chest pains lasting 1 to 2 seconds, occurring approximately 3 to 4 times per week over the past 2 months. These episodes are accompanied by bilateral arm pain and shoulder discomfort. A similar sensation is experienced during physical activity, though it is not described as tightness. A history of a heart murmur since birth is noted, and a stress test was performed in 2020. There is no history of myocardial infarction, and she is uncertain about having had an echocardiogram. Leg swelling has been experienced in the past, but none is present currently.    She has been on Synthroid for over 20 years following a hysterectomy. Thyroid levels have been inconsistent, necessitating dosage adjustments.     A history of hypertension is present, and she is on medication for this condition. Cholesterol status is uncertain, but she recalls discontinuing red yeast rice due to adverse effects.    A history of arthritis in the knee is reported, although no symptoms are currently experienced. No recent urinary tract infections or treatments for such are noted. She is not currently on Wegovy due to cost considerations.    PAST SURGICAL HISTORY:  Hysterectomy: Date unspecified.    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  Tobacco: Quit smoking about 10 to 12 years ago.        Past History:  Past Medical History:   Diagnosis Date    Allergic Asa, Pcn/ childhood     Anxiety 2015    Arthritis 2020    Benign heart murmur     Cataract 2023    Colon polyp 2021    Diverticulosis 2021    HL (hearing loss) 1983    Hypertension     Hypothyroidism     Keloid     Leukopenia     Obesity 1990    Osteopenia     Sickle cell anemia 1961    Sickle cell anemia without crisis     SICKLE CELL TRAIT ONLY       Medical History:  Past Medical History:   Diagnosis Date    Allergic Asa, Pcn/ childhood    Anxiety 2015    Arthritis 2020    Benign heart murmur     Cataract 2023    Colon polyp 2021    Diverticulosis 2021    HL (hearing loss) 1983    Hypertension     Hypothyroidism     Keloid     Leukopenia     Obesity 1990    Osteopenia     Sickle cell anemia 1961    Sickle cell anemia without crisis     SICKLE CELL TRAIT ONLY       Surgical History:   has a past surgical history that includes Breast Reduction (Bilateral, 2013); Hysterectomy (1997); Colonoscopy (01/2021); and Breast surgery (2015).     Family History:   family history includes Alcohol abuse in her father; Aneurysm in her sister; Arthritis in her mother; Breast cancer (age of onset: 65) in her mother; COPD in her mother; Cancer in her maternal aunt, maternal aunt, mother, and sister; Cirrhosis in her father; Diabetes in her brother, mother, and sister; Fibromyalgia in her sister; Heart disease in her brother and mother; Hypertension in her brother, mother, and sister; Sarcoidosis in her sister; Stroke in her sister.     Social History:   reports that she quit smoking about 9 years ago. Her smoking use included cigarettes. She started smoking about 42 years ago. She has a 6.6 pack-year smoking history. She has been exposed to tobacco smoke. She has never used smokeless tobacco. She reports current alcohol use. She reports that she does not use drugs.    Allergies:   Morphine and Penicillins    Current Outpatient Medications on File Prior to Visit   Medication Sig    amLODIPine (NORVASC) 10 MG tablet TAKE 1 TABLET BY MOUTH DAILY    B Complex  "Vitamins (vitamin b complex) capsule capsule Take  by mouth Daily.    buPROPion XL (WELLBUTRIN XL) 300 MG 24 hr tablet Take 1 tablet by mouth Daily.    CALCIUM PO Take 600 mg by mouth Daily.    cloNIDine (CATAPRES) 0.3 MG tablet TAKE 1 TABLET BY MOUTH DAILY    fosinopril (MONOPRIL) 40 MG tablet Take 1 tablet by mouth Daily.    hydroCHLOROthiazide 50 MG tablet Take 1 tablet by mouth Daily.    levothyroxine (Synthroid) 150 MCG tablet Take 1 tablet by mouth Every Other Day.    metoprolol succinate XL (Toprol XL) 25 MG 24 hr tablet Take 1 tablet by mouth Daily.    Potassium 99 MG tablet Take 1 tablet by mouth Daily.    Semaglutide-Weight Management (Wegovy) 0.25 MG/0.5ML solution auto-injector Inject 0.5 mL under the skin into the appropriate area as directed 1 (One) Time Per Week. (Patient not taking: Reported on 4/22/2025)     No current facility-administered medications on file prior to visit.          Review of Systems   Negative ROS except as mentioned in HPI above.     Objective     /79 (BP Location: Left arm, Patient Position: Sitting, Cuff Size: Large Adult)   Pulse 74   Ht 167.6 cm (65.98\")   Wt 108 kg (238 lb 3.2 oz)   SpO2 97%   BMI 38.47 kg/m²       Physical Exam  General : Alert, awake, no acute distress  Neck : Supple, no carotid bruit, no jugular venous distention  CVS : Regular rate and rhythm, no murmur, rubs or gallops  Lungs: Clear to auscultation bilaterally, no crackles or rhonchi  Abdomen: Soft, nontender, bowel sounds heard in all 4 quadrants  Extremities: Warm, well-perfused, no pedal edema      Result Review :     The following data was reviewed by: Ernesto Navas MD on 04/22/2025:    CMP          9/13/2024    07:02 3/20/2025    06:42   CMP   Glucose 84  96    BUN 10  9    Creatinine 0.99  0.98    EGFR 64.2  64.6    Sodium 143  141    Potassium 3.7  3.8    Chloride 104  102    Calcium 9.8  9.6    Total Protein 6.8  6.9    Albumin 4.1  4.0    Globulin 2.7  2.9    Total Bilirubin 0.3  " 0.5    Alkaline Phosphatase 66  64    AST (SGOT) 17  21    ALT (SGPT) 18  24    Albumin/Globulin Ratio 1.5  1.4    BUN/Creatinine Ratio 10.1  9.2    Anion Gap 9.7  10.2      CBC          9/13/2024    07:02 3/20/2025    06:42   CBC   WBC 4.01  3.86    RBC 5.08  4.90    Hemoglobin 14.4  13.6    Hematocrit 43.4  41.6    MCV 85.4  84.9    MCH 28.3  27.8    MCHC 33.2  32.7    RDW 12.6  13.1    Platelets 208  200      TSH          9/13/2024    07:02 3/20/2025    06:42   TSH   TSH 1.910  19.100      Lipid Panel          9/13/2024    07:02 3/20/2025    06:42   Lipid Panel   Total Cholesterol 185  207    Triglycerides 72  78    HDL Cholesterol 42  51    VLDL Cholesterol 13  14    LDL Cholesterol  130  142           Data reviewed: Cardiology studies        No results found for this or any previous visit.            ECG 12 Lead    Date/Time: 4/22/2025 9:21 AM  Performed by: Ernesto Navas MD    Authorized by: Ernesto Navas MD  Comparison: compared with previous ECG   Similar to previous ECG  Comparison to previous ECG: Compared to EKG from exercise stress test in 2020  Rhythm: sinus rhythm  Rate: normal  Q waves: III    QRS axis: normal    Clinical impression: abnormal EKG  Comments: Sinus rhythm with Q waves in inferior lead, similar to prior.          The 10-year ASCVD risk score (Hal DK, et al., 2019) is: 10.9%    Values used to calculate the score:      Age: 65 years      Sex: Female      Is Non- : Yes      Diabetic: No      Tobacco smoker: No      Systolic Blood Pressure: 138 mmHg      Is BP treated: Yes      HDL Cholesterol: 51 mg/dL      Total Cholesterol: 207 mg/dL         Assessment and Plan      Diagnoses and all orders for this visit:    1. Atypical chest pain (Primary)  -     Adult Transthoracic Echo Complete w/ Color, Spectral and Contrast if necessary per protocol; Future  -     Stress Test With Myocardial Perfusion One Day; Future    2. Mixed hyperlipidemia  -     atorvastatin  (LIPITOR) 20 MG tablet; Take 1 tablet by mouth Daily.  Dispense: 90 tablet; Refill: 3    3. Primary hypertension    4. Disorder of thyroid    5. Heart murmur  -     Adult Transthoracic Echo Complete w/ Color, Spectral and Contrast if necessary per protocol; Future  -     Stress Test With Myocardial Perfusion One Day; Future    Other orders  -     ECG 12 Lead        Assessment & Plan  1. Chest pain: EKG abnormalities with inferior Q waves  - Stress test within the next four weeks  - Echocardiogram to assess for valvular insufficiency  - Maintain regular exercise and a balanced diet  - Contact for earlier appointment if concerning abnormalities are detected    2. Hyperlipidemia.  - Prescription for cholesterol-lowering medication to be taken at night/atorvastatin 20 mg daily  - Inform about potential leg pain in 5 to 10% of patients, typically resolving after four weeks  - Notify office if significant pain occurs for alternative treatments    3. Hypertension.  - Continue current medication regimen  - Monitor blood pressure regularly    4. Hypothyroidism.  - Continue current Synthroid regimen    Follow-up  - Follow-up appointment in 6 months      Patient was educated on cardiac diet, adequate exercise and achieving/maintaining optimal weight.    Festus French  reports that she quit smoking about 9 years ago. Her smoking use included cigarettes. She started smoking about 42 years ago. She has a 6.6 pack-year smoking history. She has been exposed to tobacco smoke. She has never used smokeless tobacco.     Follow Up     Return in about 6 months (around 10/22/2025) for Next scheduled follow up.        I spent 45 minutes caring for this patient on this date of service. This time includes time spent by me in the following activities:preparing for the visit, reviewing tests, obtaining and/or reviewing a separately obtained history, performing a medically appropriate examination and/or evaluation , counseling and educating  the patient/family/caregiver, ordering medications, tests, or procedures, referring and communicating with other health care professionals , documenting information in the medical record, independently interpreting results and communicating that information with the patient/family/caregiver, and care coordination.     The patient was seen and examined. Work by the provider also included review and/or ordering of lab tests, review and/or ordering of radiology tests, review and/or ordering of medicine tests, discussion with other physicians or providers, independent review of data, obtaining old records, review/summation of old records, and/or other review.    I have reviewed the family history, social history, and past medical history for this patient. Previous information and data has been reviewed and updated as needed. I have reviewed and verified the chief complaint, history, and other documentation. The patient was interviewed and examined in the clinic and the chart reviewed. The previous observations, recommendations, and conclusions were reviewed including those of other providers.     The plan was discussed with the patient and/or family. The patient was given time to ask questions and these questions were answered. At the conclusion of their visit they had no additional questions or concerns and all questions were answered to their satisfaction.     Patient was given instructions and counseling regarding her condition or for health maintenance advice. Please see specific information pulled into the AVS if appropriate.      Patient or patient representative verbalized consent for the use of Ambient Listening during the visit with  Ernesto Navas MD for chart documentation. 4/22/2025  09:23 EDT      Ernesto Navas MD, [facility]  04/22/25  09:09 EDT    Dictated Utilizing Dragon Dictation

## 2025-05-16 ENCOUNTER — HOSPITAL ENCOUNTER (OUTPATIENT)
Facility: HOSPITAL | Age: 65
Discharge: HOME | End: 2025-05-16
Payer: COMMERCIAL

## 2025-05-16 ENCOUNTER — HOSPITAL ENCOUNTER (OUTPATIENT)
Facility: HOSPITAL | Age: 65
Discharge: HOME | End: 2025-05-16
Admitting: INTERNAL MEDICINE
Payer: COMMERCIAL

## 2025-05-16 DIAGNOSIS — R07.89 ATYPICAL CHEST PAIN: ICD-10-CM

## 2025-05-16 DIAGNOSIS — R01.1 HEART MURMUR: ICD-10-CM

## 2025-05-16 LAB
AORTIC DIMENSIONLESS INDEX: 0.69 (DI)
ASCENDING AORTA: 3.2 CM
AV MEAN PRESS GRAD SYS DOP V1V2: 6.6 MMHG
AV VMAX SYS DOP: 167 CM/SEC
BH CV ECHO MEAS - 2D AUTO EF SIEMENS: 68.2 %
BH CV ECHO MEAS - AI P1/2T: 1109 MSEC
BH CV ECHO MEAS - AO MAX PG: 11.2 MMHG
BH CV ECHO MEAS - AO ROOT DIAM: 3.1 CM
BH CV ECHO MEAS - AO V2 VTI: 39.1 CM
BH CV ECHO MEAS - AVA(I,D): 2.6 CM2
BH CV ECHO MEAS - EF(MOD-SP2): 71.7 %
BH CV ECHO MEAS - EF(MOD-SP4): 67.4 %
BH CV ECHO MEAS - IVS/LVPW: 1.33 CM
BH CV ECHO MEAS - IVSD: 1.2 CM
BH CV ECHO MEAS - LA DIMENSION: 4 CM
BH CV ECHO MEAS - LAT PEAK E' VEL: 14.3 CM/SEC
BH CV ECHO MEAS - LV MAX PG: 5.8 MMHG
BH CV ECHO MEAS - LV MEAN PG: 2.8 MMHG
BH CV ECHO MEAS - LV V1 MAX: 120 CM/SEC
BH CV ECHO MEAS - LV V1 VTI: 26.8 CM
BH CV ECHO MEAS - LVIDD: 4.2 CM
BH CV ECHO MEAS - LVIDS: 2.8 CM
BH CV ECHO MEAS - LVOT AREA: 3.8 CM2
BH CV ECHO MEAS - LVOT DIAM: 2.2 CM
BH CV ECHO MEAS - LVPWD: 0.9 CM
BH CV ECHO MEAS - MED PEAK E' VEL: 10.9 CM/SEC
BH CV ECHO MEAS - MV A MAX VEL: 60.3 CM/SEC
BH CV ECHO MEAS - MV E MAX VEL: 94.3 CM/SEC
BH CV ECHO MEAS - MV E/A: 1.56
BH CV ECHO MEAS - PA V2 MAX: 130 CM/SEC
BH CV ECHO MEAS - SV(LVOT): 102 ML
BH CV ECHO MEAS - SVI(LVOT): 45.1 ML/M2
BH CV ECHO MEAS - TAPSE (>1.6): 2.7 CM
BH CV ECHO MEAS - TR MAX PG: 23.5 MMHG
BH CV ECHO MEAS - TR MAX VEL: 242.4 CM/SEC
BH CV ECHO MEASUREMENTS AVERAGE E/E' RATIO: 7.48
BH CV IMMEDIATE POST RECOVERY TECH DATA SYMPTOMS: NORMAL
BH CV IMMEDIATE POST TECH DATA BLOOD PRESSURE: NORMAL MMHG
BH CV IMMEDIATE POST TECH DATA HEART RATE: 145 BPM
BH CV IMMEDIATE POST TECH DATA OXYGEN SATS: 96 %
BH CV SIX MINUTE RECOVERY TECH DATA BLOOD PRESSURE: NORMAL
BH CV SIX MINUTE RECOVERY TECH DATA HEART RATE: 87 BPM
BH CV SIX MINUTE RECOVERY TECH DATA OXYGEN SATURATION: 96 %
BH CV SIX MINUTE RECOVERY TECH DATA SYMPTOMS: NORMAL
BH CV STRESS BP STAGE 1: NORMAL
BH CV STRESS BP STAGE 2: NORMAL
BH CV STRESS BP STAGE 3: NORMAL
BH CV STRESS DURATION MIN STAGE 1: 3
BH CV STRESS DURATION MIN STAGE 2: 3
BH CV STRESS DURATION MIN STAGE 3: 3
BH CV STRESS DURATION SEC STAGE 1: 0
BH CV STRESS DURATION SEC STAGE 2: 0
BH CV STRESS DURATION SEC STAGE 3: 0
BH CV STRESS GRADE STAGE 1: 10
BH CV STRESS GRADE STAGE 2: 12
BH CV STRESS GRADE STAGE 3: 10
BH CV STRESS HR STAGE 1: 119
BH CV STRESS HR STAGE 2: 145
BH CV STRESS HR STAGE 3: 158
BH CV STRESS METS STAGE 1: 5
BH CV STRESS METS STAGE 2: 7.5
BH CV STRESS METS STAGE 3: 10
BH CV STRESS O2 STAGE 1: 97
BH CV STRESS O2 STAGE 2: 96
BH CV STRESS O2 STAGE 3: 96
BH CV STRESS PROTOCOL 1: NORMAL
BH CV STRESS RECOVERY BP: NORMAL MMHG
BH CV STRESS RECOVERY HR: 87 BPM
BH CV STRESS RECOVERY O2: 96 %
BH CV STRESS SPEED STAGE 1: 1.7
BH CV STRESS SPEED STAGE 2: 2.5
BH CV STRESS SPEED STAGE 3: 3
BH CV STRESS STAGE 1: 1
BH CV STRESS STAGE 2: 2
BH CV STRESS STAGE 3: 3
BH CV THREE MINUTE POST TECH DATA BLOOD PRESSURE: NORMAL MMHG
BH CV THREE MINUTE POST TECH DATA HEART RATE: 98 BPM
BH CV THREE MINUTE POST TECH DATA OXYGEN SATURATION: 96 %
BH CV THREE MINUTE RECOVERY TECH DATA SYMPTOM: NORMAL
BH CV XLRA - TDI S': 11.7 CM/SEC
IVRT: 61 MS
LEFT ATRIUM VOLUME INDEX: 39.5 ML/M2
LV EF BIPLANE MOD: 68.2 %
MAXIMAL PREDICTED HEART RATE: 156 BPM
PERCENT MAX PREDICTED HR: 101.28 %
STRESS BASELINE BP: NORMAL MMHG
STRESS BASELINE HR: 65 BPM
STRESS O2 SAT REST: 94 %
STRESS PERCENT HR: 119 %
STRESS POST O2 SAT PEAK: 96 %
STRESS POST PEAK BP: NORMAL MMHG
STRESS POST PEAK HR: 158 BPM
STRESS TARGET HR: 133 BPM

## 2025-05-16 PROCEDURE — 34310000005 TECHNETIUM TETROFOSMIN KIT: Performed by: INTERNAL MEDICINE

## 2025-05-16 PROCEDURE — A9502 TC99M TETROFOSMIN: HCPCS | Performed by: INTERNAL MEDICINE

## 2025-05-16 PROCEDURE — 78452 HT MUSCLE IMAGE SPECT MULT: CPT

## 2025-05-16 PROCEDURE — 25010000002 REGADENOSON 0.4 MG/5ML SOLUTION: Performed by: INTERNAL MEDICINE

## 2025-05-16 PROCEDURE — 93017 CV STRESS TEST TRACING ONLY: CPT

## 2025-05-16 PROCEDURE — 93306 TTE W/DOPPLER COMPLETE: CPT

## 2025-05-16 RX ORDER — REGADENOSON 0.08 MG/ML
0.4 INJECTION, SOLUTION INTRAVENOUS
Status: COMPLETED | OUTPATIENT
Start: 2025-05-16 | End: 2025-05-16

## 2025-05-16 RX ADMIN — TETROFOSMIN 1 DOSE: 1.38 INJECTION, POWDER, LYOPHILIZED, FOR SOLUTION INTRAVENOUS at 11:22

## 2025-05-16 RX ADMIN — TETROFOSMIN 1 DOSE: 1.38 INJECTION, POWDER, LYOPHILIZED, FOR SOLUTION INTRAVENOUS at 10:02

## 2025-05-16 RX ADMIN — REGADENOSON 0.4 MG: 0.08 INJECTION, SOLUTION INTRAVENOUS at 11:22

## 2025-05-18 LAB
BH CV IMMEDIATE POST RECOVERY TECH DATA SYMPTOMS: NORMAL
BH CV IMMEDIATE POST TECH DATA BLOOD PRESSURE: NORMAL MMHG
BH CV IMMEDIATE POST TECH DATA HEART RATE: 145 BPM
BH CV IMMEDIATE POST TECH DATA OXYGEN SATS: 96 %
BH CV REST NUCLEAR ISOTOPE DOSE: 9.4 MCI
BH CV SIX MINUTE RECOVERY TECH DATA BLOOD PRESSURE: NORMAL
BH CV SIX MINUTE RECOVERY TECH DATA HEART RATE: 87 BPM
BH CV SIX MINUTE RECOVERY TECH DATA OXYGEN SATURATION: 96 %
BH CV SIX MINUTE RECOVERY TECH DATA SYMPTOMS: NORMAL
BH CV STRESS BP STAGE 1: NORMAL
BH CV STRESS BP STAGE 2: NORMAL
BH CV STRESS BP STAGE 3: NORMAL
BH CV STRESS DURATION MIN STAGE 1: 3
BH CV STRESS DURATION MIN STAGE 2: 3
BH CV STRESS DURATION MIN STAGE 3: 3
BH CV STRESS DURATION SEC STAGE 1: 0
BH CV STRESS DURATION SEC STAGE 2: 0
BH CV STRESS GRADE STAGE 1: 10
BH CV STRESS GRADE STAGE 2: 12
BH CV STRESS GRADE STAGE 3: 10
BH CV STRESS HR STAGE 1: 119
BH CV STRESS HR STAGE 2: 146
BH CV STRESS HR STAGE 3: 158
BH CV STRESS METS STAGE 1: 5
BH CV STRESS METS STAGE 2: 7.5
BH CV STRESS METS STAGE 3: 10
BH CV STRESS NUCLEAR ISOTOPE DOSE: 36.9 MCI
BH CV STRESS O2 STAGE 1: 96
BH CV STRESS O2 STAGE 3: 96
BH CV STRESS PROTOCOL 1: NORMAL
BH CV STRESS RECOVERY BP: NORMAL MMHG
BH CV STRESS RECOVERY HR: 87 BPM
BH CV STRESS RECOVERY O2: 96 %
BH CV STRESS SPEED STAGE 1: 1.7
BH CV STRESS SPEED STAGE 2: 2.5
BH CV STRESS SPEED STAGE 3: 3
BH CV STRESS STAGE 1: 1
BH CV STRESS STAGE 2: 2
BH CV STRESS STAGE 3: 3
BH CV THREE MINUTE POST TECH DATA BLOOD PRESSURE: NORMAL MMHG
BH CV THREE MINUTE POST TECH DATA HEART RATE: 98 BPM
BH CV THREE MINUTE POST TECH DATA OXYGEN SATURATION: 95 %
MAXIMAL PREDICTED HEART RATE: 156 BPM
PERCENT MAX PREDICTED HR: 101.28 %
SPECT HRT GATED+EF W RNC IV: 55 %
STRESS BASELINE BP: NORMAL MMHG
STRESS BASELINE HR: 65 BPM
STRESS O2 SAT REST: 96 %
STRESS PERCENT HR: 119 %
STRESS POST ESTIMATED WORKLOAD: 8.2 METS
STRESS POST EXERCISE DUR MIN: 9 MIN
STRESS POST EXERCISE DUR SEC: 0 SEC
STRESS POST O2 SAT PEAK: 96 %
STRESS POST PEAK BP: NORMAL MMHG
STRESS POST PEAK HR: 158 BPM
STRESS TARGET HR: 133 BPM

## 2025-05-21 ENCOUNTER — RESULTS FOLLOW-UP (OUTPATIENT)
Dept: CARDIOLOGY | Facility: CLINIC | Age: 65
End: 2025-05-21
Payer: COMMERCIAL

## 2025-05-22 ENCOUNTER — PATIENT MESSAGE (OUTPATIENT)
Dept: FAMILY MEDICINE CLINIC | Facility: CLINIC | Age: 65
End: 2025-05-22
Payer: COMMERCIAL

## 2025-05-22 DIAGNOSIS — E66.812 CLASS 2 OBESITY DUE TO EXCESS CALORIES WITHOUT SERIOUS COMORBIDITY WITH BODY MASS INDEX (BMI) OF 38.0 TO 38.9 IN ADULT: Primary | ICD-10-CM

## 2025-05-22 DIAGNOSIS — E66.09 CLASS 2 OBESITY DUE TO EXCESS CALORIES WITHOUT SERIOUS COMORBIDITY WITH BODY MASS INDEX (BMI) OF 38.0 TO 38.9 IN ADULT: Primary | ICD-10-CM

## 2025-06-02 ENCOUNTER — PATIENT MESSAGE (OUTPATIENT)
Dept: FAMILY MEDICINE CLINIC | Facility: CLINIC | Age: 65
End: 2025-06-02
Payer: COMMERCIAL

## 2025-06-19 ENCOUNTER — OFFICE VISIT (OUTPATIENT)
Dept: FAMILY MEDICINE CLINIC | Facility: CLINIC | Age: 65
End: 2025-06-19
Payer: COMMERCIAL

## 2025-06-19 VITALS
WEIGHT: 244 LBS | HEIGHT: 66 IN | OXYGEN SATURATION: 95 % | TEMPERATURE: 97 F | HEART RATE: 97 BPM | DIASTOLIC BLOOD PRESSURE: 78 MMHG | SYSTOLIC BLOOD PRESSURE: 136 MMHG | BODY MASS INDEX: 39.21 KG/M2

## 2025-06-19 DIAGNOSIS — E66.09 CLASS 2 OBESITY DUE TO EXCESS CALORIES WITHOUT SERIOUS COMORBIDITY WITH BODY MASS INDEX (BMI) OF 39.0 TO 39.9 IN ADULT: Primary | ICD-10-CM

## 2025-06-19 DIAGNOSIS — E66.812 CLASS 2 OBESITY DUE TO EXCESS CALORIES WITHOUT SERIOUS COMORBIDITY WITH BODY MASS INDEX (BMI) OF 39.0 TO 39.9 IN ADULT: Primary | ICD-10-CM

## 2025-06-19 RX ORDER — PHENTERMINE HYDROCHLORIDE 37.5 MG/1
37.5 CAPSULE ORAL EVERY MORNING
Qty: 30 CAPSULE | Refills: 0 | Status: SHIPPED | OUTPATIENT
Start: 2025-06-19

## 2025-06-19 NOTE — PROGRESS NOTES
Answers submitted by the patient for this visit:  Weight Management (Submitted on 6/18/2025)  Chief Complaint: Weight Management  Weight: increased  Weight change (lbs): 10  Weight loss treatment: increasing exercise  Eating habit changes: No changes  Treatment barriers: none  Chief Complaint  Hypertension and Obesity (Wants to discuss restarting phentermine as her insurance no longer covers wegovy.)    Subjective        Festus French presents to [facility]  History of Present Illness  Hypertension:  136/78 doing well on medication.    Obesity:  doing well on medication.Understands need to lose 4 pounds and the risk for PPH with taking phentermine.  She also understands to continue on will need to have a controlled blood pressure. Has been on the past and has gained weight since having to stop GLP-s due tier increase in January.  States has been having more swelling but may be from not drinking enough water.    Hypertension  Associated symptoms: no chest pain, no palpitations, no shortness of breath and no dizziness    Obesity  Pertinent negative symptoms include no chest pain, no cough, no fever, no numbness and no weakness.   Weight Management  Weight:  Increased  Weight change (lbs):  10  Weight loss treatment:  Increasing exercise  Treatment barriers:  None      The following portions of the patient's history were personally reviewed and updated as appropriate: allergies, current medications, past medical history, past surgical history, past family history, and past social history.     Body mass index is 39.4 kg/m².           Past History:    Medical History: has a past medical history of Allergic (Asa, Pcn/ childhood), Anxiety (2015), Arthritis (2020), Benign heart murmur, Cataract (2023), Colon polyp (2021), Diverticulosis (2021), HL (hearing loss) (1983), Hypertension, Hypothyroidism, Keloid, Leukopenia, Obesity (1990), Osteopenia, Sickle cell anemia (1961), and Sickle cell  anemia without crisis.     Surgical History: has a past surgical history that includes Breast Reduction (Bilateral, 2013); Hysterectomy (1997); Colonoscopy (01/2021); and Breast surgery (2015).     Family History: family history includes Alcohol abuse in her father; Aneurysm in her sister; Arthritis in her mother; Breast cancer (age of onset: 65) in her mother; COPD in her mother; Cancer in her maternal aunt, maternal aunt, mother, and sister; Cirrhosis in her father; Diabetes in her brother, mother, and sister; Fibromyalgia in her sister; Heart disease in her brother and mother; Hypertension in her brother, mother, and sister; Sarcoidosis in her sister; Stroke in her sister.     Social History: reports that she quit smoking about 9 years ago. Her smoking use included cigarettes. She started smoking about 42 years ago. She has a 6.6 pack-year smoking history. She has been exposed to tobacco smoke. She has never used smokeless tobacco. She reports current alcohol use. She reports that she does not use drugs.    Allergies: Morphine and Penicillins          Current Outpatient Medications:     amLODIPine (NORVASC) 10 MG tablet, TAKE 1 TABLET BY MOUTH DAILY, Disp: 90 tablet, Rfl: 1    atorvastatin (LIPITOR) 20 MG tablet, Take 1 tablet by mouth Daily., Disp: 90 tablet, Rfl: 3    B Complex Vitamins (vitamin b complex) capsule capsule, Take  by mouth Daily., Disp: , Rfl:     buPROPion XL (WELLBUTRIN XL) 300 MG 24 hr tablet, Take 1 tablet by mouth Daily., Disp: 90 tablet, Rfl: 1    CALCIUM PO, Take 600 mg by mouth Daily., Disp: , Rfl:     cloNIDine (CATAPRES) 0.3 MG tablet, TAKE 1 TABLET BY MOUTH DAILY, Disp: 90 tablet, Rfl: 1    fosinopril (MONOPRIL) 40 MG tablet, Take 1 tablet by mouth Daily., Disp: 90 tablet, Rfl: 1    hydroCHLOROthiazide 50 MG tablet, Take 1 tablet by mouth Daily., Disp: 90 tablet, Rfl: 1    levothyroxine (Synthroid) 150 MCG tablet, Take 1 tablet by mouth Every Other Day., Disp: 90 tablet, Rfl: 1     "Potassium 99 MG tablet, Take 1 tablet by mouth Daily., Disp: 90 each, Rfl: 3    metoprolol succinate XL (Toprol XL) 25 MG 24 hr tablet, Take 1 tablet by mouth Daily., Disp: 30 tablet, Rfl: 1    phentermine 37.5 MG capsule, Take 1 capsule by mouth Every Morning., Disp: 30 capsule, Rfl: 0    Semaglutide-Weight Management (Wegovy) 0.25 MG/0.5ML solution auto-injector, Inject 0.5 mL under the skin into the appropriate area as directed 1 (One) Time Per Week., Disp: 2 mL, Rfl: 0    There are no discontinued medications.      Review of Systems   Constitutional:  Negative for fever.   Respiratory:  Negative for cough and shortness of breath.    Cardiovascular:  Negative for chest pain, palpitations and leg swelling.   Neurological:  Negative for dizziness, weakness, numbness and headache.        Objective         Vitals:    06/19/25 0933   BP: 136/78   BP Location: Right arm   Patient Position: Sitting   Cuff Size: Adult   Pulse: 97   Temp: 97 °F (36.1 °C)   TempSrc: Temporal   SpO2: 95%   Weight: 111 kg (244 lb)   Height: 167.6 cm (65.98\")     Body mass index is 39.4 kg/m².         Physical Exam  Vitals reviewed.   Constitutional:       Appearance: Normal appearance. She is well-developed.   HENT:      Head: Normocephalic and atraumatic.      Mouth/Throat:      Pharynx: No oropharyngeal exudate.   Eyes:      Conjunctiva/sclera: Conjunctivae normal.      Pupils: Pupils are equal, round, and reactive to light.   Cardiovascular:      Rate and Rhythm: Normal rate and regular rhythm.      Heart sounds: Normal heart sounds. No murmur heard.     No friction rub. No gallop.   Pulmonary:      Effort: Pulmonary effort is normal.      Breath sounds: Normal breath sounds. No wheezing or rhonchi.   Skin:     General: Skin is warm and dry.   Neurological:      Mental Status: She is alert and oriented to person, place, and time.   Psychiatric:         Mood and Affect: Mood and affect normal.         Behavior: Behavior normal.         " Thought Content: Thought content normal.         Judgment: Judgment normal.             Result Review :               Assessment and Plan     Diagnoses and all orders for this visit:    1. Class 2 obesity due to excess calories without serious comorbidity with body mass index (BMI) of 39.0 to 39.9 in adult (Primary)  -     phentermine 37.5 MG capsule; Take 1 capsule by mouth Every Morning.  Dispense: 30 capsule; Refill: 0              Follow Up     Return in about 1 month (around 7/19/2025).    Patient was given instructions and counseling regarding her condition or for health maintenance advice. Please see specific information pulled into the AVS if appropriate.

## 2025-07-07 DIAGNOSIS — Z12.31 ENCOUNTER FOR SCREENING MAMMOGRAM FOR MALIGNANT NEOPLASM OF BREAST: Primary | ICD-10-CM

## 2025-07-24 ENCOUNTER — OFFICE VISIT (OUTPATIENT)
Dept: FAMILY MEDICINE CLINIC | Facility: CLINIC | Age: 65
End: 2025-07-24
Payer: COMMERCIAL

## 2025-07-24 ENCOUNTER — HOSPITAL ENCOUNTER (OUTPATIENT)
Dept: GENERAL RADIOLOGY | Facility: HOSPITAL | Age: 65
Discharge: HOME | End: 2025-07-24
Admitting: NURSE PRACTITIONER
Payer: COMMERCIAL

## 2025-07-24 VITALS
RESPIRATION RATE: 18 BRPM | DIASTOLIC BLOOD PRESSURE: 74 MMHG | HEIGHT: 66 IN | TEMPERATURE: 97.4 F | SYSTOLIC BLOOD PRESSURE: 118 MMHG | HEART RATE: 95 BPM | WEIGHT: 239.2 LBS | BODY MASS INDEX: 38.44 KG/M2 | OXYGEN SATURATION: 98 %

## 2025-07-24 DIAGNOSIS — Z51.81 MEDICATION MONITORING ENCOUNTER: ICD-10-CM

## 2025-07-24 DIAGNOSIS — E66.812 CLASS 2 OBESITY DUE TO EXCESS CALORIES WITHOUT SERIOUS COMORBIDITY WITH BODY MASS INDEX (BMI) OF 38.0 TO 38.9 IN ADULT: ICD-10-CM

## 2025-07-24 DIAGNOSIS — E66.09 CLASS 2 OBESITY DUE TO EXCESS CALORIES WITHOUT SERIOUS COMORBIDITY WITH BODY MASS INDEX (BMI) OF 38.0 TO 38.9 IN ADULT: ICD-10-CM

## 2025-07-24 DIAGNOSIS — S96.912D STRAIN OF LEFT ANKLE, SUBSEQUENT ENCOUNTER: ICD-10-CM

## 2025-07-24 DIAGNOSIS — M25.572 ACUTE LEFT ANKLE PAIN: ICD-10-CM

## 2025-07-24 DIAGNOSIS — Z00.00 ANNUAL PHYSICAL EXAM: Primary | ICD-10-CM

## 2025-07-24 LAB
AMPHET+METHAMPHET UR QL: POSITIVE
AMPHETAMINE INTERNAL CONTROL: ABNORMAL
AMPHETAMINES UR QL: NEGATIVE
BARBITURATE INTERNAL CONTROL: ABNORMAL
BARBITURATES UR QL SCN: NEGATIVE
BENZODIAZ UR QL SCN: NEGATIVE
BENZODIAZEPINE INTERNAL CONTROL: ABNORMAL
BUPRENORPHINE INTERNAL CONTROL: ABNORMAL
BUPRENORPHINE SERPL-MCNC: NEGATIVE NG/ML
CANNABINOIDS SERPL QL: NEGATIVE
COCAINE INTERNAL CONTROL: ABNORMAL
COCAINE UR QL: NEGATIVE
EXPIRATION DATE: ABNORMAL
Lab: ABNORMAL
MDMA (ECSTASY) INTERNAL CONTROL: ABNORMAL
MDMA UR QL SCN: NEGATIVE
METHADONE INTERNAL CONTROL: ABNORMAL
METHADONE UR QL SCN: NEGATIVE
METHAMPHETAMINE INTERNAL CONTROL: ABNORMAL
MORPHINE INTERNAL CONTROL: ABNORMAL
MORPHINE/OPIATES SCREEN, URINE: NEGATIVE
OXYCODONE INTERNAL CONTROL: ABNORMAL
OXYCODONE UR QL SCN: NEGATIVE
PCP UR QL SCN: NEGATIVE
PHENCYCLIDINE INTERNAL CONTROL: ABNORMAL
THC INTERNAL CONTROL: ABNORMAL

## 2025-07-24 PROCEDURE — 73610 X-RAY EXAM OF ANKLE: CPT

## 2025-07-24 RX ORDER — PHENTERMINE AND TOPIRAMATE EXTENDED-RELEASE 3.75; 23 MG/1; MG/1
1 CAPSULE ORAL DAILY
Qty: 30 CAPSULE | Refills: 0 | Status: CANCELLED | OUTPATIENT
Start: 2025-07-24

## 2025-07-24 RX ORDER — PHENTERMINE AND TOPIRAMATE EXTENDED-RELEASE 3.75; 23 MG/1; MG/1
3.75 CAPSULE ORAL DAILY
Qty: 30 CAPSULE | Refills: 0 | Status: SHIPPED | OUTPATIENT
Start: 2025-07-24

## 2025-07-24 NOTE — PROGRESS NOTES
Chief Complaint  Obesity (Has been unable to exercise due to foot pain), Foot Swelling (And pain - has had pain for a month, went to  - they told her it was tendonitis. Has not had an x-ray), and Annual Exam    Subjective        Festus French presents to [facility]  History of Present Illness  Left tibial tendonitis has not improved in the last month.  Denies known injury and has tried meloxicam and nothing has helped.  Unable to exercise due to injury and trouble walking.    Obesity:  not doing well due to injury so not able to walk.  Has lost 5 pounds in the last month and understands about PPH.  Would like to try topamax.  Obesity  Symptoms: no chest pain, no cough, no fever, no numbness and no weakness        The following portions of the patient's history were personally reviewed and updated as appropriate: allergies, current medications, past medical history, past surgical history, past family history, and past social history.     Body mass index is 38.63 kg/m².           Past History:    Medical History: has a past medical history of Allergic (Asa, Pcn/ childhood), Anxiety (2015), Arthritis (2020), Benign heart murmur, Cataract (2023), Colon polyp (2021), Diverticulosis (2021), HL (hearing loss) (1983), Hypertension, Hypothyroidism, Keloid, Leukopenia, Obesity (1990), Osteopenia, Sickle cell anemia (1961), and Sickle cell anemia without crisis.     Surgical History: has a past surgical history that includes Breast Reduction (Bilateral, 2013); Hysterectomy (1997); Colonoscopy (01/2021); and Breast surgery (2015).     Family History: family history includes Alcohol abuse in her father; Aneurysm in her sister; Arthritis in her mother; Breast cancer (age of onset: 65) in her mother; COPD in her mother; Cancer in her maternal aunt, maternal aunt, mother, and sister; Cirrhosis in her father; Diabetes in her brother, mother, and sister; Fibromyalgia in her sister; Heart disease  in her brother and mother; Hypertension in her brother, mother, and sister; Sarcoidosis in her sister; Stroke in her sister.     Social History: reports that she quit smoking about 9 years ago. Her smoking use included cigarettes. She started smoking about 42 years ago. She has a 6.6 pack-year smoking history. She has been exposed to tobacco smoke. She has never used smokeless tobacco. She reports current alcohol use. She reports that she does not use drugs.    Allergies: Morphine and Penicillins          Current Outpatient Medications:     amLODIPine (NORVASC) 10 MG tablet, TAKE 1 TABLET BY MOUTH DAILY, Disp: 90 tablet, Rfl: 1    atorvastatin (LIPITOR) 20 MG tablet, Take 1 tablet by mouth Daily., Disp: 90 tablet, Rfl: 3    B Complex Vitamins (vitamin b complex) capsule capsule, Take  by mouth Daily., Disp: , Rfl:     buPROPion XL (WELLBUTRIN XL) 300 MG 24 hr tablet, Take 1 tablet by mouth Daily., Disp: 90 tablet, Rfl: 1    CALCIUM PO, Take 600 mg by mouth Daily., Disp: , Rfl:     cloNIDine (CATAPRES) 0.3 MG tablet, TAKE 1 TABLET BY MOUTH DAILY, Disp: 90 tablet, Rfl: 1    fosinopril (MONOPRIL) 40 MG tablet, Take 1 tablet by mouth Daily., Disp: 90 tablet, Rfl: 1    hydroCHLOROthiazide 50 MG tablet, Take 1 tablet by mouth Daily., Disp: 90 tablet, Rfl: 1    levothyroxine (Synthroid) 150 MCG tablet, Take 1 tablet by mouth Every Other Day., Disp: 90 tablet, Rfl: 1    Potassium 99 MG tablet, Take 1 tablet by mouth Daily., Disp: 90 each, Rfl: 3    Phentermine-Topiramate ER 3.75-23 MG capsule sustained-release 24 hr, Take 3.75 mg by mouth Daily., Disp: 30 capsule, Rfl: 0    Medications Discontinued During This Encounter   Medication Reason    phentermine 37.5 MG capsule *Therapy completed         Review of Systems   Constitutional:  Negative for fever.   Respiratory:  Negative for cough and shortness of breath.    Cardiovascular:  Negative for chest pain, palpitations and leg swelling.   Neurological:  Negative for  "dizziness, weakness, numbness and headache.        Objective         Vitals:    07/24/25 0726   BP: 118/74   BP Location: Right arm   Patient Position: Sitting   Cuff Size: Large Adult   Pulse: 95   Resp: 18   Temp: 97.4 °F (36.3 °C)   TempSrc: Temporal   SpO2: 98%   Weight: 109 kg (239 lb 3.2 oz)   Height: 167.6 cm (65.98\")     Body mass index is 38.63 kg/m².         Physical Exam  Vitals reviewed.   Constitutional:       Appearance: Normal appearance. She is well-developed.   HENT:      Head: Normocephalic and atraumatic.      Mouth/Throat:      Pharynx: No oropharyngeal exudate.   Eyes:      Conjunctiva/sclera: Conjunctivae normal.      Pupils: Pupils are equal, round, and reactive to light.   Cardiovascular:      Rate and Rhythm: Normal rate and regular rhythm.      Heart sounds: Normal heart sounds. No murmur heard.     No friction rub. No gallop.   Pulmonary:      Effort: Pulmonary effort is normal.      Breath sounds: Normal breath sounds. No wheezing or rhonchi.   Musculoskeletal:         General: Normal range of motion.      Cervical back: Normal range of motion.        Legs:       Comments: Tenderness and swelling to ankle and lower fibula.   Skin:     General: Skin is warm and dry.   Neurological:      Mental Status: She is alert and oriented to person, place, and time.   Psychiatric:         Mood and Affect: Mood and affect normal.         Behavior: Behavior normal.         Thought Content: Thought content normal.         Judgment: Judgment normal.             Result Review :               Assessment and Plan     Diagnoses and all orders for this visit:    1. Annual physical exam (Primary)  Comments:  discussed diet and exercise.    2. Strain of left ankle, subsequent encounter  -     Ambulatory Referral to Physical Therapy for Evaluation & Treatment  -     MRI Ankle Left Without Contrast; Future    3. Acute left ankle pain  -     MRI Ankle Left Without Contrast; Future  -     XR Ankle 3+ View Left; " Future    4. Class 2 obesity due to excess calories without serious comorbidity with body mass index (BMI) of 38.0 to 38.9 in adult  -     Phentermine-Topiramate ER 3.75-23 MG capsule sustained-release 24 hr; Take 3.75 mg by mouth Daily.  Dispense: 30 capsule; Refill: 0    5. Medication monitoring encounter  -     POC Medline 12 Panel Urine Drug Screen              Follow Up     Return in about 1 month (around 8/24/2025).    Patient was given instructions and counseling regarding her condition or for health maintenance advice. Please see specific information pulled into the AVS if appropriate.

## 2025-07-28 ENCOUNTER — RESULTS FOLLOW-UP (OUTPATIENT)
Dept: FAMILY MEDICINE CLINIC | Facility: CLINIC | Age: 65
End: 2025-07-28
Payer: COMMERCIAL

## 2025-08-04 ENCOUNTER — TELEPHONE (OUTPATIENT)
Dept: FAMILY MEDICINE CLINIC | Facility: CLINIC | Age: 65
End: 2025-08-04
Payer: COMMERCIAL

## 2025-08-25 ENCOUNTER — OFFICE VISIT (OUTPATIENT)
Dept: FAMILY MEDICINE CLINIC | Facility: CLINIC | Age: 65
End: 2025-08-25
Payer: COMMERCIAL

## 2025-08-25 ENCOUNTER — TELEPHONE (OUTPATIENT)
Dept: FAMILY MEDICINE CLINIC | Facility: CLINIC | Age: 65
End: 2025-08-25

## 2025-08-25 VITALS
BODY MASS INDEX: 38.47 KG/M2 | SYSTOLIC BLOOD PRESSURE: 120 MMHG | DIASTOLIC BLOOD PRESSURE: 74 MMHG | HEART RATE: 84 BPM | OXYGEN SATURATION: 98 % | HEIGHT: 66 IN | RESPIRATION RATE: 18 BRPM | TEMPERATURE: 97.7 F | WEIGHT: 239.4 LBS

## 2025-08-25 DIAGNOSIS — E66.09 CLASS 2 OBESITY DUE TO EXCESS CALORIES WITHOUT SERIOUS COMORBIDITY WITH BODY MASS INDEX (BMI) OF 38.0 TO 38.9 IN ADULT: Primary | ICD-10-CM

## 2025-08-25 DIAGNOSIS — E66.812 CLASS 2 OBESITY DUE TO EXCESS CALORIES WITHOUT SERIOUS COMORBIDITY WITH BODY MASS INDEX (BMI) OF 38.0 TO 38.9 IN ADULT: Primary | ICD-10-CM

## 2025-08-25 PROCEDURE — 99213 OFFICE O/P EST LOW 20 MIN: CPT | Performed by: NURSE PRACTITIONER

## 2025-08-25 RX ORDER — PHENTERMINE AND TOPIRAMATE EXTENDED-RELEASE 7.5; 46 MG/1; MG/1
1 CAPSULE ORAL DAILY
Qty: 30 CAPSULE | Refills: 0 | Status: SHIPPED | OUTPATIENT
Start: 2025-08-25